# Patient Record
Sex: MALE | Race: WHITE | NOT HISPANIC OR LATINO | Employment: FULL TIME | URBAN - METROPOLITAN AREA
[De-identification: names, ages, dates, MRNs, and addresses within clinical notes are randomized per-mention and may not be internally consistent; named-entity substitution may affect disease eponyms.]

---

## 2017-01-09 ENCOUNTER — ALLSCRIPTS OFFICE VISIT (OUTPATIENT)
Dept: OTHER | Facility: OTHER | Age: 42
End: 2017-01-09

## 2017-01-19 ENCOUNTER — ALLSCRIPTS OFFICE VISIT (OUTPATIENT)
Dept: OTHER | Facility: OTHER | Age: 42
End: 2017-01-19

## 2017-01-19 ENCOUNTER — GENERIC CONVERSION - ENCOUNTER (OUTPATIENT)
Dept: OTHER | Facility: OTHER | Age: 42
End: 2017-01-19

## 2017-01-19 LAB
FLUAV AG SPEC QL IA: POSITIVE
INFLUENZA B AG (HISTORICAL): NEGATIVE

## 2017-03-27 DIAGNOSIS — E11.65 TYPE 2 DIABETES MELLITUS WITH HYPERGLYCEMIA (HCC): ICD-10-CM

## 2017-03-27 DIAGNOSIS — E78.1 PURE HYPERGLYCERIDEMIA: ICD-10-CM

## 2017-03-27 DIAGNOSIS — I10 ESSENTIAL (PRIMARY) HYPERTENSION: ICD-10-CM

## 2017-04-27 ENCOUNTER — APPOINTMENT (EMERGENCY)
Dept: ULTRASOUND IMAGING | Facility: HOSPITAL | Age: 42
End: 2017-04-27
Payer: COMMERCIAL

## 2017-04-27 ENCOUNTER — HOSPITAL ENCOUNTER (EMERGENCY)
Facility: HOSPITAL | Age: 42
Discharge: HOME | End: 2017-04-28
Attending: EMERGENCY MEDICINE | Admitting: EMERGENCY MEDICINE
Payer: COMMERCIAL

## 2017-04-27 ENCOUNTER — APPOINTMENT (EMERGENCY)
Dept: CT IMAGING | Facility: HOSPITAL | Age: 42
End: 2017-04-27
Payer: COMMERCIAL

## 2017-04-27 VITALS — OXYGEN SATURATION: 99 % | TEMPERATURE: 97.9 F | HEART RATE: 75 BPM | RESPIRATION RATE: 18 BRPM

## 2017-04-27 DIAGNOSIS — N20.1 RIGHT URETERAL STONE: Primary | ICD-10-CM

## 2017-04-27 LAB
ANION GAP SERPL CALCULATED.3IONS-SCNC: 13 MMOL/L (ref 4–13)
BASOPHILS # BLD AUTO: 0.04 THOUSANDS/ΜL (ref 0–0.1)
BASOPHILS NFR BLD AUTO: 1 % (ref 0–1)
BUN SERPL-MCNC: 21 MG/DL (ref 5–25)
CALCIUM SERPL-MCNC: 9.1 MG/DL (ref 8.3–10.1)
CHLORIDE SERPL-SCNC: 96 MMOL/L (ref 100–108)
CLARITY, POC: CLEAR
CO2 SERPL-SCNC: 26 MMOL/L (ref 21–32)
COLOR, POC: NORMAL
COLOR, POC: YELLOW
CREAT SERPL-MCNC: 1.1 MG/DL (ref 0.6–1.3)
EOSINOPHIL # BLD AUTO: 0.1 THOUSAND/ΜL (ref 0–0.61)
EOSINOPHIL NFR BLD AUTO: 1 % (ref 0–6)
ERYTHROCYTE [DISTWIDTH] IN BLOOD BY AUTOMATED COUNT: 14.4 % (ref 11.6–15.1)
EXT BILIRUBIN, UA: NORMAL
EXT BLOOD URINE: NORMAL
EXT GLUCOSE, UA: NORMAL
EXT KETONES: NORMAL
EXT NITRITE, UA: NORMAL
EXT PH, UA: 5
EXT PROTEIN, UA: NORMAL
EXT SPECIFIC GRAVITY, UA: 1.01
EXT UROBILINOGEN: 0.2
GFR SERPL CREATININE-BSD FRML MDRD: >60 ML/MIN/1.73SQ M
GLUCOSE SERPL-MCNC: 276 MG/DL (ref 65–140)
HCT VFR BLD AUTO: 40.6 % (ref 36.5–49.3)
HGB BLD-MCNC: 13.8 G/DL (ref 12–17)
LYMPHOCYTES # BLD AUTO: 1.58 THOUSANDS/ΜL (ref 0.6–4.47)
LYMPHOCYTES NFR BLD AUTO: 22 % (ref 14–44)
MCH RBC QN AUTO: 26.1 PG (ref 26.8–34.3)
MCHC RBC AUTO-ENTMCNC: 34 G/DL (ref 31.4–37.4)
MCV RBC AUTO: 77 FL (ref 82–98)
MONOCYTES # BLD AUTO: 0.55 THOUSAND/ΜL (ref 0.17–1.22)
MONOCYTES NFR BLD AUTO: 8 % (ref 4–12)
NEUTROPHILS # BLD AUTO: 4.79 THOUSANDS/ΜL (ref 1.85–7.62)
NEUTS SEG NFR BLD AUTO: 68 % (ref 43–75)
NRBC BLD AUTO-RTO: 0 /100 WBCS
PLATELET # BLD AUTO: 181 THOUSANDS/UL (ref 149–390)
PMV BLD AUTO: 10.3 FL (ref 8.9–12.7)
POTASSIUM SERPL-SCNC: 4 MMOL/L (ref 3.5–5.3)
RBC # BLD AUTO: 5.29 MILLION/UL (ref 3.88–5.62)
SODIUM SERPL-SCNC: 135 MMOL/L (ref 136–145)
WBC # BLD AUTO: 7.08 THOUSAND/UL (ref 4.31–10.16)
WBC # BLD EST: NORMAL 10*3/UL

## 2017-04-27 PROCEDURE — 36415 COLL VENOUS BLD VENIPUNCTURE: CPT | Performed by: PHYSICIAN ASSISTANT

## 2017-04-27 PROCEDURE — 85025 COMPLETE CBC W/AUTO DIFF WBC: CPT | Performed by: PHYSICIAN ASSISTANT

## 2017-04-27 PROCEDURE — 80048 BASIC METABOLIC PNL TOTAL CA: CPT | Performed by: PHYSICIAN ASSISTANT

## 2017-04-27 PROCEDURE — 81002 URINALYSIS NONAUTO W/O SCOPE: CPT | Performed by: PHYSICIAN ASSISTANT

## 2017-04-27 PROCEDURE — 74176 CT ABD & PELVIS W/O CONTRAST: CPT

## 2017-04-27 PROCEDURE — 76870 US EXAM SCROTUM: CPT

## 2017-04-27 PROCEDURE — 96374 THER/PROPH/DIAG INJ IV PUSH: CPT

## 2017-04-27 PROCEDURE — 96375 TX/PRO/DX INJ NEW DRUG ADDON: CPT

## 2017-04-27 RX ORDER — ONDANSETRON 2 MG/ML
4 INJECTION INTRAMUSCULAR; INTRAVENOUS ONCE
Status: DISCONTINUED | OUTPATIENT
Start: 2017-04-27 | End: 2017-04-28 | Stop reason: HOSPADM

## 2017-04-27 RX ORDER — KETOROLAC TROMETHAMINE 10 MG/1
10 TABLET, FILM COATED ORAL EVERY 6 HOURS PRN
Qty: 16 TABLET | Refills: 0 | Status: ON HOLD | OUTPATIENT
Start: 2017-04-27 | End: 2017-06-19 | Stop reason: ALTCHOICE

## 2017-04-27 RX ORDER — MORPHINE SULFATE 4 MG/ML
4 INJECTION, SOLUTION INTRAMUSCULAR; INTRAVENOUS ONCE
Status: DISCONTINUED | OUTPATIENT
Start: 2017-04-27 | End: 2017-04-28 | Stop reason: HOSPADM

## 2017-04-27 RX ORDER — ICOSAPENT ETHYL 1000 MG/1
1 CAPSULE ORAL 2 TIMES DAILY
COMMUNITY
End: 2017-06-14 | Stop reason: ALTCHOICE

## 2017-04-27 RX ORDER — ONDANSETRON 4 MG/1
4 TABLET, ORALLY DISINTEGRATING ORAL EVERY 6 HOURS PRN
Qty: 15 TABLET | Refills: 0 | Status: ON HOLD | OUTPATIENT
Start: 2017-04-27 | End: 2017-06-19 | Stop reason: ALTCHOICE

## 2017-04-27 RX ORDER — ATORVASTATIN CALCIUM 40 MG/1
40 TABLET, FILM COATED ORAL DAILY
COMMUNITY
End: 2018-06-20 | Stop reason: SDUPTHER

## 2017-04-27 RX ORDER — VALSARTAN AND HYDROCHLOROTHIAZIDE 320; 25 MG/1; MG/1
1 TABLET, FILM COATED ORAL DAILY
COMMUNITY
End: 2018-05-29

## 2017-04-27 RX ORDER — METFORMIN HYDROCHLORIDE 1000 MG/1
1000 TABLET, FILM COATED, EXTENDED RELEASE ORAL 2 TIMES DAILY WITH MEALS
COMMUNITY
End: 2018-05-29

## 2017-04-27 RX ORDER — ALLOPURINOL 300 MG/1
300 TABLET ORAL DAILY
COMMUNITY
End: 2018-10-02 | Stop reason: SDUPTHER

## 2017-04-27 RX ORDER — OXYCODONE HYDROCHLORIDE 5 MG/1
5 TABLET ORAL EVERY 4 HOURS PRN
Qty: 25 TABLET | Refills: 0 | Status: SHIPPED | OUTPATIENT
Start: 2017-04-27 | End: 2017-05-07

## 2017-04-27 RX ORDER — MORPHINE SULFATE 4 MG/ML
4 INJECTION, SOLUTION INTRAMUSCULAR; INTRAVENOUS ONCE
Status: COMPLETED | OUTPATIENT
Start: 2017-04-27 | End: 2017-04-27

## 2017-04-27 RX ORDER — KETOROLAC TROMETHAMINE 30 MG/ML
30 INJECTION, SOLUTION INTRAMUSCULAR; INTRAVENOUS ONCE
Status: COMPLETED | OUTPATIENT
Start: 2017-04-27 | End: 2017-04-27

## 2017-04-27 RX ORDER — ONDANSETRON 2 MG/ML
4 INJECTION INTRAMUSCULAR; INTRAVENOUS ONCE
Status: COMPLETED | OUTPATIENT
Start: 2017-04-27 | End: 2017-04-27

## 2017-04-27 RX ORDER — TAMSULOSIN HYDROCHLORIDE 0.4 MG/1
0.4 CAPSULE ORAL
Qty: 10 CAPSULE | Refills: 0 | Status: ON HOLD | OUTPATIENT
Start: 2017-04-27 | End: 2017-06-19 | Stop reason: ALTCHOICE

## 2017-04-27 RX ORDER — ONDANSETRON HYDROCHLORIDE 4 MG/5ML
4 SOLUTION ORAL ONCE
Status: DISCONTINUED | OUTPATIENT
Start: 2017-04-27 | End: 2017-04-27

## 2017-04-27 RX ADMIN — ONDANSETRON 4 MG: 2 INJECTION INTRAMUSCULAR; INTRAVENOUS at 21:25

## 2017-04-27 RX ADMIN — KETOROLAC TROMETHAMINE 30 MG: 30 INJECTION, SOLUTION INTRAMUSCULAR at 21:25

## 2017-04-27 RX ADMIN — MORPHINE SULFATE 4 MG: 4 INJECTION, SOLUTION INTRAMUSCULAR; INTRAVENOUS at 21:25

## 2017-04-28 PROCEDURE — 99284 EMERGENCY DEPT VISIT MOD MDM: CPT

## 2017-04-30 ENCOUNTER — ANESTHESIA EVENT (OUTPATIENT)
Dept: PERIOP | Facility: HOSPITAL | Age: 42
End: 2017-04-30
Payer: COMMERCIAL

## 2017-05-01 ENCOUNTER — HOSPITAL ENCOUNTER (OUTPATIENT)
Facility: HOSPITAL | Age: 42
Setting detail: OUTPATIENT SURGERY
Discharge: HOME | End: 2017-05-01
Attending: UROLOGY | Admitting: UROLOGY
Payer: COMMERCIAL

## 2017-05-01 ENCOUNTER — ANESTHESIA (OUTPATIENT)
Dept: PERIOP | Facility: HOSPITAL | Age: 42
End: 2017-05-01
Payer: COMMERCIAL

## 2017-05-01 ENCOUNTER — APPOINTMENT (OUTPATIENT)
Dept: RADIOLOGY | Facility: HOSPITAL | Age: 42
End: 2017-05-01
Payer: COMMERCIAL

## 2017-05-01 VITALS
HEART RATE: 80 BPM | OXYGEN SATURATION: 95 % | TEMPERATURE: 99.6 F | RESPIRATION RATE: 18 BRPM | HEIGHT: 74 IN | WEIGHT: 315 LBS | BODY MASS INDEX: 40.43 KG/M2 | SYSTOLIC BLOOD PRESSURE: 142 MMHG | DIASTOLIC BLOOD PRESSURE: 72 MMHG

## 2017-05-01 DIAGNOSIS — N20.9 CALCULUS (=STONE): ICD-10-CM

## 2017-05-01 LAB
ANION GAP SERPL CALCULATED.3IONS-SCNC: 7 MMOL/L (ref 4–13)
BUN SERPL-MCNC: 24 MG/DL (ref 5–25)
CALCIUM SERPL-MCNC: 9.2 MG/DL (ref 8.3–10.1)
CHLORIDE SERPL-SCNC: 100 MMOL/L (ref 100–108)
CO2 SERPL-SCNC: 27 MMOL/L (ref 21–32)
CREAT SERPL-MCNC: 1.58 MG/DL (ref 0.6–1.3)
GFR SERPL CREATININE-BSD FRML MDRD: 47.9 ML/MIN/1.73SQ M
GLUCOSE P FAST SERPL-MCNC: 225 MG/DL (ref 65–99)
GLUCOSE SERPL-MCNC: 225 MG/DL (ref 65–140)
GLUCOSE SERPL-MCNC: 234 MG/DL (ref 65–140)
GLUCOSE SERPL-MCNC: 249 MG/DL (ref 65–140)
POTASSIUM SERPL-SCNC: 4.4 MMOL/L (ref 3.5–5.3)
SODIUM SERPL-SCNC: 134 MMOL/L (ref 136–145)

## 2017-05-01 PROCEDURE — A9270 NON-COVERED ITEM OR SERVICE: HCPCS | Performed by: UROLOGY

## 2017-05-01 PROCEDURE — 80048 BASIC METABOLIC PNL TOTAL CA: CPT | Performed by: UROLOGY

## 2017-05-01 PROCEDURE — C1769 GUIDE WIRE: HCPCS | Performed by: UROLOGY

## 2017-05-01 PROCEDURE — 87086 URINE CULTURE/COLONY COUNT: CPT | Performed by: UROLOGY

## 2017-05-01 PROCEDURE — 82948 REAGENT STRIP/BLOOD GLUCOSE: CPT

## 2017-05-01 PROCEDURE — 82360 CALCULUS ASSAY QUANT: CPT | Performed by: UROLOGY

## 2017-05-01 PROCEDURE — 74420 UROGRAPHY RTRGR +-KUB: CPT

## 2017-05-01 PROCEDURE — C2617 STENT, NON-COR, TEM W/O DEL: HCPCS | Performed by: UROLOGY

## 2017-05-01 DEVICE — STENT URETERAL 6 FR 26CM INLAY OPTIMA: Type: IMPLANTABLE DEVICE | Site: URETER | Status: FUNCTIONAL

## 2017-05-01 RX ORDER — OXYCODONE HYDROCHLORIDE AND ACETAMINOPHEN 5; 325 MG/1; MG/1
1 TABLET ORAL EVERY 4 HOURS PRN
Qty: 20 TABLET | Refills: 0 | Status: SHIPPED | OUTPATIENT
Start: 2017-05-01 | End: 2017-05-08

## 2017-05-01 RX ORDER — ONDANSETRON 2 MG/ML
4 INJECTION INTRAMUSCULAR; INTRAVENOUS
Status: DISCONTINUED | OUTPATIENT
Start: 2017-05-01 | End: 2017-05-01 | Stop reason: HOSPADM

## 2017-05-01 RX ORDER — OXYCODONE HYDROCHLORIDE AND ACETAMINOPHEN 5; 325 MG/1; MG/1
1 TABLET ORAL EVERY 4 HOURS PRN
Status: DISCONTINUED | OUTPATIENT
Start: 2017-05-01 | End: 2017-05-01 | Stop reason: HOSPADM

## 2017-05-01 RX ORDER — DIPHENHYDRAMINE HYDROCHLORIDE 50 MG/ML
12.5 INJECTION INTRAMUSCULAR; INTRAVENOUS
Status: DISCONTINUED | OUTPATIENT
Start: 2017-05-01 | End: 2017-05-01 | Stop reason: HOSPADM

## 2017-05-01 RX ORDER — SODIUM CHLORIDE, SODIUM LACTATE, POTASSIUM CHLORIDE, CALCIUM CHLORIDE 600; 310; 30; 20 MG/100ML; MG/100ML; MG/100ML; MG/100ML
125 INJECTION, SOLUTION INTRAVENOUS CONTINUOUS
Status: DISCONTINUED | OUTPATIENT
Start: 2017-05-01 | End: 2017-05-01 | Stop reason: HOSPADM

## 2017-05-01 RX ORDER — PHENYLEPHRINE HCL 10 MG
1 TABLET ORAL 2 TIMES DAILY
COMMUNITY
End: 2019-08-12 | Stop reason: ALTCHOICE

## 2017-05-01 RX ORDER — PROPOFOL 10 MG/ML
INJECTION, EMULSION INTRAVENOUS AS NEEDED
Status: DISCONTINUED | OUTPATIENT
Start: 2017-05-01 | End: 2017-05-01 | Stop reason: SURG

## 2017-05-01 RX ORDER — INDOMETHACIN 75 MG/1
75 CAPSULE, EXTENDED RELEASE ORAL AS NEEDED
COMMUNITY
End: 2018-05-29

## 2017-05-01 RX ORDER — SODIUM CHLORIDE, SODIUM LACTATE, POTASSIUM CHLORIDE, CALCIUM CHLORIDE 600; 310; 30; 20 MG/100ML; MG/100ML; MG/100ML; MG/100ML
200 INJECTION, SOLUTION INTRAVENOUS CONTINUOUS
Status: DISCONTINUED | OUTPATIENT
Start: 2017-05-01 | End: 2017-05-01 | Stop reason: HOSPADM

## 2017-05-01 RX ORDER — FENTANYL CITRATE/PF 50 MCG/ML
25 SYRINGE (ML) INJECTION
Status: DISCONTINUED | OUTPATIENT
Start: 2017-05-01 | End: 2017-05-01 | Stop reason: HOSPADM

## 2017-05-01 RX ORDER — ONDANSETRON 2 MG/ML
INJECTION INTRAMUSCULAR; INTRAVENOUS AS NEEDED
Status: DISCONTINUED | OUTPATIENT
Start: 2017-05-01 | End: 2017-05-01 | Stop reason: SURG

## 2017-05-01 RX ORDER — FENTANYL CITRATE 50 UG/ML
INJECTION, SOLUTION INTRAMUSCULAR; INTRAVENOUS AS NEEDED
Status: DISCONTINUED | OUTPATIENT
Start: 2017-05-01 | End: 2017-05-01 | Stop reason: SURG

## 2017-05-01 RX ORDER — LIDOCAINE HYDROCHLORIDE 10 MG/ML
INJECTION, SOLUTION INFILTRATION; PERINEURAL AS NEEDED
Status: DISCONTINUED | OUTPATIENT
Start: 2017-05-01 | End: 2017-05-01 | Stop reason: SURG

## 2017-05-01 RX ORDER — CEFUROXIME AXETIL 500 MG/1
500 TABLET ORAL EVERY 12 HOURS SCHEDULED
Qty: 6 TABLET | Refills: 0 | Status: SHIPPED | OUTPATIENT
Start: 2017-05-01 | End: 2017-05-04

## 2017-05-01 RX ADMIN — ONDANSETRON 4 MG: 2 INJECTION INTRAMUSCULAR; INTRAVENOUS at 15:25

## 2017-05-01 RX ADMIN — Medication 2000 MG: at 14:57

## 2017-05-01 RX ADMIN — PROPOFOL 100 MG: 10 INJECTION, EMULSION INTRAVENOUS at 15:36

## 2017-05-01 RX ADMIN — PROPOFOL 50 MG: 10 INJECTION, EMULSION INTRAVENOUS at 15:38

## 2017-05-01 RX ADMIN — SODIUM CHLORIDE, SODIUM LACTATE, POTASSIUM CHLORIDE, AND CALCIUM CHLORIDE 125 ML/HR: .6; .31; .03; .02 INJECTION, SOLUTION INTRAVENOUS at 14:11

## 2017-05-01 RX ADMIN — FENTANYL CITRATE 50 MCG: 50 INJECTION, SOLUTION INTRAMUSCULAR; INTRAVENOUS at 15:50

## 2017-05-01 RX ADMIN — CEFAZOLIN SODIUM 1000 MG: 1 SOLUTION INTRAVENOUS at 14:57

## 2017-05-01 RX ADMIN — OXYCODONE HYDROCHLORIDE AND ACETAMINOPHEN 1 TABLET: 5; 325 TABLET ORAL at 17:32

## 2017-05-01 RX ADMIN — FENTANYL CITRATE 50 MCG: 50 INJECTION, SOLUTION INTRAMUSCULAR; INTRAVENOUS at 14:58

## 2017-05-01 RX ADMIN — PROPOFOL 200 MG: 10 INJECTION, EMULSION INTRAVENOUS at 14:58

## 2017-05-01 RX ADMIN — LIDOCAINE HYDROCHLORIDE 50 MG: 10 INJECTION, SOLUTION INFILTRATION; PERINEURAL at 14:58

## 2017-05-01 RX ADMIN — FENTANYL CITRATE 50 MCG: 50 INJECTION, SOLUTION INTRAMUSCULAR; INTRAVENOUS at 15:20

## 2017-05-04 LAB — BACTERIA UR CULT: NORMAL

## 2017-05-09 LAB
CA PHOS MFR STONE: 5 %
COLOR STONE: NORMAL
COM MFR STONE: 95 %
COMMENT-STONE3: NORMAL
COMPOSITION: NORMAL
LABORATORY COMMENT REPORT: NORMAL
NIDUS STONE QL: NORMAL
PHOTO: NORMAL
SIZE STONE: NORMAL MM
STONE ANALYSIS-IMP: NORMAL
WT STONE: 41.1 MG

## 2017-06-06 ENCOUNTER — GENERIC CONVERSION - ENCOUNTER (OUTPATIENT)
Dept: OTHER | Facility: OTHER | Age: 42
End: 2017-06-06

## 2017-06-14 ENCOUNTER — ALLSCRIPTS OFFICE VISIT (OUTPATIENT)
Dept: OTHER | Facility: OTHER | Age: 42
End: 2017-06-14

## 2017-06-14 DIAGNOSIS — E11.65 TYPE 2 DIABETES MELLITUS WITH HYPERGLYCEMIA (HCC): ICD-10-CM

## 2017-06-19 ENCOUNTER — HOSPITAL ENCOUNTER (OUTPATIENT)
Facility: HOSPITAL | Age: 42
Setting detail: OUTPATIENT SURGERY
Discharge: HOME | End: 2017-06-19
Attending: UROLOGY | Admitting: UROLOGY
Payer: COMMERCIAL

## 2017-06-19 ENCOUNTER — ANESTHESIA (OUTPATIENT)
Dept: PERIOP | Facility: HOSPITAL | Age: 42
End: 2017-06-19
Payer: COMMERCIAL

## 2017-06-19 ENCOUNTER — ANESTHESIA EVENT (OUTPATIENT)
Dept: PERIOP | Facility: HOSPITAL | Age: 42
End: 2017-06-19
Payer: COMMERCIAL

## 2017-06-19 VITALS
HEIGHT: 74 IN | SYSTOLIC BLOOD PRESSURE: 133 MMHG | RESPIRATION RATE: 18 BRPM | BODY MASS INDEX: 40.43 KG/M2 | OXYGEN SATURATION: 97 % | WEIGHT: 315 LBS | TEMPERATURE: 98.1 F | DIASTOLIC BLOOD PRESSURE: 84 MMHG | HEART RATE: 70 BPM

## 2017-06-19 PROBLEM — N20.0 RENAL CALCULUS, LEFT: Status: ACTIVE | Noted: 2017-06-19

## 2017-06-19 LAB
GLUCOSE SERPL-MCNC: 180 MG/DL (ref 65–140)
GLUCOSE SERPL-MCNC: 199 MG/DL (ref 65–140)

## 2017-06-19 PROCEDURE — 82948 REAGENT STRIP/BLOOD GLUCOSE: CPT

## 2017-06-19 RX ORDER — PROPOFOL 10 MG/ML
INJECTION, EMULSION INTRAVENOUS AS NEEDED
Status: DISCONTINUED | OUTPATIENT
Start: 2017-06-19 | End: 2017-06-19 | Stop reason: SURG

## 2017-06-19 RX ORDER — ONDANSETRON 2 MG/ML
INJECTION INTRAMUSCULAR; INTRAVENOUS AS NEEDED
Status: DISCONTINUED | OUTPATIENT
Start: 2017-06-19 | End: 2017-06-19 | Stop reason: SURG

## 2017-06-19 RX ORDER — SODIUM CHLORIDE, SODIUM LACTATE, POTASSIUM CHLORIDE, CALCIUM CHLORIDE 600; 310; 30; 20 MG/100ML; MG/100ML; MG/100ML; MG/100ML
250 INJECTION, SOLUTION INTRAVENOUS CONTINUOUS
Status: DISCONTINUED | OUTPATIENT
Start: 2017-06-19 | End: 2017-06-30 | Stop reason: HOSPADM

## 2017-06-19 RX ORDER — OXYCODONE HYDROCHLORIDE AND ACETAMINOPHEN 5; 325 MG/1; MG/1
1 TABLET ORAL EVERY 4 HOURS PRN
Qty: 12 TABLET | Refills: 0 | Status: SHIPPED | OUTPATIENT
Start: 2017-06-19 | End: 2017-06-22

## 2017-06-19 RX ORDER — SODIUM CHLORIDE, SODIUM LACTATE, POTASSIUM CHLORIDE, CALCIUM CHLORIDE 600; 310; 30; 20 MG/100ML; MG/100ML; MG/100ML; MG/100ML
75 INJECTION, SOLUTION INTRAVENOUS CONTINUOUS
Status: CANCELLED | OUTPATIENT
Start: 2017-06-19

## 2017-06-19 RX ORDER — LIDOCAINE HYDROCHLORIDE 10 MG/ML
INJECTION, SOLUTION INFILTRATION; PERINEURAL AS NEEDED
Status: DISCONTINUED | OUTPATIENT
Start: 2017-06-19 | End: 2017-06-19 | Stop reason: SURG

## 2017-06-19 RX ORDER — OXYCODONE HYDROCHLORIDE AND ACETAMINOPHEN 5; 325 MG/1; MG/1
1 TABLET ORAL EVERY 4 HOURS PRN
Status: DISCONTINUED | OUTPATIENT
Start: 2017-06-19 | End: 2017-06-30 | Stop reason: HOSPADM

## 2017-06-19 RX ORDER — FENTANYL CITRATE 50 UG/ML
INJECTION, SOLUTION INTRAMUSCULAR; INTRAVENOUS AS NEEDED
Status: DISCONTINUED | OUTPATIENT
Start: 2017-06-19 | End: 2017-06-19 | Stop reason: SURG

## 2017-06-19 RX ORDER — SODIUM CHLORIDE, SODIUM LACTATE, POTASSIUM CHLORIDE, CALCIUM CHLORIDE 600; 310; 30; 20 MG/100ML; MG/100ML; MG/100ML; MG/100ML
20 INJECTION, SOLUTION INTRAVENOUS CONTINUOUS
Status: DISCONTINUED | OUTPATIENT
Start: 2017-06-19 | End: 2017-06-30 | Stop reason: HOSPADM

## 2017-06-19 RX ORDER — METOCLOPRAMIDE HYDROCHLORIDE 5 MG/ML
10 INJECTION INTRAMUSCULAR; INTRAVENOUS ONCE AS NEEDED
Status: DISCONTINUED | OUTPATIENT
Start: 2017-06-19 | End: 2017-06-19 | Stop reason: HOSPADM

## 2017-06-19 RX ORDER — SUCCINYLCHOLINE CHLORIDE 20 MG/ML
INJECTION INTRAMUSCULAR; INTRAVENOUS AS NEEDED
Status: DISCONTINUED | OUTPATIENT
Start: 2017-06-19 | End: 2017-06-19 | Stop reason: SURG

## 2017-06-19 RX ORDER — FENTANYL CITRATE/PF 50 MCG/ML
50 SYRINGE (ML) INJECTION
Status: DISCONTINUED | OUTPATIENT
Start: 2017-06-19 | End: 2017-06-19 | Stop reason: HOSPADM

## 2017-06-19 RX ORDER — MIDAZOLAM HYDROCHLORIDE 1 MG/ML
INJECTION INTRAMUSCULAR; INTRAVENOUS AS NEEDED
Status: DISCONTINUED | OUTPATIENT
Start: 2017-06-19 | End: 2017-06-19 | Stop reason: SURG

## 2017-06-19 RX ORDER — FUROSEMIDE 10 MG/ML
20 INJECTION INTRAMUSCULAR; INTRAVENOUS ONCE
Status: COMPLETED | OUTPATIENT
Start: 2017-06-19 | End: 2017-06-19

## 2017-06-19 RX ADMIN — PROPOFOL 200 MG: 10 INJECTION, EMULSION INTRAVENOUS at 13:04

## 2017-06-19 RX ADMIN — LIDOCAINE HYDROCHLORIDE 50 MG: 10 INJECTION, SOLUTION INFILTRATION; PERINEURAL at 13:04

## 2017-06-19 RX ADMIN — FENTANYL CITRATE 50 MCG: 50 INJECTION, SOLUTION INTRAMUSCULAR; INTRAVENOUS at 13:19

## 2017-06-19 RX ADMIN — CEFAZOLIN SODIUM 1000 MG: 1 SOLUTION INTRAVENOUS at 13:21

## 2017-06-19 RX ADMIN — FUROSEMIDE 20 MG: 10 INJECTION, SOLUTION INTRAMUSCULAR; INTRAVENOUS at 14:12

## 2017-06-19 RX ADMIN — CEFAZOLIN SODIUM 2000 MG: 2 SOLUTION INTRAVENOUS at 13:21

## 2017-06-19 RX ADMIN — ONDANSETRON 4 MG: 2 INJECTION INTRAMUSCULAR; INTRAVENOUS at 13:34

## 2017-06-19 RX ADMIN — SUCCINYLCHOLINE CHLORIDE 140 MG: 20 INJECTION, SOLUTION INTRAMUSCULAR; INTRAVENOUS at 13:04

## 2017-06-19 RX ADMIN — SODIUM CHLORIDE, SODIUM LACTATE, POTASSIUM CHLORIDE, AND CALCIUM CHLORIDE 20 ML/HR: .6; .31; .03; .02 INJECTION, SOLUTION INTRAVENOUS at 12:21

## 2017-06-19 RX ADMIN — MIDAZOLAM HYDROCHLORIDE 2 MG: 1 INJECTION, SOLUTION INTRAMUSCULAR; INTRAVENOUS at 12:57

## 2017-06-19 RX ADMIN — FENTANYL CITRATE 50 MCG: 50 INJECTION, SOLUTION INTRAMUSCULAR; INTRAVENOUS at 13:04

## 2017-07-07 ENCOUNTER — ALLSCRIPTS OFFICE VISIT (OUTPATIENT)
Dept: OTHER | Facility: OTHER | Age: 42
End: 2017-07-07

## 2017-07-21 ENCOUNTER — ALLSCRIPTS OFFICE VISIT (OUTPATIENT)
Dept: OTHER | Facility: OTHER | Age: 42
End: 2017-07-21

## 2017-12-19 DIAGNOSIS — E11.65 TYPE 2 DIABETES MELLITUS WITH HYPERGLYCEMIA (HCC): ICD-10-CM

## 2017-12-19 DIAGNOSIS — M1A.0790 IDIOPATHIC CHRONIC GOUT, UNSPECIFIED ANKLE AND FOOT, WITHOUT TOPHUS (TOPHI): ICD-10-CM

## 2017-12-19 DIAGNOSIS — I10 ESSENTIAL (PRIMARY) HYPERTENSION: ICD-10-CM

## 2017-12-26 ENCOUNTER — GENERIC CONVERSION - ENCOUNTER (OUTPATIENT)
Dept: OTHER | Facility: OTHER | Age: 42
End: 2017-12-26

## 2017-12-27 LAB
A/G RATIO (HISTORICAL): 1.5 (ref 1.2–2.2)
ALBUMIN SERPL BCP-MCNC: 4.5 G/DL (ref 3.5–5.5)
ALP SERPL-CCNC: 84 IU/L (ref 39–117)
ALT SERPL W P-5'-P-CCNC: 38 IU/L (ref 0–44)
AST SERPL W P-5'-P-CCNC: 23 IU/L (ref 0–40)
BILIRUB SERPL-MCNC: 0.9 MG/DL (ref 0–1.2)
BUN SERPL-MCNC: 17 MG/DL (ref 6–24)
BUN/CREA RATIO (HISTORICAL): 20 (ref 9–20)
CALCIUM SERPL-MCNC: 9.6 MG/DL (ref 8.7–10.2)
CHLORIDE SERPL-SCNC: 90 MMOL/L (ref 96–106)
CHOLEST SERPL-MCNC: 295 MG/DL (ref 100–199)
CO2 SERPL-SCNC: 24 MMOL/L (ref 18–29)
CREAT SERPL-MCNC: 0.83 MG/DL (ref 0.76–1.27)
CREATININE, URINE (HISTORICAL): 132.8 MG/DL
EGFR AFRICAN AMERICAN (HISTORICAL): 124 ML/MIN/1.73
EGFR-AMERICAN CALC (HISTORICAL): 107 ML/MIN/1.73
GLUCOSE SERPL-MCNC: 316 MG/DL (ref 65–99)
HBA1C MFR BLD HPLC: 10.5 % (ref 4.8–5.6)
HDLC SERPL-MCNC: 19 MG/DL
LDLC SERPL CALC-MCNC: ABNORMAL MG/DL (ref 0–99)
MICROALBUM.,U,RANDOM (HISTORICAL): 97.4 UG/ML
MICROALBUMIN/CREATININE RATIO (HISTORICAL): 73.3 MG/G CREAT (ref 0–30)
POTASSIUM SERPL-SCNC: 4.5 MMOL/L (ref 3.5–5.2)
SODIUM SERPL-SCNC: 131 MMOL/L (ref 134–144)
TOT. GLOBULIN, SERUM (HISTORICAL): 3.1 G/DL (ref 1.5–4.5)
TOTAL PROTEIN (HISTORICAL): 7.6 G/DL (ref 6–8.5)
TRIGL SERPL-MCNC: 1926 MG/DL (ref 0–149)
URIC ACID (HISTORICAL): 6.3 MG/DL (ref 3.7–8.6)
VLDLC SERPL CALC-MCNC: ABNORMAL MG/DL (ref 5–40)

## 2018-01-04 ENCOUNTER — GENERIC CONVERSION - ENCOUNTER (OUTPATIENT)
Dept: OTHER | Facility: OTHER | Age: 43
End: 2018-01-04

## 2018-01-10 NOTE — MISCELLANEOUS
Provider Comments  Provider Comments:   PT NO SHOW [id number]      Signatures   Electronically signed by : Bright Aiken MD; Jan 9 2017  6:10PM EST                       (Author)

## 2018-01-11 NOTE — RESULT NOTES
Verified Results  (1) MICROALBUMIN CREATININE RATIO, RANDOM URINE [id number] 09:36AM [facility]     Test Name Result Flag Reference   Creatinine, Urine 111 3 mg/dL  Not Estab  Microalbumin, Urine 68 7 ug/mL  Not Estab     Microalb/Creat Ratio 61 7 mg/g creat H 0 0-30 0     (1) HEMOGLOBIN A1C [id number] 09:36AM [facility]     Test Name Result Flag Reference   Hemoglobin A1c 8 6 % H 4 8-5 6   Pre-diabetes: 5 7 - 6 4           Diabetes: >6 4           Glycemic control for adults with diabetes: <7 0     (1) CBC/PLT/DIFF [id number] 09:36AM [facility]     Test Name Result Flag Reference   WBC 4 6 x10E3/uL  3 4-10 8   RBC 5 38 x10E6/uL  4 14-5 80   Hemoglobin 14 1 g/dL  12 6-17 7   Hematocrit 42 7 %  37 5-51 0   MCV 79 fL  79-97   MCH 26 2 pg L 26 6-33 0   MCHC 33 0 g/dL  31 5-35 7   RDW 15 1 %  12 3-15 4   Platelets 721 I54O2/UI  150-379   Neutrophils 50 %     Lymphs 36 %     Monocytes 10 %     Eos 3 %     Basos 1 %     Neutrophils (Absolute) 2 3 x10E3/uL  1 4-7 0   Lymphs (Absolute) 1 7 x10E3/uL  0 7-3 1   Monocytes(Absolute) 0 5 x10E3/uL  0 1-0 9   Eos (Absolute) 0 2 x10E3/uL  0 0-0 4   Baso (Absolute) 0 0 x10E3/uL  0 0-0 2   Immature Granulocytes 0 %     Immature Grans (Abs) 0 0 x10E3/uL  0 0-0 1     (1) COMPREHENSIVE METABOLIC PANEL [id number] 72:91BA [facility]     Test Name Result Flag Reference   Glucose, Serum 195 mg/dL H 65-99   BUN 18 mg/dL  6-24   Creatinine, Serum 0 85 mg/dL  0 76-1 27   eGFR If NonAfricn Am 107 mL/min/1 73  >59   eGFR If Africn Am 123 mL/min/1 73  >59   BUN/Creatinine Ratio 21 H 9-20   Sodium, Serum 138 mmol/L  134-144   **Please note reference interval change**   Potassium, Serum 4 4 mmol/L  3 5-5 2   Chloride, Serum 95 mmol/L L    **Please note reference interval change**   Carbon Dioxide, Total 25 mmol/L  18-29   Calcium, Serum 9 8 mg/dL  8 7-10 2   Protein, Total, Serum 7 3 g/dL  6 0-8 5   Albumin, Serum 4 5 g/dL  3 5-5 5   Globulin, Total 2 8 g/dL  1 5-4 5   A/G Ratio 1 6  1 1-2 5   Bilirubin, Total 0 4 mg/dL  0 0-1 2   Alkaline Phosphatase, S 76 IU/L     AST (SGOT) 32 IU/L  0-40   ALT (SGPT) 52 IU/L H 0-44     (LC) Lipid Panel [id number] 09:36AM [facility]     Test Name Result Flag Reference   Cholesterol, Total 177 mg/dL  100-199   Triglycerides 625 mg/dL HH 0-149   HDL Cholesterol 23 mg/dL L >39   VLDL Cholesterol Lisandro Comment mg/dL  5-40   The calculation for the VLDL cholesterol is not valid when  triglyceride level is >400 mg/dL  LDL Cholesterol Calc Comment mg/dL  0-99   Triglyceride result indicated is too high for an accurate LDL  cholesterol estimation  (1) LDL,DIRECT [id number] 09:36AM [facility]     Test Name Result Flag Reference   LDL Chol  (Direct) 71 mg/dL  0-99     (LC) TSH Rfx on Abnormal to Free T4 [id number] 09:36AM [facility]     Test Name Result Flag Reference   TSH 0 704 uIU/mL  0 450-4 500     (1) URIC ACID [id number] 09:36AM [facility]     Test Name Result Flag Reference   Uric Acid, Serum 5 9 mg/dL  3 7-8 6   Therapeutic target for gout patients: <6 0     Stewart Douglas HWT89 Default [id number] 09:36AM [facility]     Test Name Result Flag Reference   Liza Shirley CMP14 Default Comment     A hand-written panel/profile was received from your office  In  accordance with the [facility] Ambiguous Test Code Policy dated July 6009, we have completed your order by using the closest currently  or formerly recognized AMA panel  We have assigned Comprehensive  Metabolic Panel (14), Test Code #035539 to this request   If this  is not the testing you wished to receive on this specimen, please  contact the [address] [place] [address] Client Inquiry/Technical Services Department  to clarify the test order  We appreciate your business  [facility]) Liza Shirley LP Default [id number] 09:36AM [facility]     Test Name Result Flag Reference   Andrea Champion LP Default Comment     A hand-written panel/profile was received from your office   In  accordance with the [facility] Ambiguous Test Code Policy dated July 5634, we have completed your order by using the closest currently  or formerly recognized AMA panel  We have assigned Lipid Panel,  Test Code #747506 to this request  If this is not the testing you  wished to receive on this specimen, please contact the [address] [place] [address]  Client Inquiry/Technical Services Department to clarify the test  order  We appreciate your business         Discussion/Summary   schedule for lab review

## 2018-01-13 VITALS
SYSTOLIC BLOOD PRESSURE: 140 MMHG | HEART RATE: 78 BPM | BODY MASS INDEX: 40.43 KG/M2 | DIASTOLIC BLOOD PRESSURE: 84 MMHG | OXYGEN SATURATION: 98 % | HEIGHT: 74 IN | WEIGHT: 315 LBS | TEMPERATURE: 98.6 F

## 2018-01-14 VITALS
TEMPERATURE: 96 F | HEART RATE: 72 BPM | OXYGEN SATURATION: 97 % | SYSTOLIC BLOOD PRESSURE: 126 MMHG | BODY MASS INDEX: 40.43 KG/M2 | HEIGHT: 74 IN | WEIGHT: 315 LBS | DIASTOLIC BLOOD PRESSURE: 82 MMHG

## 2018-01-14 VITALS
WEIGHT: 315 LBS | HEART RATE: 79 BPM | DIASTOLIC BLOOD PRESSURE: 80 MMHG | OXYGEN SATURATION: 95 % | BODY MASS INDEX: 40.43 KG/M2 | TEMPERATURE: 97.2 F | HEIGHT: 74 IN | SYSTOLIC BLOOD PRESSURE: 130 MMHG

## 2018-01-15 ENCOUNTER — ALLSCRIPTS OFFICE VISIT (OUTPATIENT)
Dept: OTHER | Facility: OTHER | Age: 43
End: 2018-01-15

## 2018-01-15 VITALS
SYSTOLIC BLOOD PRESSURE: 124 MMHG | HEART RATE: 76 BPM | OXYGEN SATURATION: 99 % | DIASTOLIC BLOOD PRESSURE: 84 MMHG | WEIGHT: 315 LBS | HEIGHT: 74 IN | BODY MASS INDEX: 40.43 KG/M2 | TEMPERATURE: 97.7 F

## 2018-01-16 NOTE — PROGRESS NOTES
Assessment   1  Uncontrolled diabetes mellitus (250 02) (E11 65)   2  Essential hypertriglyceridemia (272 1) (E78 1)   3  Hypertension (401 9) (I10)   4  Onychomycosis of toenail (110 1) (B35 1)   5  Chronic idiopathic gout of ankle, unspecified laterality (274 02) (M1A 0790)    Plan   Essential hypertriglyceridemia    · From  Atorvastatin Calcium 40 MG Oral Tablet Take 1 tablet daily To    Atorvastatin Calcium 40 MG Oral Tablet take 2 tablet daily   · Fenofibric Acid 135 MG Oral Capsule Delayed Release; take 1 capsule daily   · (1) COMPREHENSIVE METABOLIC PANEL; Status:Active; Requested for:01Apr2018;    · (1) LIPID PANEL FASTING W DIRECT LDL REFLEX; Status:Active; Requested    for:01Apr2018; Onychomycosis of toenail    · Terbinafine HCl - 250 MG Oral Tablet; TAKE 1 TABLET DAILY AS DIRECTED  PMH: History of neck pain    · Ibuprofen 600 MG Oral Tablet; TAKE 1 TABLET 3 TIMES DAILY WITH MEALS  PMH: Mechanical low back pain    · Cyclobenzaprine HCl - 10 MG Oral Tablet; TAKE 1 TABLET 3 times daily  Uncontrolled diabetes mellitus    · Trulicity 5 77 YE/1 1AN Subcutaneous Solution Pen-injector; INJECT 0 75 MG    ONCE A WEEK   · (1) HEMOGLOBIN A1C; Status:Active; Requested for:01Apr2018; Discussion/Summary      1  Uncontrolled DM - start Trulicity weekly  sample and first injection provided today  Pt instructed on common side effects including GI upset and pancreatitis  He is aware  He denies any personal or FH of MTC  HTN is controlled continue meds Lipids uncontrolled  Increase Atorvastatin to 80 mg per day  Start Fenofibrate  Stay on [address] [facility]  Recheck in 3 mo Gout is stable on allopurinol  Onychomycosis - start Terbinafine  discussed common side effects 2-3 weeks with glucose log  Possible side effects of new medications were reviewed with the patient/guardian today  The treatment plan was reviewed with the patient/guardian   The patient/guardian understands and agrees with the treatment plan      Chief Complaint Patient is here for a lab review  Patient is here today for follow up of chronic conditions described in HPI  History of Present Illness   38 yr old here to review labs  - A1c up to 10 5%  States he has not been checking his glucoses  He is on Metformin 1000 mg BID  Does not follow a healthy diet  - is controlled on Valsartan-HCTZ   -uncontrolled  On Atorvastatin 40 mg per day  On Vascepa  TG's very elevated  Admits to eating high fat meats  States family members have high TG's  Denies any h/o pancreatitis  thickened discolored toenails multiple toes  Gout - uric acid level is normal  On Allopurinol  Review of Systems        Constitutional: No fever or chills, feels well, no tiredness, no recent weight gain or weight loss  Eyes: No complaints of eye pain, no red eyes, no discharge from eyes, no itchy eyes  ENT: no complaints of earache, no hearing loss, no nosebleeds, no nasal discharge, no sore throat, no hoarseness  Cardiovascular: No complaints of slow heart rate, no fast heart rate, no chest pain, no palpitations, no leg claudication, no lower extremity  Respiratory: No complaints of shortness of breath, no wheezing, no cough, no SOB on exertion, no orthopnea or PND  Gastrointestinal: No complaints of abdominal pain, no constipation, no nausea or vomiting, no diarrhea or bloody stools  Genitourinary: No complaints of dysuria, no incontinence, no hesitancy, no nocturia, no genital lesion, no testicular pain  Musculoskeletal: No complaints of arthralgia, no myalgias, no joint swelling or stiffness, no limb pain or swelling  Integumentary: as noted in HPI  Neurological: No compliants of headache, no confusion, no convulsions, no numbness or tingling, no dizziness or fainting, no limb weakness, no difficulty walking        Psychiatric: Is not suicidal, no sleep disturbances, no anxiety or depression, no change in personality, no emotional problems  Endocrine: No complaints of proptosis, no hot flashes, no muscle weakness, no erectile dysfunction, no deepening of the voice, no feelings of weakness  Hematologic/Lymphatic: No complaints of swollen glands, no swollen glands in the neck, does not bleed easily, no easy bruising  Active Problems   1  Chronic idiopathic gout of ankle, unspecified laterality (274 02) (M1A 0790)   2  Essential hypertriglyceridemia (272 1) (E78 1)   3  Hypertension (401 9) (I10)   4  Hyponatremia (276 1) (E87 1)   5  Uncontrolled diabetes mellitus (250 02) (E11 65)    Past Medical History      The active problems and past medical history were reviewed and updated today  Surgical History      The surgical history was reviewed and updated today  Family History   Father    1  Family history of Bone Cancer  Maternal Grandfather    2  Family history of Prostate Cancer (V16 42)  Family History    3  Family history of Cancer   4  Family history of Father  At Age ___   9  Family history of Hypertension (V17 49)     The family history was reviewed and updated today  Social History    · Daily Coffee Consumption (___ Cups/Day)   · Daily Tea Consumption (___ Cups/Day)  The social history was reviewed and updated today  Current Meds    1  Allopurinol 300 MG Oral Tablet; Take 1 tablet daily as directed; Therapy: 94HKJ6344 to (Last Rx:2017)  Requested for: 2017 Ordered   2  Atorvastatin Calcium 40 MG Oral Tablet; Take 1 tablet daily; Therapy: 00EXO3404 to (Last Rx:2017)  Requested for: 2017 Ordered   3  Cyclobenzaprine HCl - 10 MG Oral Tablet; TAKE 1 TABLET 3 times daily; Therapy: 50HMQ9891 to (Evaluate:2018)  Requested for: 56QJN4642; Last     Rx:27Cds6429 Ordered   4  Ibuprofen 600 MG Oral Tablet; TAKE 1 TABLET 3 TIMES DAILY WITH MEALS; Therapy: 99KRK1754 to (Evaluate:38Dqb3608)  Requested for: 75DTJ2960; Last     Rx:07Hqf2026 Ordered   5   Indomethacin ER 75 MG Oral Capsule Extended Release; TAKE 1 CAPSULE Daily as     needed for pain; Therapy: 47WYM2599 to (Last Rx:31Jan2017)  Requested for: 03CIU4615 Ordered   6  MetFORMIN HCl ER (OSM) 1000 MG Oral Tablet Extended Release 24 Hour; take 1 tablet     twice a day; Therapy: 06ESY6529 to (Last Rx:25Dbw0852)  Requested for: 47Kco4819 Ordered   7  MetFORMIN HCl  MG Oral Tablet Extended Release 24 Hour; take 2 tablets by     mouth twice a day; Therapy: 56Afu9057 to (Evaluate:91Fxp3653)  Requested for: 33Ytc7444; Last     Rx:94Uje4901 Ordered   8  OneTouch Delica Lancets 60D Miscellaneous; TEST ONCE A DAY OR AS DIRECTED; Therapy: 31Avp7511 to (Evaluate:82Tse9169)  Requested for: 10Kfb9016; Last     Rx:75Yal6749 Ordered   9  OneTouch FinePoint Lancets Miscellaneous; TEST ONCE DAILY OR AS DIRECTED; Therapy: 01RFU5872 to (Last Saeid Dire)  Requested for: 18AAV4300 Ordered   10  OneTouch Ultra 2 w/Device Kit; pt to test once daily; Therapy: 80FBA0699 to (Last Rx:75Igq1569)  Requested for: 67Sdp8538 Ordered   11  OneTouch Ultra Blue In [employer]; USE 1 STRIP TWICE A DAY; Therapy: 13RHY0017 to (Last Rx:27Zeq4808)  Requested for: 53RPF0832 Ordered   12  Valsartan-Hydrochlorothiazide 320-25 MG Oral Tablet; Take 1 tablet daily; Therapy: 92XNK6569 to (Last Rx:88Ylr9900)  Requested for: 84Sbp6144 Ordered   13  Vascepa 1 GM Oral Capsule; TAKE 2 CAPSULES EVERY 12 HOURS; Therapy: 16HZO0449 to (Last IP:25BRZ6235)  Requested for: 83Qzq0705 Ordered     The medication list was reviewed and updated today  Allergies   1  No Known Drug Allergies    Vitals   Vital Signs    Recorded: 39NSA1221 08:06AM   Temperature 97 8 F   Heart Rate 88   Systolic 278   Diastolic 76   Height 6 ft 2 in   Weight 322 lb 9 6 oz   BMI Calculated 41 42   BSA Calculated 2 66   O2 Saturation 98     Physical Exam        Constitutional      General appearance: Abnormal   obese        Eyes      Conjunctiva and lids: No swelling, erythema, or discharge  Pulmonary      Respiratory effort: No increased work of breathing or signs of respiratory distress  Auscultation of lungs: Clear to auscultation, equal breath sounds bilaterally, no wheezes, no rales, no rhonci  Cardiovascular      Auscultation of heart: Normal rate and rhythm, normal S1 and S2, without murmurs  Examination of extremities for edema and/or varicosities: Normal        Musculoskeletal      Gait and station: Normal        Psychiatric      Orientation to person, place and time: Normal        Mood and affect: Normal        Diabetic Foot Screen: Abnormal        Socks and shoes removed, the Right Foot: the foot was dry, with a(n) callus and multiple toenails thickened and discolored  The toes on the right were normal-- and-- with full range of motion  The sensory exam showed  normal vibratory sensation at the level of the toes on the right  Normal tactile sensation with monofilament testing throughout the right foot  Socks and shoes removed, Left Foot: the foot was dry, with a(n) callus and toenails thickened and discolored  The toes on the left were normal-- and-- with full range of motion  The sensory exam showed  normal vibratory sensation at the level of the toes on the left , Normal tactile sensation with monofilament testing throughout the left foot  Pulses:      2+ in the dorsalis pedis on the right  Pulses:      2+ in the dorsalis pedis on the left  Assign Risk Category: 1: No loss of protective sensation, deformity present  Impending risk  Results/Data   (1) MICROALBUMIN CREATININE RATIO, RANDOM URINE 94Hfs1636 10:32AM [facility]      Test Name Result Flag Reference   Creatinine, Urine 132 8 mg/dL  Not Estab  Microalbumin, Urine 97 4 ug/mL  Not Estab     Microalb/Creat Ratio 73 3 mg/g creat H 0 0-30 0      (1) COMPREHENSIVE METABOLIC PANEL 78PLB2146 28:58VU [facility]      Test Name Result Flag Reference   Glucose, Serum 316 mg/dL H 65-99   BUN 17 mg/dL  6-24   Creatinine, Serum 0 83 mg/dL  0 76-1 27   BUN/Creatinine Ratio 20  9-20   Sodium, Serum 131 mmol/L L 134-144   Potassium, Serum 4 5 mmol/L  3 5-5 2   Chloride, Serum 90 mmol/L L    Carbon Dioxide, Total 24 mmol/L  18-29   Calcium, Serum 9 6 mg/dL  8 7-10 2   Protein, Total, Serum 7 6 g/dL  6 0-8 5   Albumin, Serum 4 5 g/dL  3 5-5 5   Globulin, Total 3 1 g/dL  1 5-4 5   A/G Ratio 1 5  1 2-2 2   Bilirubin, Total 0 9 mg/dL  0 0-1 2   Alkaline Phosphatase, S 84 IU/L     AST (SGOT) 23 IU/L  0-40   ALT (SGPT) 38 IU/L  0-44   eGFR If NonAfricn Am 107 mL/min/1 73  >59   eGFR If Africn Am 124 mL/min/1 73  >59      (LC) Lipid Panel [id number] 10:32AM [facility]      Test Name Result Flag Reference   Cholesterol, Total 295 mg/dL H 100-199   Triglycerides 1926 mg/dL HH 0-149   Results confirmed on     dilution  HDL Cholesterol 19 mg/dL L >39   VLDL Cholesterol Lisandro   5-40   The calculation for the VLDL cholesterol is not valid when     triglyceride level is >400 mg/dL  mg/dL   The calculation for the VLDL cholesterol is not valid when     triglyceride level is >400 mg/dL  LDL Cholesterol Calc   0-99   Triglyceride result indicated is too high for an accurate LDL     cholesterol estimation  mg/dL   Triglyceride result indicated is too high for an accurate LDL     cholesterol estimation        (1) HEMOGLOBIN A1C 44Rvy0839 10:32AM [facility]      Test Name Result Flag Reference   Hemoglobin A1c 10 5 % H 4 8-5 6   Pre-diabetes: 5 7 - 6 4              Diabetes: >6 4              Glycemic control for adults with diabetes: <7 0      (1) URIC ACID 12Knw9053 10:32AM [facility]      Test Name Result Flag Reference   Uric Acid, Serum 6 3 mg/dL  3 7-8 6   Therapeutic target for gout patients: <6 0      Signatures    Electronically signed by : Maria Esther James MD; Ray 15 2018  8:44AM EST                       (Author)

## 2018-01-22 VITALS
TEMPERATURE: 97.8 F | DIASTOLIC BLOOD PRESSURE: 76 MMHG | HEART RATE: 88 BPM | HEIGHT: 74 IN | OXYGEN SATURATION: 98 % | WEIGHT: 315 LBS | SYSTOLIC BLOOD PRESSURE: 130 MMHG | BODY MASS INDEX: 40.43 KG/M2

## 2018-01-23 NOTE — MISCELLANEOUS
Provider Comments  Provider Comments:   PT NO SHOW      Signatures   Electronically signed by : Farida Hilario NP; Jan 4 2018  6:26PM EST                       (Author)

## 2018-01-29 PROBLEM — M1A.0790: Status: ACTIVE | Noted: 2017-01-31

## 2018-01-29 PROBLEM — IMO0002 UNCONTROLLED DIABETES MELLITUS: Status: ACTIVE | Noted: 2018-01-04

## 2018-02-13 ENCOUNTER — OFFICE VISIT (OUTPATIENT)
Dept: FAMILY MEDICINE CLINIC | Facility: CLINIC | Age: 43
End: 2018-02-13
Payer: COMMERCIAL

## 2018-02-13 VITALS
HEART RATE: 90 BPM | WEIGHT: 315 LBS | SYSTOLIC BLOOD PRESSURE: 130 MMHG | TEMPERATURE: 97.4 F | DIASTOLIC BLOOD PRESSURE: 82 MMHG | HEIGHT: 74 IN | RESPIRATION RATE: 20 BRPM | OXYGEN SATURATION: 98 % | BODY MASS INDEX: 40.43 KG/M2

## 2018-02-13 DIAGNOSIS — M1A.0790 CHRONIC IDIOPATHIC GOUT OF ANKLE, UNSPECIFIED LATERALITY: ICD-10-CM

## 2018-02-13 DIAGNOSIS — E78.1 ESSENTIAL HYPERTRIGLYCERIDEMIA: ICD-10-CM

## 2018-02-13 DIAGNOSIS — I10 ESSENTIAL HYPERTENSION: ICD-10-CM

## 2018-02-13 DIAGNOSIS — IMO0001 UNCONTROLLED TYPE 2 DIABETES MELLITUS WITHOUT COMPLICATION, WITHOUT LONG-TERM CURRENT USE OF INSULIN: Primary | ICD-10-CM

## 2018-02-13 PROCEDURE — 99214 OFFICE O/P EST MOD 30 MIN: CPT | Performed by: NURSE PRACTITIONER

## 2018-02-13 RX ORDER — DULAGLUTIDE 0.75 MG/.5ML
0.75 INJECTION, SOLUTION SUBCUTANEOUS
COMMUNITY
Start: 2018-01-29 | End: 2018-02-13

## 2018-02-13 NOTE — PATIENT INSTRUCTIONS
DM- continue Trulicity  Increase dose  [facility]  Dietary changes  Low carb diet  Limit to 40-60 grams of carbs per meal  Exercise daily as able  HTN_ limit salt in diet  Hyperlipidemia- strive for 5 servings of fruits and veggies daily

## 2018-04-01 DIAGNOSIS — E11.65 TYPE 2 DIABETES MELLITUS WITH HYPERGLYCEMIA (HCC): ICD-10-CM

## 2018-04-01 DIAGNOSIS — E78.1 PURE HYPERGLYCERIDEMIA: ICD-10-CM

## 2018-04-05 DIAGNOSIS — M10.9 GOUT INVOLVING TOE, UNSPECIFIED CAUSE, UNSPECIFIED CHRONICITY, UNSPECIFIED LATERALITY: Primary | ICD-10-CM

## 2018-04-05 RX ORDER — ALLOPURINOL 300 MG/1
TABLET ORAL
Qty: 90 TABLET | Refills: 1 | Status: SHIPPED | OUTPATIENT
Start: 2018-04-05 | End: 2018-05-29

## 2018-04-29 DIAGNOSIS — I10 ESSENTIAL HYPERTENSION: ICD-10-CM

## 2018-04-29 DIAGNOSIS — E78.01 FAMILIAL HYPERCHOLESTEROLEMIA: Primary | ICD-10-CM

## 2018-04-29 RX ORDER — ATORVASTATIN CALCIUM 40 MG/1
TABLET, FILM COATED ORAL
Qty: 90 TABLET | Refills: 1 | Status: SHIPPED | OUTPATIENT
Start: 2018-04-29 | End: 2018-05-29

## 2018-04-29 RX ORDER — VALSARTAN AND HYDROCHLOROTHIAZIDE 320; 25 MG/1; MG/1
TABLET, FILM COATED ORAL
Qty: 90 TABLET | Refills: 1 | Status: SHIPPED | OUTPATIENT
Start: 2018-04-29 | End: 2018-10-30 | Stop reason: ALTCHOICE

## 2018-05-29 ENCOUNTER — OFFICE VISIT (OUTPATIENT)
Dept: FAMILY MEDICINE CLINIC | Facility: CLINIC | Age: 43
End: 2018-05-29
Payer: COMMERCIAL

## 2018-05-29 VITALS
HEIGHT: 74 IN | SYSTOLIC BLOOD PRESSURE: 126 MMHG | BODY MASS INDEX: 40.43 KG/M2 | WEIGHT: 315 LBS | OXYGEN SATURATION: 98 % | RESPIRATION RATE: 18 BRPM | DIASTOLIC BLOOD PRESSURE: 84 MMHG | HEART RATE: 86 BPM

## 2018-05-29 DIAGNOSIS — M54.10 ACUTE LOW BACK PAIN WITH RADICULAR SYMPTOMS, DURATION LESS THAN 6 WEEKS: Primary | ICD-10-CM

## 2018-05-29 DIAGNOSIS — N20.0 KIDNEY STONES: Primary | ICD-10-CM

## 2018-05-29 DIAGNOSIS — E11.9 TYPE 2 DIABETES MELLITUS WITHOUT COMPLICATION, WITHOUT LONG-TERM CURRENT USE OF INSULIN (HCC): ICD-10-CM

## 2018-05-29 DIAGNOSIS — I10 ESSENTIAL HYPERTENSION: ICD-10-CM

## 2018-05-29 DIAGNOSIS — B35.1 ONYCHOMYCOSIS: ICD-10-CM

## 2018-05-29 DIAGNOSIS — E78.2 MIXED HYPERLIPIDEMIA: ICD-10-CM

## 2018-05-29 PROCEDURE — 3074F SYST BP LT 130 MM HG: CPT | Performed by: NURSE PRACTITIONER

## 2018-05-29 PROCEDURE — 3079F DIAST BP 80-89 MM HG: CPT | Performed by: NURSE PRACTITIONER

## 2018-05-29 PROCEDURE — 99214 OFFICE O/P EST MOD 30 MIN: CPT | Performed by: NURSE PRACTITIONER

## 2018-05-29 RX ORDER — CYCLOBENZAPRINE HCL 10 MG
10 TABLET ORAL 3 TIMES DAILY
Qty: 30 TABLET | Refills: 0 | Status: SHIPPED | OUTPATIENT
Start: 2018-05-29 | End: 2019-05-23 | Stop reason: SDUPTHER

## 2018-05-29 RX ORDER — TERBINAFINE HYDROCHLORIDE 250 MG/1
250 TABLET ORAL DAILY
Qty: 80 TABLET | Refills: 0 | Status: SHIPPED | OUTPATIENT
Start: 2018-05-29 | End: 2018-08-17

## 2018-05-29 RX ORDER — METFORMIN HYDROCHLORIDE 500 MG/1
TABLET, EXTENDED RELEASE ORAL
COMMUNITY
Start: 2018-03-06 | End: 2018-12-03 | Stop reason: SDUPTHER

## 2018-05-29 RX ORDER — OXYCODONE HYDROCHLORIDE 5 MG/1
5 TABLET ORAL EVERY 6 HOURS PRN
Qty: 20 TABLET | Refills: 0 | Status: SHIPPED | OUTPATIENT
Start: 2018-05-29 | End: 2018-06-03

## 2018-05-29 RX ORDER — FENOFIBRIC ACID 135 MG/1
CAPSULE, DELAYED RELEASE ORAL
COMMUNITY
Start: 2018-04-17 | End: 2018-07-14 | Stop reason: SDUPTHER

## 2018-05-29 RX ORDER — KETOROLAC TROMETHAMINE 30 MG/ML
30 INJECTION, SOLUTION INTRAMUSCULAR; INTRAVENOUS ONCE
Status: COMPLETED | OUTPATIENT
Start: 2018-05-29 | End: 2018-05-29

## 2018-05-29 RX ADMIN — KETOROLAC TROMETHAMINE 30 MG: 30 INJECTION, SOLUTION INTRAMUSCULAR; INTRAVENOUS at 19:21

## 2018-05-29 NOTE — PROGRESS NOTES
Assessment/Plan:    No problem-specific Assessment & Plan notes found for this encounter  Diagnoses and all orders for this visit:    Acute low back pain with radicular symptoms, duration less than 6 weeks  -     ketorolac (TORADOL) injection 30 mg; Inject 1 mL (30 mg total) into the shoulder, thigh, or buttocks once   -     cyclobenzaprine (FLEXERIL) 10 mg tablet; Take 1 tablet (10 mg total) by mouth 3 (three) times a day for 5 days  -     oxyCODONE (ROXICODONE) 5 mg immediate release tablet; Take 1 tablet (5 mg total) by mouth every 6 (six) hours as needed for severe pain for up to 5 days Max Daily Amount: 20 mg    Essential hypertension  -     Comprehensive metabolic panel; Future  -     HEMOGLOBIN A1C W/ EAG ESTIMATION; Future  -     Lipid panel; Future    Mixed hyperlipidemia  -     Comprehensive metabolic panel; Future  -     HEMOGLOBIN A1C W/ EAG ESTIMATION; Future  -     Lipid panel; Future    Type 2 diabetes mellitus without complication, without long-term current use of insulin (HCC)  -     Comprehensive metabolic panel; Future  -     HEMOGLOBIN A1C W/ EAG ESTIMATION; Future  -     Lipid panel; Future    Onychomycosis  -     terbinafine (LamISIL) 250 mg tablet; Take 1 tablet (250 mg total) by mouth daily for 80 days    Other orders  -     metFORMIN (GLUCOPHAGE-XR) 500 mg 24 hr tablet;   -     Choline Fenofibrate (FENOFIBRIC ACID) 135 MG CPDR;           Subjective:      Patient ID: Avelino Dumont is a 37 y o  male  Pt is here for eval of back pain  He jumped out of an excavator this am  Sudden pain in low back mid to left side  He felt a "shock" sensation  This has happened before  He has radiation down left leg  He does have h/o sciatica  He did take  mg--TWO tabs at one time  This did relieve his pain    htn- stable  HLD- needs labs  DM-home glucose in 150's since starting Trulicity   Needs labs  Toenail fungus - "almost resolved" Needs refill of lamisil        The following portions of the patient's history were reviewed and updated as appropriate:   He  has a past medical history of CPAP (continuous positive airway pressure) dependence; Diabetes ([facility] ); HTN (hypertension); Kidney stone; Morbid obesity with BMI of 40 0-44 9, adult ([facility] ); and SANCHO on CPAP  He   Patient Active Problem List    Diagnosis Date Noted    Acute low back pain with radicular symptoms, duration less than 6 weeks 05/29/2018    Mixed hyperlipidemia 05/29/2018    Type 2 diabetes mellitus without complication, without long-term current use of insulin ([place] [address] ) 05/29/2018    Onychomycosis 05/29/2018    Uncontrolled diabetes mellitus ([place] [address] ) 01/04/2018    Renal calculus, left 06/19/2017    Chronic idiopathic gout of ankle, unspecified laterality 01/31/2017    Essential hypertriglyceridemia 07/28/2014    Hypertension 01/14/2014     He  has a past surgical history that includes pr cysto/uretero w/lithotripsy &indwell stent insrt (Right, 5/1/2017) and pr fragment kidney stone/ eswl (Left, 6/19/2017)  His family history includes Bone cancer in his father; Cancer in his family; Hypertension in his family; Prostate cancer in his maternal grandfather  He  reports that he has never smoked  He has never used smokeless tobacco  He reports that he does not drink alcohol or use drugs    Current Outpatient Prescriptions   Medication Sig Dispense Refill    allopurinol (ZYLOPRIM) 300 mg tablet Take 300 mg by mouth daily      atorvastatin (LIPITOR) 40 mg tablet Take 40 mg by mouth daily      Blood Glucose Monitoring Suppl (ONE TOUCH ULTRA 2) w/Device KIT 1 kit by Does not apply route daily      Choline Fenofibrate (FENOFIBRIC ACID) 135 MG CPDR       cyclobenzaprine (FLEXERIL) 10 mg tablet Take 1 tablet (10 mg total) by mouth 3 (three) times a day for 5 days 30 tablet 0    Dulaglutide 1 5 MG/0 5ML SOPN Inject 0 5 mL (1 5 mg total) under the skin once a week for 90 days 12 pen 3    glucose blood test strip by In Vitro route Twice daily      ibuprofen (MOTRIN) 600 mg tablet Take 1 tablet by mouth every 8 (eight) hours      Icosapent Ethyl (VASCEPA) 1 g CAPS Take 2 capsules by mouth every 12 (twelve) hours      indomethacin (INDOCIN SR) 75 mg CR capsule Take 75 mg by mouth daily as needed for pain relief      metFORMIN (GLUCOPHAGE-XR) 500 mg 24 hr tablet       Omega-3 Fatty Acids (FISH OIL DOUBLE STRENGTH) 1200 MG CAPS Take 1 capsule by mouth 2 (two) times a day      ONETOUCH DELICA LANCETS 87H MISC 1 Units by Does not apply route daily      oxyCODONE (ROXICODONE) 5 mg immediate release tablet Take 1 tablet (5 mg total) by mouth every 6 (six) hours as needed for severe pain for up to 5 days Max Daily Amount: 20 mg 20 tablet 0    terbinafine (LamISIL) 250 mg tablet Take 1 tablet (250 mg total) by mouth daily for 80 days 80 tablet 0    valsartan-hydrochlorothiazide (DIOVAN-HCT) 320-25 MG per tablet TAKE 1 TABLET DAILY 90 tablet 1     Current Facility-Administered Medications   Medication Dose Route Frequency Provider Last Rate Last Dose    ketorolac (TORADOL) injection 30 mg  30 mg Intramuscular Once ANTIONE Hylton         Current Outpatient Prescriptions on File Prior to Visit   Medication Sig    allopurinol (ZYLOPRIM) 300 mg tablet Take 300 mg by mouth daily    atorvastatin (LIPITOR) 40 mg tablet Take 40 mg by mouth daily    Blood Glucose Monitoring Suppl (ONE TOUCH ULTRA 2) w/Device KIT 1 kit by Does not apply route daily    Dulaglutide 1 5 MG/0 5ML SOPN Inject 0 5 mL (1 5 mg total) under the skin once a week for 90 days    glucose blood test strip by In Vitro route Twice daily    ibuprofen (MOTRIN) 600 mg tablet Take 1 tablet by mouth every 8 (eight) hours    Icosapent Ethyl (VASCEPA) 1 g CAPS Take 2 capsules by mouth every 12 (twelve) hours    indomethacin (INDOCIN SR) 75 mg CR capsule Take 75 mg by mouth daily as needed for pain relief    Omega-3 Fatty Acids (FISH OIL DOUBLE STRENGTH) 1200 MG CAPS Take 1 capsule by mouth 2 (two) times a day    ONETOUCH DELICA LANCETS 91S MISC 1 Units by Does not apply route daily    valsartan-hydrochlorothiazide (DIOVAN-HCT) 320-25 MG per tablet TAKE 1 TABLET DAILY    [DISCONTINUED] allopurinol (ZYLOPRIM) 300 mg tablet Take 1 tablet by mouth daily    [DISCONTINUED] allopurinol (ZYLOPRIM) 300 mg tablet TAKE 1 TABLET DAILY AS DIRECTED    [DISCONTINUED] atorvastatin (LIPITOR) 40 mg tablet Take 2 tablets by mouth daily    [DISCONTINUED] atorvastatin (LIPITOR) 40 mg tablet TAKE 1 TABLET DAILY    [DISCONTINUED] cyclobenzaprine (FLEXERIL) 10 mg tablet Take 1 tablet by mouth 3 (three) times a day    [DISCONTINUED] indomethacin (INDOCIN SR) 75 mg CR capsule Take 75 mg by mouth as needed    [DISCONTINUED] metFORMIN (FORTAMET) 1000 MG (OSM) 24 hr tablet Take 1 tablet by mouth 2 (two) times a day    [DISCONTINUED] metFORMIN (GLUMETZA) 1000 MG (MOD) 24 hr tablet Take 1,000 mg by mouth 2 (two) times a day with meals    [DISCONTINUED] valsartan-hydrochlorothiazide (DIOVAN-HCT) 320-25 MG per tablet Take 1 tablet by mouth daily    [DISCONTINUED] valsartan-hydrochlorothiazide (DIOVAN-HCT) 320-25 MG per tablet Take 1 tablet by mouth daily     No current facility-administered medications on file prior to visit  He has No Known Allergies       Review of Systems   Respiratory: Negative  Cardiovascular: Negative  Gastrointestinal: Negative  Endocrine: Negative  Genitourinary: Negative  Musculoskeletal: Positive for back pain  Skin: Negative  Allergic/Immunologic: Negative for immunocompromised state  Neurological: Negative for weakness and numbness  Hematological: Negative for adenopathy  All other systems reviewed and are negative          Objective:      /84 (BP Location: Left arm, Patient Position: Sitting)   Pulse 86   Resp 18   Ht 6' 2" (1 88 m)   Wt (!) 147 kg (324 lb)   SpO2 98%   BMI 41 60 kg/m²          Physical Exam   Constitutional: He is oriented to person, place, and time  He appears well-developed and well-nourished  HENT:   Head: Normocephalic and atraumatic  Eyes: Conjunctivae and EOM are normal  Pupils are equal, round, and reactive to light  Neck: Normal range of motion  Neck supple  Cardiovascular: Normal rate  Pulmonary/Chest: Effort normal    Abdominal: Soft  Musculoskeletal: He exhibits tenderness  He exhibits no edema  Tenderness to palpation left side l2-4 region  + paraspinal spasm on left side  Neurological: He is alert and oriented to person, place, and time  Skin: Skin is warm and dry  No rash noted  Psychiatric: He has a normal mood and affect  His behavior is normal  Judgment normal    Nursing note and vitals reviewed

## 2018-05-29 NOTE — PATIENT INSTRUCTIONS
Acute low back pain with sciatica- start muscle relaxant and pain medication  Apply moist heat to back    Htn- stable  HLD and DM- check labs and follow up in office for review  Onychomycosis- refill lamisil

## 2018-05-30 ENCOUNTER — APPOINTMENT (OUTPATIENT)
Dept: LAB | Facility: CLINIC | Age: 43
End: 2018-05-30
Payer: COMMERCIAL

## 2018-05-30 DIAGNOSIS — E78.2 MIXED HYPERLIPIDEMIA: ICD-10-CM

## 2018-05-30 DIAGNOSIS — E11.9 TYPE 2 DIABETES MELLITUS WITHOUT COMPLICATION, WITHOUT LONG-TERM CURRENT USE OF INSULIN (HCC): ICD-10-CM

## 2018-05-30 DIAGNOSIS — I10 ESSENTIAL HYPERTENSION: ICD-10-CM

## 2018-05-30 LAB
ALBUMIN SERPL BCP-MCNC: 4 G/DL (ref 3.5–5)
ALP SERPL-CCNC: 84 U/L (ref 46–116)
ALT SERPL W P-5'-P-CCNC: 63 U/L (ref 12–78)
ANION GAP SERPL CALCULATED.3IONS-SCNC: 6 MMOL/L (ref 4–13)
AST SERPL W P-5'-P-CCNC: 27 U/L (ref 5–45)
BILIRUB SERPL-MCNC: 0.58 MG/DL (ref 0.2–1)
BUN SERPL-MCNC: 19 MG/DL (ref 5–25)
CALCIUM SERPL-MCNC: 9.5 MG/DL (ref 8.3–10.1)
CHLORIDE SERPL-SCNC: 104 MMOL/L (ref 100–108)
CHOLEST SERPL-MCNC: 116 MG/DL (ref 50–200)
CO2 SERPL-SCNC: 28 MMOL/L (ref 21–32)
CREAT SERPL-MCNC: 1.13 MG/DL (ref 0.6–1.3)
EST. AVERAGE GLUCOSE BLD GHB EST-MCNC: 157 MG/DL
GFR SERPL CREATININE-BSD FRML MDRD: 78 ML/MIN/1.73SQ M
GLUCOSE P FAST SERPL-MCNC: 186 MG/DL (ref 65–99)
HBA1C MFR BLD: 7.1 % (ref 4.2–6.3)
HDLC SERPL-MCNC: 24 MG/DL (ref 40–60)
LDLC SERPL CALC-MCNC: 31 MG/DL (ref 0–100)
NONHDLC SERPL-MCNC: 92 MG/DL
POTASSIUM SERPL-SCNC: 4.4 MMOL/L (ref 3.5–5.3)
PROT SERPL-MCNC: 7.2 G/DL (ref 6.4–8.2)
SODIUM SERPL-SCNC: 138 MMOL/L (ref 136–145)
TRIGL SERPL-MCNC: 304 MG/DL

## 2018-05-30 PROCEDURE — 36415 COLL VENOUS BLD VENIPUNCTURE: CPT

## 2018-05-30 PROCEDURE — 83036 HEMOGLOBIN GLYCOSYLATED A1C: CPT

## 2018-05-30 PROCEDURE — 80053 COMPREHEN METABOLIC PANEL: CPT

## 2018-05-30 PROCEDURE — 80061 LIPID PANEL: CPT

## 2018-05-31 ENCOUNTER — TRANSCRIBE ORDERS (OUTPATIENT)
Dept: ADMINISTRATIVE | Facility: HOSPITAL | Age: 43
End: 2018-05-31

## 2018-05-31 DIAGNOSIS — E11.9 TYPE 2 DIABETES MELLITUS WITHOUT COMPLICATION, UNSPECIFIED WHETHER LONG TERM INSULIN USE (HCC): Primary | ICD-10-CM

## 2018-05-31 DIAGNOSIS — E78.5 HYPERLIPIDEMIA, UNSPECIFIED HYPERLIPIDEMIA TYPE: ICD-10-CM

## 2018-06-20 DIAGNOSIS — E78.2 MIXED HYPERLIPIDEMIA: Primary | ICD-10-CM

## 2018-06-20 RX ORDER — ATORVASTATIN CALCIUM 40 MG/1
80 TABLET, FILM COATED ORAL DAILY
Qty: 30 TABLET | Refills: 0 | Status: SHIPPED | OUTPATIENT
Start: 2018-06-20 | End: 2018-07-03 | Stop reason: SDUPTHER

## 2018-06-20 NOTE — TELEPHONE ENCOUNTER
PATIENT IS LEAVING FOR VACATION ON Friday AND HE RAN OUT OF HIS LIPITOR BECAUSE HE WAS TAKING 2 A DAY

## 2018-07-03 DIAGNOSIS — E78.2 MIXED HYPERLIPIDEMIA: ICD-10-CM

## 2018-07-03 RX ORDER — ATORVASTATIN CALCIUM 80 MG/1
80 TABLET, FILM COATED ORAL DAILY
Qty: 90 TABLET | Refills: 3 | Status: SHIPPED | OUTPATIENT
Start: 2018-07-03 | End: 2018-12-18 | Stop reason: SDUPTHER

## 2018-07-05 ENCOUNTER — OFFICE VISIT (OUTPATIENT)
Dept: FAMILY MEDICINE CLINIC | Facility: CLINIC | Age: 43
End: 2018-07-05
Payer: COMMERCIAL

## 2018-07-05 VITALS
BODY MASS INDEX: 40.43 KG/M2 | TEMPERATURE: 97.9 F | HEART RATE: 88 BPM | HEIGHT: 74 IN | SYSTOLIC BLOOD PRESSURE: 142 MMHG | DIASTOLIC BLOOD PRESSURE: 74 MMHG | OXYGEN SATURATION: 96 % | WEIGHT: 315 LBS

## 2018-07-05 DIAGNOSIS — IMO0001 RADICULAR PAIN OF RIGHT LOWER BACK: Primary | ICD-10-CM

## 2018-07-05 PROCEDURE — 3008F BODY MASS INDEX DOCD: CPT | Performed by: FAMILY MEDICINE

## 2018-07-05 PROCEDURE — 99213 OFFICE O/P EST LOW 20 MIN: CPT | Performed by: FAMILY MEDICINE

## 2018-07-05 RX ORDER — OXYCODONE HYDROCHLORIDE 10 MG/1
10 TABLET ORAL EVERY 4 HOURS PRN
Qty: 30 TABLET | Refills: 0 | Status: SHIPPED | OUTPATIENT
Start: 2018-07-05 | End: 2018-12-18 | Stop reason: SDUPTHER

## 2018-07-05 NOTE — PROGRESS NOTES
Assessment/Plan:     Diagnoses and all orders for this visit:    Radicular pain of right lower back  -     XR spine lumbar minimum 4 views non injury; Future  -     Ambulatory referral to Physical Therapy; Future  -     oxyCODONE (ROXICODONE) 10 MG TABS; Take 1 tablet (10 mg total) by mouth every 4 (four) hours as needed for moderate pain Max Daily Amount: 60 mg        Check x-ray  Start physical therapy  Continue anti-inflammatories during the day and he is taking oxycodone and cyclobenzaprine before bed  If pain persists he may need an MRI and/or spine and pain management  Subjective:      Patient ID: Abhishek Velasco is a 37 y o  male  He is here for ongoing lower back pain  Was seen on 5/29/18 for lower back pain which started after he jumped off an excavator  He was treated with Toradol, cyclobenzaprine, and oxycodone  The pain has moved from the left side of his back to the right side of his back  It is radiating down the right leg  Flexing and extending make it worse  He has been taking Indomethacin BID-he normally takes this for gout  This has not helped at all  Rashadjefferson Moseleybrady He also took ibuprofen with no relief  He states that oxycodone was helping him sleep at night  He was taking 2 5 milligram tablets before bed  He does not take it during the day due to work  The following portions of the patient's history were reviewed and updated as appropriate: He  has a past medical history of CPAP (continuous positive airway pressure) dependence; Diabetes ([address] ); HTN (hypertension); Kidney stone; Morbid obesity with BMI of 40 0-44 9, adult ([address] ); and SANCHO on CPAP    He   Patient Active Problem List    Diagnosis Date Noted    Radicular pain of right lower back 07/05/2018    Acute low back pain with radicular symptoms, duration less than 6 weeks 05/29/2018    Mixed hyperlipidemia 05/29/2018    Type 2 diabetes mellitus without complication, without long-term current use of insulin ([address] ) 05/29/2018    Onychomycosis 05/29/2018    Uncontrolled diabetes mellitus ([facility] ) 01/04/2018    Renal calculus, left 06/19/2017    Chronic idiopathic gout of ankle, unspecified laterality 01/31/2017    Essential hypertriglyceridemia 07/28/2014    Hypertension 01/14/2014     He  has a past surgical history that includes pr cysto/uretero w/lithotripsy &indwell stent insrt (Right, 5/1/2017) and pr fragment kidney stone/ eswl (Left, 6/19/2017)  His family history includes Bone cancer in his father; Cancer in his family; Hypertension in his family; Prostate cancer in his maternal grandfather  He  reports that he has never smoked  He has never used smokeless tobacco  He reports that he does not drink alcohol or use drugs    Current Outpatient Prescriptions   Medication Sig Dispense Refill    allopurinol (ZYLOPRIM) 300 mg tablet Take 300 mg by mouth daily      atorvastatin (LIPITOR) 80 mg tablet Take 1 tablet (80 mg total) by mouth daily 90 tablet 3    Blood Glucose Monitoring Suppl (ONE TOUCH ULTRA 2) w/Device KIT 1 kit by Does not apply route daily      Choline Fenofibrate (FENOFIBRIC ACID) 135 MG CPDR       cyclobenzaprine (FLEXERIL) 10 mg tablet Take 1 tablet (10 mg total) by mouth 3 (three) times a day for 5 days 30 tablet 0    Dulaglutide 1 5 MG/0 5ML SOPN Inject 0 5 mL (1 5 mg total) under the skin once a week for 90 days 12 pen 3    glucose blood test strip by In Vitro route Twice daily      ibuprofen (MOTRIN) 600 mg tablet Take 1 tablet by mouth every 8 (eight) hours      Icosapent Ethyl (VASCEPA) 1 g CAPS Take 2 capsules by mouth every 12 (twelve) hours      indomethacin (INDOCIN SR) 75 mg CR capsule Take 75 mg by mouth daily as needed for pain relief      metFORMIN (GLUCOPHAGE-XR) 500 mg 24 hr tablet       Omega-3 Fatty Acids (FISH OIL DOUBLE STRENGTH) 1200 MG CAPS Take 1 capsule by mouth 2 (two) times a day      ONETOUCH DELICA LANCETS 31U MISC 1 Units by Does not apply route daily      oxyCODONE (ROXICODONE) 10 MG TABS Take 1 tablet (10 mg total) by mouth every 4 (four) hours as needed for moderate pain Max Daily Amount: 60 mg 30 tablet 0    terbinafine (LamISIL) 250 mg tablet Take 1 tablet (250 mg total) by mouth daily for 80 days 80 tablet 0    valsartan-hydrochlorothiazide (DIOVAN-HCT) 320-25 MG per tablet TAKE 1 TABLET DAILY 90 tablet 1     No current facility-administered medications for this visit  Current Outpatient Prescriptions on File Prior to Visit   Medication Sig    allopurinol (ZYLOPRIM) 300 mg tablet Take 300 mg by mouth daily    atorvastatin (LIPITOR) 80 mg tablet Take 1 tablet (80 mg total) by mouth daily    Blood Glucose Monitoring Suppl (ONE TOUCH ULTRA 2) w/Device KIT 1 kit by Does not apply route daily    Choline Fenofibrate (FENOFIBRIC ACID) 135 MG CPDR     cyclobenzaprine (FLEXERIL) 10 mg tablet Take 1 tablet (10 mg total) by mouth 3 (three) times a day for 5 days    Dulaglutide 1 5 MG/0 5ML SOPN Inject 0 5 mL (1 5 mg total) under the skin once a week for 90 days    glucose blood test strip by In Vitro route Twice daily    ibuprofen (MOTRIN) 600 mg tablet Take 1 tablet by mouth every 8 (eight) hours    Icosapent Ethyl (VASCEPA) 1 g CAPS Take 2 capsules by mouth every 12 (twelve) hours    indomethacin (INDOCIN SR) 75 mg CR capsule Take 75 mg by mouth daily as needed for pain relief    metFORMIN (GLUCOPHAGE-XR) 500 mg 24 hr tablet     Omega-3 Fatty Acids (FISH OIL DOUBLE STRENGTH) 1200 MG CAPS Take 1 capsule by mouth 2 (two) times a day    ONETOUCH DELICA LANCETS 30I MISC 1 Units by Does not apply route daily    terbinafine (LamISIL) 250 mg tablet Take 1 tablet (250 mg total) by mouth daily for 80 days    valsartan-hydrochlorothiazide (DIOVAN-HCT) 320-25 MG per tablet TAKE 1 TABLET DAILY     No current facility-administered medications on file prior to visit  He has No Known Allergies       Review of Systems   Musculoskeletal: Positive for back pain  Neurological: Positive for numbness  Objective:      /74   Pulse 88   Temp 97 9 °F (36 6 °C)   Ht 6' 2" (1 88 m)   Wt (!) 150 kg (330 lb 6 4 oz)   SpO2 96%   BMI 42 42 kg/m²          Physical Exam   Constitutional: He is oriented to person, place, and time  He appears well-developed and well-nourished  No distress  Cardiovascular: Normal rate and normal heart sounds  Exam reveals no gallop and no friction rub  No murmur heard  Pulmonary/Chest: Effort normal and breath sounds normal  No respiratory distress  He has no wheezes  He has no rales  Musculoskeletal: He exhibits no edema  Lumbar back: He exhibits decreased range of motion, tenderness, pain and spasm  He exhibits no edema, no deformity and no laceration  Pain is worse with extension of the lumbar spine  Squat is normal and toe raise is normal   Neurological: He is alert and oriented to person, place, and time  He has normal strength  Psychiatric: He has a normal mood and affect   His behavior is normal  Judgment and thought content normal

## 2018-07-10 ENCOUNTER — HOSPITAL ENCOUNTER (OUTPATIENT)
Dept: RADIOLOGY | Facility: HOSPITAL | Age: 43
Discharge: HOME | End: 2018-07-10

## 2018-07-10 DIAGNOSIS — N20.0 KIDNEY STONES: ICD-10-CM

## 2018-07-12 ENCOUNTER — TELEPHONE (OUTPATIENT)
Dept: FAMILY MEDICINE CLINIC | Facility: CLINIC | Age: 43
End: 2018-07-12

## 2018-07-12 ENCOUNTER — OFFICE VISIT (OUTPATIENT)
Dept: UROLOGY | Facility: CLINIC | Age: 43
End: 2018-07-12
Payer: COMMERCIAL

## 2018-07-12 VITALS
SYSTOLIC BLOOD PRESSURE: 144 MMHG | BODY MASS INDEX: 40.43 KG/M2 | DIASTOLIC BLOOD PRESSURE: 84 MMHG | WEIGHT: 315 LBS | HEART RATE: 70 BPM | HEIGHT: 74 IN

## 2018-07-12 DIAGNOSIS — N20.0 KIDNEY STONES: Primary | ICD-10-CM

## 2018-07-12 LAB
SL AMB  POCT GLUCOSE, UA: ABNORMAL
SL AMB LEUKOCYTE ESTERASE,UA: ABNORMAL
SL AMB POCT BILIRUBIN,UA: ABNORMAL
SL AMB POCT BLOOD,UA: ABNORMAL
SL AMB POCT CLARITY,UA: CLEAR
SL AMB POCT COLOR,UA: YELLOW
SL AMB POCT KETONES,UA: ABNORMAL
SL AMB POCT NITRITE,UA: ABNORMAL
SL AMB POCT PH,UA: 5
SL AMB POCT SPECIFIC GRAVITY,UA: 1.02
SL AMB POCT URINE PROTEIN: ABNORMAL
SL AMB POCT UROBILINOGEN: ABNORMAL

## 2018-07-12 PROCEDURE — 81002 URINALYSIS NONAUTO W/O SCOPE: CPT | Performed by: PHYSICIAN ASSISTANT

## 2018-07-12 PROCEDURE — 99213 OFFICE O/P EST LOW 20 MIN: CPT | Performed by: PHYSICIAN ASSISTANT

## 2018-07-12 NOTE — TELEPHONE ENCOUNTER
Spoke to James [facility]  She stated since this is a f/u xray, the original dr that read the first one will need to read this one  It will not be read by just any radiologist  She also wanted to let us know if that dr happens to be on vacation, we will have to wait until they return before it is reviewed  James was going to find out this info and call me back

## 2018-07-12 NOTE — PROGRESS NOTES
UROLOGY PROGRESS NOTE   Patient Identifiers: Brendan Gautam (MRN [id number])  Date of Service: 7/12/2018    Subjective:     53-year-old male with history of kidney stones  He had lithotripsy on the left side last year  His KUB is stable which showing small stones bilaterally  Urine is clear  No other complaints  He drinks about a gallon of water per day with  Lemon  He takes allopurinol 300 milligrams daily  Patient has  no complaints  Objective:     VITALS:    Vitals:    07/12/18 0859   BP: 144/84   Pulse: 70     AUA SYMPTOM SCORE      Most Recent Value   AUA SYMPTOM SCORE   How often have you had a sensation of not emptying your bladder completely after you finished urinating? 1   How often have you had to urinate again less than two hours after you finished urinating? 1   How often have you found you stopped and started again several times when you urinate? 2   How often have you found it difficult to postpone urination? 0   How often have you had a weak urinary stream?  2   How often have you had to push or strain to begin urination? 1   How many times did you most typically get up to urinate from the time you went to bed at night until the time you got up in the morning?   1   Quality of Life: If you were to spend the rest of your life with your urinary condition just the way it is now, how would you feel about that?  2   AUA SYMPTOM SCORE  8            LABS:  Lab Results   Component Value Date    HGB 13 8 04/27/2017    HCT 40 6 04/27/2017    WBC 7 08 04/27/2017     04/27/2017   ]    Lab Results   Component Value Date     05/30/2018    K 4 4 05/30/2018     05/30/2018    CO2 28 05/30/2018    BUN 19 05/30/2018    CREATININE 1 13 05/30/2018    CALCIUM 9 5 05/30/2018    GLUCOSE 316 (H) 12/26/2017   ]    INPATIENT MEDS:    Current Outpatient Prescriptions:     allopurinol (ZYLOPRIM) 300 mg tablet, Take 300 mg by mouth daily, Disp: , Rfl:     atorvastatin (LIPITOR) 80 mg tablet, Take 1 tablet (80 mg total) by mouth daily, Disp: 90 tablet, Rfl: 3    Blood Glucose Monitoring Suppl (ONE TOUCH ULTRA 2) w/Device KIT, 1 kit by Does not apply route daily, Disp: , Rfl:     Choline Fenofibrate (FENOFIBRIC ACID) 135 MG CPDR, , Disp: , Rfl:     cyclobenzaprine (FLEXERIL) 10 mg tablet, Take 1 tablet (10 mg total) by mouth 3 (three) times a day for 5 days, Disp: 30 tablet, Rfl: 0    Dulaglutide 1 5 MG/0 5ML SOPN, Inject 0 5 mL (1 5 mg total) under the skin once a week for 90 days, Disp: 12 pen, Rfl: 3    glucose blood test strip, by In Vitro route Twice daily, Disp: , Rfl:     ibuprofen (MOTRIN) 600 mg tablet, Take 1 tablet by mouth every 8 (eight) hours, Disp: , Rfl:     Icosapent Ethyl (VASCEPA) 1 g CAPS, Take 2 capsules by mouth every 12 (twelve) hours, Disp: , Rfl:     metFORMIN (GLUCOPHAGE-XR) 500 mg 24 hr tablet, , Disp: , Rfl:     Omega-3 Fatty Acids (FISH OIL DOUBLE STRENGTH) 1200 MG CAPS, Take 1 capsule by mouth 2 (two) times a day, Disp: , Rfl:     ONETOUCH DELICA LANCETS 94M MISC, 1 Units by Does not apply route daily, Disp: , Rfl:     oxyCODONE (ROXICODONE) 10 MG TABS, Take 1 tablet (10 mg total) by mouth every 4 (four) hours as needed for moderate pain Max Daily Amount: 60 mg, Disp: 30 tablet, Rfl: 0    terbinafine (LamISIL) 250 mg tablet, Take 1 tablet (250 mg total) by mouth daily for 80 days, Disp: 80 tablet, Rfl: 0    valsartan-hydrochlorothiazide (DIOVAN-HCT) 320-25 MG per tablet, TAKE 1 TABLET DAILY, Disp: 90 tablet, Rfl: 1    indomethacin (INDOCIN SR) 75 mg CR capsule, Take 75 mg by mouth daily as needed for pain relief, Disp: , Rfl:       Physical Exam:   /84 (Patient Position: Sitting, Cuff Size: Adult)   Pulse 70   Ht 6' 2" (1 88 m)   Wt (!) 150 kg (331 lb)   BMI 42 50 kg/m²   GEN: no acute distress    RESP: breathing comfortably with no accessory muscle use    ABD: soft, non-tender, non-distended   INCISION:    EXT: no significant peripheral edema RADIOLOGY:   none     Assessment:    1  Kidney stones     Plan:   -  He has been stable  Will follow up in 2 years with a KUB prior to visit    -    His grandfather had prostate cancer   -

## 2018-07-12 NOTE — TELEPHONE ENCOUNTER
The lumbar spine x-ray is not read yet    Can you call radiology and find out if they will be reading this soon

## 2018-07-13 NOTE — TELEPHONE ENCOUNTER
He has a large osteophyte - this is part of arthritis    If his pain continues I would check an MRI and refer to spine/pain

## 2018-07-14 DIAGNOSIS — E78.1 HIGH TRIGLYCERIDES: Primary | ICD-10-CM

## 2018-07-15 RX ORDER — FENOFIBRIC ACID 135 MG/1
CAPSULE, DELAYED RELEASE ORAL
Qty: 90 CAPSULE | Refills: 1 | Status: SHIPPED | OUTPATIENT
Start: 2018-07-15 | End: 2018-12-18 | Stop reason: SDUPTHER

## 2018-07-16 ENCOUNTER — TELEPHONE (OUTPATIENT)
Dept: FAMILY MEDICINE CLINIC | Facility: CLINIC | Age: 43
End: 2018-07-16

## 2018-07-17 DIAGNOSIS — M54.5 CHRONIC LOW BACK PAIN, UNSPECIFIED BACK PAIN LATERALITY, WITH SCIATICA PRESENCE UNSPECIFIED: Primary | ICD-10-CM

## 2018-07-17 DIAGNOSIS — G89.29 CHRONIC LOW BACK PAIN, UNSPECIFIED BACK PAIN LATERALITY, WITH SCIATICA PRESENCE UNSPECIFIED: Primary | ICD-10-CM

## 2018-10-02 DIAGNOSIS — M10.9 GOUT, UNSPECIFIED CAUSE, UNSPECIFIED CHRONICITY, UNSPECIFIED SITE: Primary | ICD-10-CM

## 2018-10-02 DIAGNOSIS — M10.9 GOUT INVOLVING TOE, UNSPECIFIED CAUSE, UNSPECIFIED CHRONICITY, UNSPECIFIED LATERALITY: ICD-10-CM

## 2018-10-02 RX ORDER — ALLOPURINOL 300 MG/1
TABLET ORAL
Qty: 90 TABLET | Refills: 1 | OUTPATIENT
Start: 2018-10-02

## 2018-10-02 RX ORDER — ALLOPURINOL 300 MG/1
300 TABLET ORAL DAILY
Qty: 90 TABLET | Refills: 0 | Status: SHIPPED | OUTPATIENT
Start: 2018-10-02 | End: 2018-10-02 | Stop reason: SDUPTHER

## 2018-10-02 RX ORDER — ALLOPURINOL 300 MG/1
300 TABLET ORAL DAILY
Qty: 90 TABLET | Refills: 1 | Status: SHIPPED | OUTPATIENT
Start: 2018-10-02 | End: 2018-10-04 | Stop reason: SDUPTHER

## 2018-10-04 DIAGNOSIS — M10.9 GOUT, UNSPECIFIED CAUSE, UNSPECIFIED CHRONICITY, UNSPECIFIED SITE: ICD-10-CM

## 2018-10-05 RX ORDER — ALLOPURINOL 300 MG/1
300 TABLET ORAL DAILY
Qty: 90 TABLET | Refills: 0 | Status: SHIPPED | OUTPATIENT
Start: 2018-10-05 | End: 2018-10-09 | Stop reason: SDUPTHER

## 2018-10-09 DIAGNOSIS — M10.9 GOUT, UNSPECIFIED CAUSE, UNSPECIFIED CHRONICITY, UNSPECIFIED SITE: ICD-10-CM

## 2018-10-09 RX ORDER — ALLOPURINOL 300 MG/1
300 TABLET ORAL DAILY
Qty: 90 TABLET | Refills: 0 | Status: SHIPPED | OUTPATIENT
Start: 2018-10-09 | End: 2018-12-18 | Stop reason: SDUPTHER

## 2018-10-26 DIAGNOSIS — I10 ESSENTIAL HYPERTENSION: ICD-10-CM

## 2018-10-26 RX ORDER — VALSARTAN AND HYDROCHLOROTHIAZIDE 320; 25 MG/1; MG/1
TABLET, FILM COATED ORAL
Qty: 90 TABLET | Refills: 1 | OUTPATIENT
Start: 2018-10-26

## 2018-10-30 ENCOUNTER — TELEPHONE (OUTPATIENT)
Dept: FAMILY MEDICINE CLINIC | Facility: CLINIC | Age: 43
End: 2018-10-30

## 2018-10-30 DIAGNOSIS — I10 ESSENTIAL HYPERTENSION: ICD-10-CM

## 2018-10-30 RX ORDER — LOSARTAN POTASSIUM AND HYDROCHLOROTHIAZIDE 25; 100 MG/1; MG/1
1 TABLET ORAL DAILY
Qty: 90 TABLET | Refills: 3 | Status: SHIPPED | OUTPATIENT
Start: 2018-10-30 | End: 2018-12-18 | Stop reason: SDUPTHER

## 2018-11-20 ENCOUNTER — TELEPHONE (OUTPATIENT)
Dept: FAMILY MEDICINE CLINIC | Facility: CLINIC | Age: 43
End: 2018-11-20

## 2018-11-20 NOTE — TELEPHONE ENCOUNTER
Per Amber Lux /bc/rebecca  pt is being released from their care /closing as   called to verify that pt is being followed for diabetes & cholesterol  pt is due for hga1c please notify pt of this  [phone] Any ques call Amber Lux [phone] case # [id number]

## 2018-12-03 DIAGNOSIS — E11.9 TYPE 2 DIABETES MELLITUS WITHOUT COMPLICATION, WITHOUT LONG-TERM CURRENT USE OF INSULIN (HCC): Primary | ICD-10-CM

## 2018-12-03 RX ORDER — METFORMIN HYDROCHLORIDE 500 MG/1
TABLET, EXTENDED RELEASE ORAL
Qty: 360 TABLET | Refills: 3 | Status: SHIPPED | OUTPATIENT
Start: 2018-12-03 | End: 2018-12-18 | Stop reason: SDUPTHER

## 2018-12-18 ENCOUNTER — OFFICE VISIT (OUTPATIENT)
Dept: FAMILY MEDICINE CLINIC | Facility: CLINIC | Age: 43
End: 2018-12-18
Payer: COMMERCIAL

## 2018-12-18 VITALS
HEIGHT: 74 IN | WEIGHT: 315 LBS | RESPIRATION RATE: 18 BRPM | BODY MASS INDEX: 40.43 KG/M2 | OXYGEN SATURATION: 97 % | HEART RATE: 82 BPM | DIASTOLIC BLOOD PRESSURE: 88 MMHG | SYSTOLIC BLOOD PRESSURE: 136 MMHG

## 2018-12-18 DIAGNOSIS — Z28.21 PNEUMOCOCCAL VACCINATION DECLINED: ICD-10-CM

## 2018-12-18 DIAGNOSIS — IMO0001 RADICULAR PAIN OF RIGHT LOWER BACK: ICD-10-CM

## 2018-12-18 DIAGNOSIS — Z28.21 INFLUENZA VACCINATION DECLINED: ICD-10-CM

## 2018-12-18 DIAGNOSIS — I10 ESSENTIAL HYPERTENSION: ICD-10-CM

## 2018-12-18 DIAGNOSIS — E11.9 TYPE 2 DIABETES MELLITUS WITHOUT COMPLICATION, WITHOUT LONG-TERM CURRENT USE OF INSULIN (HCC): ICD-10-CM

## 2018-12-18 DIAGNOSIS — Z28.21 TETANUS, DIPHTHERIA, AND ACELLULAR PERTUSSIS (TDAP) VACCINATION DECLINED: ICD-10-CM

## 2018-12-18 DIAGNOSIS — M10.9 GOUT, UNSPECIFIED CAUSE, UNSPECIFIED CHRONICITY, UNSPECIFIED SITE: ICD-10-CM

## 2018-12-18 DIAGNOSIS — E78.2 MIXED HYPERLIPIDEMIA: ICD-10-CM

## 2018-12-18 DIAGNOSIS — G56.22 CUBITAL TUNNEL SYNDROME ON LEFT: Primary | ICD-10-CM

## 2018-12-18 DIAGNOSIS — E78.1 HIGH TRIGLYCERIDES: ICD-10-CM

## 2018-12-18 PROCEDURE — 3075F SYST BP GE 130 - 139MM HG: CPT | Performed by: NURSE PRACTITIONER

## 2018-12-18 PROCEDURE — 3008F BODY MASS INDEX DOCD: CPT | Performed by: NURSE PRACTITIONER

## 2018-12-18 PROCEDURE — 3079F DIAST BP 80-89 MM HG: CPT | Performed by: NURSE PRACTITIONER

## 2018-12-18 PROCEDURE — 99214 OFFICE O/P EST MOD 30 MIN: CPT | Performed by: NURSE PRACTITIONER

## 2018-12-18 PROCEDURE — 1036F TOBACCO NON-USER: CPT | Performed by: NURSE PRACTITIONER

## 2018-12-18 RX ORDER — DULAGLUTIDE 1.5 MG/.5ML
INJECTION, SOLUTION SUBCUTANEOUS
COMMUNITY
Start: 2018-10-10 | End: 2019-05-23 | Stop reason: SDUPTHER

## 2018-12-18 RX ORDER — LOSARTAN POTASSIUM AND HYDROCHLOROTHIAZIDE 25; 100 MG/1; MG/1
1 TABLET ORAL DAILY
Qty: 90 TABLET | Refills: 0 | Status: SHIPPED | OUTPATIENT
Start: 2018-12-18 | End: 2019-04-06 | Stop reason: SDUPTHER

## 2018-12-18 RX ORDER — ALLOPURINOL 300 MG/1
300 TABLET ORAL DAILY
Qty: 90 TABLET | Refills: 0 | Status: SHIPPED | OUTPATIENT
Start: 2018-12-18 | End: 2019-05-23 | Stop reason: SDUPTHER

## 2018-12-18 RX ORDER — OXYCODONE HYDROCHLORIDE 10 MG/1
10 TABLET ORAL EVERY 4 HOURS PRN
Qty: 60 TABLET | Refills: 0 | Status: SHIPPED | OUTPATIENT
Start: 2018-12-18 | End: 2019-05-23 | Stop reason: SDUPTHER

## 2018-12-18 RX ORDER — CYCLOBENZAPRINE HCL 10 MG
10 TABLET ORAL 3 TIMES DAILY
Qty: 60 TABLET | Refills: 0 | Status: SHIPPED | OUTPATIENT
Start: 2018-12-18 | End: 2019-02-19 | Stop reason: HOSPADM

## 2018-12-18 RX ORDER — FENOFIBRIC ACID 135 MG/1
1 CAPSULE, DELAYED RELEASE ORAL DAILY
Qty: 90 CAPSULE | Refills: 0 | Status: SHIPPED | OUTPATIENT
Start: 2018-12-18 | End: 2019-03-23 | Stop reason: SDUPTHER

## 2018-12-18 RX ORDER — ATORVASTATIN CALCIUM 80 MG/1
80 TABLET, FILM COATED ORAL DAILY
Qty: 90 TABLET | Refills: 0 | Status: SHIPPED | OUTPATIENT
Start: 2018-12-18 | End: 2019-05-23 | Stop reason: SDUPTHER

## 2018-12-18 RX ORDER — CYCLOBENZAPRINE HCL 10 MG
TABLET ORAL
COMMUNITY
Start: 2018-10-24 | End: 2018-12-18 | Stop reason: SDUPTHER

## 2018-12-18 RX ORDER — METFORMIN HYDROCHLORIDE 500 MG/1
500 TABLET, EXTENDED RELEASE ORAL 2 TIMES DAILY WITH MEALS
Qty: 360 TABLET | Refills: 0 | Status: SHIPPED | OUTPATIENT
Start: 2018-12-18 | End: 2019-05-23 | Stop reason: SDUPTHER

## 2018-12-18 NOTE — PROGRESS NOTES
Assessment/Plan:    No problem-specific Assessment & Plan notes found for this encounter  Diagnoses and all orders for this visit:    Cubital tunnel syndrome on left  -     CBC and differential; Future  -     Comprehensive metabolic panel; Future  -     Hemoglobin A1C; Future  -     Lipid panel; Future  -     Ambulatory referral to Orthopedic Surgery; Future    Type 2 diabetes mellitus without complication, without long-term current use of insulin (HCC)  -     metFORMIN (GLUCOPHAGE-XR) 500 mg 24 hr tablet; Take 1 tablet (500 mg total) by mouth 2 (two) times a day with meals  -     CBC and differential; Future  -     Comprehensive metabolic panel; Future  -     Hemoglobin A1C; Future  -     Lipid panel; Future    Essential hypertension  -     losartan-hydrochlorothiazide (HYZAAR) 100-25 MG per tablet; Take 1 tablet by mouth daily  -     CBC and differential; Future  -     Comprehensive metabolic panel; Future  -     Hemoglobin A1C; Future  -     Lipid panel; Future    Gout, unspecified cause, unspecified chronicity, unspecified site  -     allopurinol (ZYLOPRIM) 300 mg tablet; Take 1 tablet (300 mg total) by mouth daily  -     CBC and differential; Future  -     Comprehensive metabolic panel; Future  -     Hemoglobin A1C; Future  -     Lipid panel; Future    Radicular pain of right lower back  -     cyclobenzaprine (FLEXERIL) 10 mg tablet; Take 1 tablet (10 mg total) by mouth 3 (three) times a day  -     oxyCODONE (ROXICODONE) 10 MG TABS; Take 1 tablet (10 mg total) by mouth every 4 (four) hours as needed for moderate pain Max Daily Amount: 60 mg  -     CBC and differential; Future  -     Comprehensive metabolic panel; Future  -     Hemoglobin A1C; Future  -     Lipid panel; Future    High triglycerides  -     Choline Fenofibrate (FENOFIBRIC ACID) 135 MG CPDR; Take 1 capsule (135 mg total) by mouth daily  -     CBC and differential; Future  -     Comprehensive metabolic panel;  Future  -     Hemoglobin A1C; Future  -     Lipid panel; Future    Mixed hyperlipidemia  -     atorvastatin (LIPITOR) 80 mg tablet; Take 1 tablet (80 mg total) by mouth daily  -     CBC and differential; Future  -     Comprehensive metabolic panel; Future  -     Hemoglobin A1C; Future  -     Lipid panel; Future    Influenza vaccination declined  -     CBC and differential; Future  -     Comprehensive metabolic panel; Future  -     Hemoglobin A1C; Future  -     Lipid panel; Future    Pneumococcal vaccination declined  -     CBC and differential; Future  -     Comprehensive metabolic panel; Future  -     Hemoglobin A1C; Future  -     Lipid panel; Future    Tetanus, diphtheria, and acellular pertussis (Tdap) vaccination declined  -     CBC and differential; Future  -     Comprehensive metabolic panel; Future  -     Hemoglobin A1C; Future  -     Lipid panel; Future    Other orders  -     FLUCELVAX 0 5 ML CATRACHO;   -     Discontinue: cyclobenzaprine (FLEXERIL) 10 mg tablet;   -     TRULICITY 1 5 PT/6 7ZO SOPN;           Subjective:      Patient ID: Mary Haney is a 37 y o  male  Pt developed numbness of 4/5 fingers of the left hand  He has restricted movement of 5th finger and half of the 4th finger  At  Nighttime, forearm feels tight  The following portions of the patient's history were reviewed and updated as appropriate:   He  has a past medical history of CPAP (continuous positive airway pressure) dependence; Diabetes ([facility] [address] ); HTN (hypertension); Kidney stone; Morbid obesity with BMI of 40 0-44 9, adult ([address] ); and SANCHO on CPAP    He   Patient Active Problem List    Diagnosis Date Noted    Gout 12/18/2018    Cubital tunnel syndrome on left 12/18/2018    Influenza vaccination declined 12/18/2018    Pneumococcal vaccination declined 12/18/2018    Tetanus, diphtheria, and acellular pertussis (Tdap) vaccination declined 12/18/2018    Radicular pain of right lower back 07/05/2018    Acute low back pain with radicular symptoms, duration less than 6 weeks 05/29/2018    Mixed hyperlipidemia 05/29/2018    Type 2 diabetes mellitus without complication, without long-term current use of insulin ([facility] [address] ) 05/29/2018    Onychomycosis 05/29/2018    Uncontrolled diabetes mellitus ([facility] [address] ) 01/04/2018    Renal calculus, left 06/19/2017    Chronic idiopathic gout of ankle, unspecified laterality 01/31/2017    High triglycerides 07/28/2014    Hypertension 01/14/2014     He  has a past surgical history that includes pr cysto/uretero w/lithotripsy &indwell stent insrt (Right, 5/1/2017) and pr fragment kidney stone/ eswl (Left, 6/19/2017)  His family history includes Bone cancer in his father; Cancer in his family; Hypertension in his family; Prostate cancer in his maternal grandfather  He  reports that he has never smoked  He has never used smokeless tobacco  He reports that he does not drink alcohol or use drugs    Current Outpatient Prescriptions   Medication Sig Dispense Refill    allopurinol (ZYLOPRIM) 300 mg tablet Take 1 tablet (300 mg total) by mouth daily 90 tablet 0    atorvastatin (LIPITOR) 80 mg tablet Take 1 tablet (80 mg total) by mouth daily 90 tablet 0    Blood Glucose Monitoring Suppl (ONE TOUCH ULTRA 2) w/Device KIT 1 kit by Does not apply route daily      Choline Fenofibrate (FENOFIBRIC ACID) 135 MG CPDR Take 1 capsule (135 mg total) by mouth daily 90 capsule 0    cyclobenzaprine (FLEXERIL) 10 mg tablet Take 1 tablet (10 mg total) by mouth 3 (three) times a day for 5 days 30 tablet 0    cyclobenzaprine (FLEXERIL) 10 mg tablet Take 1 tablet (10 mg total) by mouth 3 (three) times a day 60 tablet 0    Dulaglutide 1 5 MG/0 5ML SOPN Inject 0 5 mL (1 5 mg total) under the skin once a week for 90 days 12 pen 3    FLUCELVAX 0 5 ML CATRACHO       glucose blood test strip by In Vitro route Twice daily      ibuprofen (MOTRIN) 600 mg tablet Take 1 tablet by mouth every 8 (eight) hours      Icosapent Ethyl (VASCEPA) 1 g CAPS Take 2 capsules by mouth every 12 (twelve) hours      indomethacin (INDOCIN SR) 75 mg CR capsule Take 75 mg by mouth daily as needed for pain relief      losartan-hydrochlorothiazide (HYZAAR) 100-25 MG per tablet Take 1 tablet by mouth daily 90 tablet 0    metFORMIN (GLUCOPHAGE-XR) 500 mg 24 hr tablet Take 1 tablet (500 mg total) by mouth 2 (two) times a day with meals 360 tablet 0    Omega-3 Fatty Acids (FISH OIL DOUBLE STRENGTH) 1200 MG CAPS Take 1 capsule by mouth 2 (two) times a day      ONETOUCH DELICA LANCETS 70S MISC 1 Units by Does not apply route daily      oxyCODONE (ROXICODONE) 10 MG TABS Take 1 tablet (10 mg total) by mouth every 4 (four) hours as needed for moderate pain Max Daily Amount: 60 mg 60 tablet 0    TRULICITY 1 5 JY/6 0CK SOPN        No current facility-administered medications for this visit        Current Outpatient Prescriptions on File Prior to Visit   Medication Sig    Blood Glucose Monitoring Suppl (ONE TOUCH ULTRA 2) w/Device KIT 1 kit by Does not apply route daily    cyclobenzaprine (FLEXERIL) 10 mg tablet Take 1 tablet (10 mg total) by mouth 3 (three) times a day for 5 days    Dulaglutide 1 5 MG/0 5ML SOPN Inject 0 5 mL (1 5 mg total) under the skin once a week for 90 days    glucose blood test strip by In Vitro route Twice daily    ibuprofen (MOTRIN) 600 mg tablet Take 1 tablet by mouth every 8 (eight) hours    Icosapent Ethyl (VASCEPA) 1 g CAPS Take 2 capsules by mouth every 12 (twelve) hours    indomethacin (INDOCIN SR) 75 mg CR capsule Take 75 mg by mouth daily as needed for pain relief    Omega-3 Fatty Acids (FISH OIL DOUBLE STRENGTH) 1200 MG CAPS Take 1 capsule by mouth 2 (two) times a day    ONETOUCH DELICA LANCETS 98D MISC 1 Units by Does not apply route daily    [DISCONTINUED] allopurinol (ZYLOPRIM) 300 mg tablet Take 1 tablet (300 mg total) by mouth daily    [DISCONTINUED] atorvastatin (LIPITOR) 80 mg tablet Take 1 tablet (80 mg total) by mouth daily    [DISCONTINUED] Choline Fenofibrate (FENOFIBRIC ACID) 135 MG CPDR TAKE 1 CAPSULE DAILY    [DISCONTINUED] losartan-hydrochlorothiazide (HYZAAR) 100-25 MG per tablet Take 1 tablet by mouth daily    [DISCONTINUED] metFORMIN (GLUCOPHAGE-XR) 500 mg 24 hr tablet TAKE 2 TABLETS TWICE A DAY    [DISCONTINUED] oxyCODONE (ROXICODONE) 10 MG TABS Take 1 tablet (10 mg total) by mouth every 4 (four) hours as needed for moderate pain Max Daily Amount: 60 mg     No current facility-administered medications on file prior to visit  He has No Known Allergies       Review of Systems   Constitutional: Negative for fatigue and fever  HENT: Negative for congestion  Eyes: Negative for visual disturbance  Respiratory: Negative for cough and shortness of breath  Cardiovascular: Negative for chest pain, palpitations and leg swelling  Gastrointestinal: Negative for abdominal distention and abdominal pain  Endocrine: Negative for cold intolerance, polydipsia and polyuria  Genitourinary: Negative for difficulty urinating  Musculoskeletal: Negative for back pain and joint swelling  Restricted mvmnt of left hand 5th finger   Skin: Negative for color change and rash  Allergic/Immunologic: Negative for immunocompromised state  Neurological: Positive for numbness  Negative for dizziness and headaches  Left hand 5th finger   Hematological: Negative for adenopathy  Psychiatric/Behavioral: Negative for behavioral problems and sleep disturbance  All other systems reviewed and are negative  Objective:      /88 (BP Location: Left arm, Patient Position: Sitting)   Pulse 82   Resp 18   Ht 6' 2" (1 88 m)   Wt (!) 150 kg (331 lb)   SpO2 97%   BMI 42 50 kg/m²          Physical Exam   Constitutional: He is oriented to person, place, and time  He appears well-developed and well-nourished  No distress  HENT:   Head: Normocephalic and atraumatic     Mouth/Throat: Oropharynx is clear and moist    Eyes: Pupils are equal, round, and reactive to light  Conjunctivae are normal  No scleral icterus  Neck: Normal range of motion  Neck supple  Cardiovascular: Normal rate, regular rhythm and normal heart sounds  Exam reveals no gallop and no friction rub  No murmur heard  Pulmonary/Chest: Effort normal and breath sounds normal  No respiratory distress  Abdominal: Soft  There is no tenderness  Musculoskeletal: He exhibits no edema or tenderness  restricted lateral mvmnt to 5th finger left hand   Lymphadenopathy:     He has no cervical adenopathy  Neurological: He is alert and oriented to person, place, and time  Decreased sensation tip of 5th finger and lateral side of 4th finger, of left hand  Skin: Skin is warm and dry  He is not diaphoretic  Psychiatric: He has a normal mood and affect  His behavior is normal  Judgment and thought content normal    Nursing note and vitals reviewed

## 2018-12-18 NOTE — PATIENT INSTRUCTIONS
Cubital tunnel syndrome- refer to Dr Sobia Stuart  Will need corticosteroid injection for symptom mngmnt  Obtain labs to help manage chronic medical problems  Cubital Tunnel Syndrome   WHAT YOU NEED TO KNOW:   What is cubital tunnel syndrome? Cubital tunnel syndrome is a condition where there is increased pressure on the ulnar nerve in your elbow  The ulnar nerve controls muscles and feeling in the hand  Cubital tunnel syndrome may be caused by direct pressure, stretching, or decreased blood flow to the ulnar nerve  What increases my risk for cubital tunnel syndrome? · Previous injury to your elbow or ulnar nerve    · Leaning on or bending your elbow for long periods of time, such as when you sleep    · Repetitive motion of your elbow, such as painting, playing an instrument, or using power tools    · Health conditions such as hypothyroidism, diabetes, or arthritis     · Tumor or cyst    · Obesity    · Pregnancy  What are the signs and symptoms of cubital tunnel syndrome? · Numbness and tingling in your arm or hand, usually in the ring and little fingers    · Pain in your elbow that extends into your forearm and hand    · Weakness in your hand and fingers    · Not being able to straighten your fingers, usually the ring and little fingers  How is cubital tunnel syndrome diagnosed? Your healthcare provider will ask about your signs and symptoms and examine your hand and arm  Your healthcare provider may check the movement of your shoulder, elbow, wrist, and fingers  You may need any of the following tests:   · Nerve compression tests:  Your healthcare provider will tap or press on your elbow  He may also ask you to keep your elbow bent for a short time  These tests are used to check for numbness, tingling, and pain  · X-ray: This is used to look at the bones in your elbow to find the cause of your symptoms       · Electrodiagnostic studies:  Electrodiagnostic studies may include nerve conduction studies and electromyography  These studies test how well your nerves are working  Your healthcare provider uses these tests to learn more about your condition and to decide how to treat your symptoms  · MRI:  This scan uses powerful magnets and a computer to take pictures of your elbow  You may be given dye to help the pictures show up better  Tell healthcare providers if you are allergic to iodine or shellfish  You may also be allergic to the dye  Do not enter the MRI room with anything metal  Metal can cause serious injury  Tell healthcare providers if you have any metal in or on your body  · Ultrasound: An ultrasound uses sound waves to show pictures of your elbow and forearm tissues on a monitor  An ultrasound may show the cause of the pressure against your ulnar nerve  How is cubital tunnel syndrome treated? Cubital tunnel syndrome may go away by itself  In other cases, you may need the following treatment to decrease your symptoms:  · Medicines:      ¨ NSAIDs:  These medicines decrease swelling and pain  NSAIDs are available without a doctor's order  Ask your healthcare provider which medicine is right for you and how much to take  Take as directed  NSAIDs can cause stomach bleeding or kidney problems if not taken correctly  ¨ Steroid: This injection helps decrease pain and swelling  · Surgery: You may need surgery to take pressure off your ulnar nerve  Your healthcare provider may move your nerve to a different area to stop it from being stretched or pinched  Your healthcare provider may remove part of your bone if it is pressing on your nerve  What are the risks of cubital tunnel syndrome? · Surgery may cause an infection, pain, swelling, or bruising  Surgery may damage the nerves, muscles, ligaments, or blood vessels in your arm  This can cause weakness or numbness in your arm and hand   Even after treatment, you may still have symptoms    · Without treatment, your symptoms may not go away or get worse  Your hand and fingers may become very weak  You may have problems using your arm or hand  You may not be able to grab, squeeze, or lift items  It may be hard for you to do your daily activities  How can I manage my symptoms? · Avoid putting pressure on your elbow:  Certain positions put pressure on the ulnar nerve in your elbow  Leaning or sleeping on your bent elbow can make your symptoms worse  · Apply ice:  Ice helps decrease swelling and pain  Ice may also help prevent tissue damage  Use an ice pack or put crushed ice in a plastic bag  Cover the ice pack with a towel and place it on the area for 15 to 20 minutes every hour  · Rest your arm:  You may need to rest your injured arm and avoid activities that cause your symptoms to allow your nerve to heal     · Get physical therapy:  A physical therapist can show you exercises to help improve movement and strength  Physical therapy can also help decrease pain and loss of function  · Use elbow splint or brace: You may need a brace or splint on your elbow to decrease your arm movement  This will help to keep pressure off your ulnar nerve  You may also need elbow pads to protect your elbow  When should I contact my healthcare provider? · Your symptoms get worse  · Your hand and fingers are so weak that you cannot grab, squeeze, or lift items  · You have questions or concerns about your condition or care  When should I seek immediate care? · You suddenly lose feeling in your hand or fingers  · You cannot move your ring or little finger  CARE AGREEMENT:   You have the right to help plan your care  Learn about your health condition and how it may be treated  Discuss treatment options with your caregivers to decide what care you want to receive  You always have the right to refuse treatment  The above information is an  only  It is not intended as medical advice for individual conditions or treatments   Talk to your doctor, nurse or pharmacist before following any medical regimen to see if it is safe and effective for you  © 2017 [employer] Information is for End User's use only and may not be sold, redistributed or otherwise used for commercial purposes  All illustrations and images included in CareNotes® are the copyrighted property of [employer]  or [employer]

## 2018-12-21 ENCOUNTER — TRANSCRIBE ORDERS (OUTPATIENT)
Dept: ADMINISTRATIVE | Facility: HOSPITAL | Age: 43
End: 2018-12-21

## 2018-12-24 LAB
ALBUMIN SERPL-MCNC: 4.5 G/DL (ref 3.5–5.5)
ALBUMIN/GLOB SERPL: 2 {RATIO} (ref 1.2–2.2)
ALP SERPL-CCNC: 58 IU/L (ref 39–117)
ALT SERPL-CCNC: 43 IU/L (ref 0–44)
AST SERPL-CCNC: 25 IU/L (ref 0–40)
BASOPHILS # BLD AUTO: 0 X10E3/UL (ref 0–0.2)
BASOPHILS NFR BLD AUTO: 0 %
BILIRUB SERPL-MCNC: 0.5 MG/DL (ref 0–1.2)
BUN SERPL-MCNC: 16 MG/DL (ref 6–24)
BUN/CREAT SERPL: 16 (ref 9–20)
CALCIUM SERPL-MCNC: 9.4 MG/DL (ref 8.7–10.2)
CHLORIDE SERPL-SCNC: 99 MMOL/L (ref 96–106)
CHOLEST SERPL-MCNC: 125 MG/DL (ref 100–199)
CO2 SERPL-SCNC: 25 MMOL/L (ref 20–29)
CREAT SERPL-MCNC: 0.99 MG/DL (ref 0.76–1.27)
EOSINOPHIL # BLD AUTO: 0.1 X10E3/UL (ref 0–0.4)
EOSINOPHIL NFR BLD AUTO: 3 %
ERYTHROCYTE [DISTWIDTH] IN BLOOD BY AUTOMATED COUNT: 15.1 % (ref 12.3–15.4)
GLOBULIN SER-MCNC: 2.3 G/DL (ref 1.5–4.5)
GLUCOSE SERPL-MCNC: 123 MG/DL (ref 65–99)
HBA1C MFR BLD: 6.8 % (ref 4.8–5.6)
HCT VFR BLD AUTO: 40.2 % (ref 37.5–51)
HDLC SERPL-MCNC: 25 MG/DL
HGB BLD-MCNC: 13.5 G/DL (ref 13–17.7)
IMM GRANULOCYTES # BLD: 0 X10E3/UL (ref 0–0.1)
IMM GRANULOCYTES NFR BLD: 0 %
LABCORP COMMENT: NORMAL
LDLC SERPL CALC-MCNC: 38 MG/DL (ref 0–99)
LYMPHOCYTES # BLD AUTO: 1.8 X10E3/UL (ref 0.7–3.1)
LYMPHOCYTES NFR BLD AUTO: 37 %
MCH RBC QN AUTO: 26.2 PG (ref 26.6–33)
MCHC RBC AUTO-ENTMCNC: 33.6 G/DL (ref 31.5–35.7)
MCV RBC AUTO: 78 FL (ref 79–97)
MONOCYTES # BLD AUTO: 0.4 X10E3/UL (ref 0.1–0.9)
MONOCYTES NFR BLD AUTO: 9 %
NEUTROPHILS # BLD AUTO: 2.4 X10E3/UL (ref 1.4–7)
NEUTROPHILS NFR BLD AUTO: 51 %
PLATELET # BLD AUTO: 189 X10E3/UL (ref 150–379)
POTASSIUM SERPL-SCNC: 4.3 MMOL/L (ref 3.5–5.2)
PROT SERPL-MCNC: 6.8 G/DL (ref 6–8.5)
RBC # BLD AUTO: 5.16 X10E6/UL (ref 4.14–5.8)
SL AMB EGFR AFRICAN AMERICAN: 106 ML/MIN/1.73
SL AMB EGFR NON AFRICAN AMERICAN: 92 ML/MIN/1.73
SL AMB VLDL CHOLESTEROL CALC: 62 MG/DL (ref 5–40)
SODIUM SERPL-SCNC: 140 MMOL/L (ref 134–144)
TRIGL SERPL-MCNC: 312 MG/DL (ref 0–149)
WBC # BLD AUTO: 4.8 X10E3/UL (ref 3.4–10.8)

## 2018-12-26 DIAGNOSIS — D69.6 THROMBOCYTOPENIA (HCC): Primary | ICD-10-CM

## 2018-12-27 ENCOUNTER — TELEPHONE (OUTPATIENT)
Dept: OBGYN CLINIC | Facility: MEDICAL CENTER | Age: 43
End: 2018-12-27

## 2018-12-27 NOTE — TELEPHONE ENCOUNTER
Pt is returning call from office stated that someone asked him if he could come in at 8 am for his appt tomorrow instead of his scheduled time  Pt spoke with job and okay 8 am  Please advise to make changes to schedule for 12/27   Pt can be reached at [phone]

## 2018-12-28 ENCOUNTER — OFFICE VISIT (OUTPATIENT)
Dept: OBGYN CLINIC | Facility: CLINIC | Age: 43
End: 2018-12-28
Payer: COMMERCIAL

## 2018-12-28 VITALS — BODY MASS INDEX: 40.43 KG/M2 | HEIGHT: 74 IN | WEIGHT: 315 LBS

## 2018-12-28 DIAGNOSIS — G56.22 GUYON SYNDROME, LEFT: Primary | ICD-10-CM

## 2018-12-28 DIAGNOSIS — G56.22 CUBITAL TUNNEL SYNDROME ON LEFT: ICD-10-CM

## 2018-12-28 PROCEDURE — 99214 OFFICE O/P EST MOD 30 MIN: CPT | Performed by: ORTHOPAEDIC SURGERY

## 2018-12-28 NOTE — PROGRESS NOTES
CHIEF COMPLAINT:  Chief Complaint   Patient presents with    Left Hand - Pain       SUBJECTIVE:  Maddie Ellis is a 37y o  year old  male who presents to the office with numbness and tingling of his left small and ring fingers  Patient states this has been ongoing for about 3 weeks  Patient states that he has stiffness in the morning  Patient states he has increased symptoms after his activity for the day is done  Pt states that he has difficulty opening and closing buttons  Patient has difficulty gripping objects  Patient states that many years ago he was in the hospital for left elbow pain  PAST MEDICAL HISTORY:  Past Medical History:   Diagnosis Date    CPAP (continuous positive airway pressure) dependence     Diabetes ([facility] [address] )     HTN (hypertension)     Kidney stone     RIGHT    Morbid obesity with BMI of 40 0-44 9, adult (HCC)     SANCHO on CPAP        PAST SURGICAL HISTORY:  Past Surgical History:   Procedure Laterality Date    [place] CYSTO/URETERO W/LITHOTRIPSY &INDWELL STENT INSRT Right 5/1/2017    Procedure: CYSTOSCOPY URETEROSCOPY WITH LITHOTRIPSY HOLMIUM LASER, RETROGRADE PYELOGRAM AND INSERTION STENT URETERAL;  Surgeon: Vicente Roach MD;  [facility];  Service: Urology    [facility] ESWL Left 6/19/2017    Procedure: Jluis Jaclyn SHOCKWAVE (ESWL);   Surgeon: Vicente Roach MD;  [facility];  Service: Urology       FAMILY HISTORY:  Family History   Problem Relation Age of Onset    Bone cancer Father     Prostate cancer Maternal Grandfather     Cancer Family     Hypertension Family        SOCIAL HISTORY:  Social History   Substance Use Topics    Smoking status: Never Smoker    Smokeless tobacco: Never Used      Comment: Former Smoker as per allscripts    Alcohol use No       MEDICATIONS:    Current Outpatient Prescriptions:     allopurinol (ZYLOPRIM) 300 mg tablet, Take 1 tablet (300 mg total) by mouth daily, Disp: 90 tablet, Rfl: 0   atorvastatin (LIPITOR) 80 mg tablet, Take 1 tablet (80 mg total) by mouth daily, Disp: 90 tablet, Rfl: 0    Blood Glucose Monitoring Suppl (ONE TOUCH ULTRA 2) w/Device KIT, 1 kit by Does not apply route daily, Disp: , Rfl:     Choline Fenofibrate (FENOFIBRIC ACID) 135 MG CPDR, Take 1 capsule (135 mg total) by mouth daily, Disp: 90 capsule, Rfl: 0    cyclobenzaprine (FLEXERIL) 10 mg tablet, Take 1 tablet (10 mg total) by mouth 3 (three) times a day, Disp: 60 tablet, Rfl: 0    FLUCELVAX 0 5 ML CATRACHO, , Disp: , Rfl:     glucose blood test strip, by In Vitro route Twice daily, Disp: , Rfl:     ibuprofen (MOTRIN) 600 mg tablet, Take 1 tablet by mouth every 8 (eight) hours, Disp: , Rfl:     Icosapent Ethyl (VASCEPA) 1 g CAPS, Take 2 capsules by mouth every 12 (twelve) hours, Disp: , Rfl:     indomethacin (INDOCIN SR) 75 mg CR capsule, Take 75 mg by mouth daily as needed for pain relief, Disp: , Rfl:     losartan-hydrochlorothiazide (HYZAAR) 100-25 MG per tablet, Take 1 tablet by mouth daily, Disp: 90 tablet, Rfl: 0    metFORMIN (GLUCOPHAGE-XR) 500 mg 24 hr tablet, Take 1 tablet (500 mg total) by mouth 2 (two) times a day with meals, Disp: 360 tablet, Rfl: 0    Omega-3 Fatty Acids (FISH OIL DOUBLE STRENGTH) 1200 MG CAPS, Take 1 capsule by mouth 2 (two) times a day, Disp: , Rfl:     ONETOUCH DELICA LANCETS 56X MISC, 1 Units by Does not apply route daily, Disp: , Rfl:     oxyCODONE (ROXICODONE) 10 MG TABS, Take 1 tablet (10 mg total) by mouth every 4 (four) hours as needed for moderate pain Max Daily Amount: 60 mg, Disp: 60 tablet, Rfl: 0    TRULICITY 1 5 EO/0 7FO SOPN, , Disp: , Rfl:     cyclobenzaprine (FLEXERIL) 10 mg tablet, Take 1 tablet (10 mg total) by mouth 3 (three) times a day for 5 days, Disp: 30 tablet, Rfl: 0    Dulaglutide 1 5 MG/0 5ML SOPN, Inject 0 5 mL (1 5 mg total) under the skin once a week for 90 days, Disp: 12 pen, Rfl: 3    ALLERGIES:  No Known Allergies    REVIEW OF SYSTEMS:  Review of Systems   Constitutional: Negative for chills, fever and unexpected weight change  HENT: Negative for hearing loss, nosebleeds and sore throat  Eyes: Negative for pain, redness and visual disturbance  Respiratory: Negative for cough, shortness of breath and wheezing  Cardiovascular: Negative for chest pain, palpitations and leg swelling  Gastrointestinal: Negative for abdominal pain, nausea and vomiting  Endocrine: Negative for polydipsia and polyuria  Genitourinary: Negative for dysuria and hematuria  Musculoskeletal: Negative for arthralgias, joint swelling and myalgias  Skin: Negative for rash and wound  Neurological: Negative for light-headedness, numbness and headaches  Psychiatric/Behavioral: Negative for decreased concentration, dysphoric mood and suicidal ideas  The patient is not nervous/anxious  VITALS:  Vitals:       LABS:  HgA1c:   Lab Results   Component Value Date    HGBA1C 6 8 (H) 12/21/2018     BMP:   Lab Results   Component Value Date    GLUCOSE 316 (H) 12/26/2017    CALCIUM 9 5 05/30/2018     (L) 12/26/2017    K 4 3 12/21/2018    CO2 25 12/21/2018    CL 99 12/21/2018    BUN 16 12/21/2018    CREATININE 1 13 05/30/2018       _____________________________________________________  PHYSICAL EXAMINATION:  General: well developed and well nourished, alert, oriented times 3 and appears comfortable  Psychiatric: Normal  HEENT: Trachea Midline, No torticollis  Pulmonary: No audible wheezing or respiratory distress   Skin: No masses, erthema, lacerations, fluctation, ulcerations  Neurovascular: Sensation Intact to the Median, Ulnar, Radial Nerve, Motor Intact to the Median, Ulnar, Radial Nerve and Pulses Intact    MUSCULOSKELETAL EXAMINATION:  Left Ulnar Nerve Exam:    Negative intrinsic atrophy  Negative deformity at the elbow  Full range of motion with flexion and extension of the elbow  Negative ulnar nerve compression test at the elbow   Positive tinels over the ulnar nerve at the elbow  Tinel's at [facility] canal positive  Positive cross finger test in the long and index fingers  FDP strength is 5/5 to the ring finger  FDP strength is 5/5 to the small finger  Intrinsic strength 4/5      2 point discrimination is 4 mm throughout median nerve distribution and 2 point discrimination is 5 mm of the ulnar side of the ring finger and greater than 8 mm on the small finger          ___________________________________________________  STUDIES REVIEWED:  No studies reviewed  PROCEDURES PERFORMED:  Procedures  No Procedures performed today    _____________________________________________________  ASSESSMENT/PLAN:    Ulnar nerve neuropathy, 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

## 2019-01-02 DIAGNOSIS — Z79.4 TYPE 2 DIABETES MELLITUS WITHOUT COMPLICATION, WITH LONG-TERM CURRENT USE OF INSULIN (HCC): Primary | ICD-10-CM

## 2019-01-02 DIAGNOSIS — E11.9 TYPE 2 DIABETES MELLITUS WITHOUT COMPLICATION, WITH LONG-TERM CURRENT USE OF INSULIN (HCC): Primary | ICD-10-CM

## 2019-01-02 DIAGNOSIS — IMO0001 UNCONTROLLED TYPE 2 DIABETES MELLITUS WITHOUT COMPLICATION, WITHOUT LONG-TERM CURRENT USE OF INSULIN: ICD-10-CM

## 2019-01-02 RX ORDER — DULAGLUTIDE 1.5 MG/.5ML
INJECTION, SOLUTION SUBCUTANEOUS
Refills: 0 | OUTPATIENT
Start: 2019-01-02

## 2019-01-08 ENCOUNTER — TELEPHONE (OUTPATIENT)
Dept: OBGYN CLINIC | Facility: CLINIC | Age: 44
End: 2019-01-08

## 2019-01-08 NOTE — TELEPHONE ENCOUNTER
Patient called asking if we received his results from the EMG that he had done on 1/3/19  Patient wanted to know if he needs a follow up appointment  I told the patient that I would ask Dr So Tang and return a phone call to him  I did look in the patient's chart and I do not see any outside records that were scanned in

## 2019-01-08 NOTE — TELEPHONE ENCOUNTER
Called Patient and Left message asking for him to return my call to schedule a follow up appointment with Dr Andrez Ayala to review his EMG results

## 2019-01-10 DIAGNOSIS — R52 PAIN: Primary | ICD-10-CM

## 2019-01-10 RX ORDER — IBUPROFEN 600 MG/1
600 TABLET ORAL EVERY 8 HOURS
Qty: 90 TABLET | Refills: 1 | Status: SHIPPED | OUTPATIENT
Start: 2019-01-10 | End: 2019-05-23 | Stop reason: SDUPTHER

## 2019-01-12 ENCOUNTER — HOSPITAL ENCOUNTER (OUTPATIENT)
Dept: ULTRASOUND IMAGING | Facility: HOSPITAL | Age: 44
Discharge: HOME | End: 2019-01-12
Payer: COMMERCIAL

## 2019-01-12 ENCOUNTER — HOSPITAL ENCOUNTER (OUTPATIENT)
Dept: ULTRASOUND IMAGING | Facility: HOSPITAL | Age: 44
End: 2019-01-12
Payer: COMMERCIAL

## 2019-01-12 DIAGNOSIS — D69.6 THROMBOCYTOPENIA (HCC): ICD-10-CM

## 2019-01-12 PROCEDURE — 76700 US EXAM ABDOM COMPLETE: CPT

## 2019-01-15 DIAGNOSIS — K76.0 FATTY INFILTRATION OF LIVER: ICD-10-CM

## 2019-01-15 DIAGNOSIS — R16.2 HEPATOSPLENOMEGALY: Primary | ICD-10-CM

## 2019-01-18 ENCOUNTER — OFFICE VISIT (OUTPATIENT)
Dept: OBGYN CLINIC | Facility: CLINIC | Age: 44
End: 2019-01-18
Payer: COMMERCIAL

## 2019-01-18 VITALS
DIASTOLIC BLOOD PRESSURE: 84 MMHG | HEIGHT: 74 IN | WEIGHT: 315 LBS | SYSTOLIC BLOOD PRESSURE: 156 MMHG | BODY MASS INDEX: 40.43 KG/M2 | HEART RATE: 78 BPM

## 2019-01-18 DIAGNOSIS — I73.89 HYPOTHENAR HAMMER SYNDROME (HCC): Primary | ICD-10-CM

## 2019-01-18 DIAGNOSIS — IMO0001 UNCONTROLLED TYPE 2 DIABETES MELLITUS WITHOUT COMPLICATION, WITHOUT LONG-TERM CURRENT USE OF INSULIN: ICD-10-CM

## 2019-01-18 PROCEDURE — 99213 OFFICE O/P EST LOW 20 MIN: CPT | Performed by: ORTHOPAEDIC SURGERY

## 2019-01-18 NOTE — PROGRESS NOTES
CHIEF COMPLAINT:  Chief Complaint   Patient presents with    Left Hand - Follow-up       SUBJECTIVE:  Zaida Sams is a 37y o  year old male who presents to the office for review of left upper extremity EMG  Patient's primary complaint is numbness and tingling to his left small and ring fingers  Patient is in on on for approximately 6 weeks  Patient states he has difficulty abducting his small finger  Patient has difficulty opening and closing buttons and gripping objects  Patient denies injury  Patient states that many years ago he was hospitalized for left elbow pain  PAST MEDICAL HISTORY:  Past Medical History:   Diagnosis Date    CPAP (continuous positive airway pressure) dependence     Diabetes ([address] )     HTN (hypertension)     Kidney stone     RIGHT    Morbid obesity with BMI of 40 0-44 9, adult (HCC)     SANCHO on CPAP        PAST SURGICAL HISTORY:  Past Surgical History:   Procedure Laterality Date    [place] CYSTO/URETERO W/LITHOTRIPSY &INDWELL STENT INSRT Right 5/1/2017    Procedure: CYSTOSCOPY URETEROSCOPY WITH LITHOTRIPSY HOLMIUM LASER, RETROGRADE PYELOGRAM AND INSERTION STENT URETERAL;  Surgeon: Sascha Mejia MD;  [facility];  Service: Urology    [facility] ESWL Left 6/19/2017    Procedure: Sascha Lentz SHOCKWAVE (ESWL);   Surgeon: Sascha Mejia MD;  [facility];  Service: Urology       FAMILY HISTORY:  Family History   Problem Relation Age of Onset    Bone cancer Father     Prostate cancer Maternal Grandfather     Cancer Family     Hypertension Family        SOCIAL HISTORY:  Social History   Substance Use Topics    Smoking status: Never Smoker    Smokeless tobacco: Never Used      Comment: Former Smoker as per allscripts    Alcohol use No       MEDICATIONS:    Current Outpatient Prescriptions:     allopurinol (ZYLOPRIM) 300 mg tablet, Take 1 tablet (300 mg total) by mouth daily, Disp: 90 tablet, Rfl: 0    atorvastatin (LIPITOR) 80 mg tablet, Take 1 tablet (80 mg total) by mouth daily, Disp: 90 tablet, Rfl: 0    Blood Glucose Monitoring Suppl (ONE TOUCH ULTRA 2) w/Device KIT, 1 kit by Does not apply route daily, Disp: , Rfl:     Choline Fenofibrate (FENOFIBRIC ACID) 135 MG CPDR, Take 1 capsule (135 mg total) by mouth daily, Disp: 90 capsule, Rfl: 0    cyclobenzaprine (FLEXERIL) 10 mg tablet, Take 1 tablet (10 mg total) by mouth 3 (three) times a day, Disp: 60 tablet, Rfl: 0    Dulaglutide 1 5 MG/0 5ML SOPN, Inject 0 5 mL (1 5 mg total) under the skin once a week for 90 days, Disp: 12 pen, Rfl: 2    FLUCELVAX 0 5 ML CATRACHO, , Disp: , Rfl:     glucose blood test strip, by In Vitro route Twice daily, Disp: , Rfl:     ibuprofen (MOTRIN) 600 mg tablet, Take 1 tablet (600 mg total) by mouth every 8 (eight) hours, Disp: 90 tablet, Rfl: 1    Icosapent Ethyl (VASCEPA) 1 g CAPS, Take 2 capsules by mouth every 12 (twelve) hours, Disp: , Rfl:     indomethacin (INDOCIN SR) 75 mg CR capsule, Take 75 mg by mouth daily as needed for pain relief, Disp: , Rfl:     losartan-hydrochlorothiazide (HYZAAR) 100-25 MG per tablet, Take 1 tablet by mouth daily, Disp: 90 tablet, Rfl: 0    metFORMIN (GLUCOPHAGE-XR) 500 mg 24 hr tablet, Take 1 tablet (500 mg total) by mouth 2 (two) times a day with meals, Disp: 360 tablet, Rfl: 0    Omega-3 Fatty Acids (FISH OIL DOUBLE STRENGTH) 1200 MG CAPS, Take 1 capsule by mouth 2 (two) times a day, Disp: , Rfl:     ONETOUCH DELICA LANCETS 09I MISC, 1 Units by Does not apply route daily, Disp: , Rfl:     oxyCODONE (ROXICODONE) 10 MG TABS, Take 1 tablet (10 mg total) by mouth every 4 (four) hours as needed for moderate pain Max Daily Amount: 60 mg, Disp: 60 tablet, Rfl: 0    TRULICITY 1 5 WR/5 1RY SOPN, , Disp: , Rfl:     cyclobenzaprine (FLEXERIL) 10 mg tablet, Take 1 tablet (10 mg total) by mouth 3 (three) times a day for 5 days, Disp: 30 tablet, Rfl: 0    ALLERGIES:  No Known Allergies    REVIEW OF SYSTEMS:  Review of Systems   Constitutional: Negative for chills, fever and unexpected weight change  HENT: Negative for hearing loss, nosebleeds and sore throat  Eyes: Negative for pain, redness and visual disturbance  Respiratory: Negative for cough, shortness of breath and wheezing  Cardiovascular: Negative for chest pain, palpitations and leg swelling  Gastrointestinal: Negative for abdominal pain, nausea and vomiting  Endocrine: Negative for polydipsia and polyuria  Genitourinary: Negative for dysuria and hematuria  Musculoskeletal: Negative for arthralgias, joint swelling and myalgias  Skin: Negative for rash and wound  Neurological: Negative for light-headedness, numbness and headaches  Psychiatric/Behavioral: Negative for decreased concentration, dysphoric mood and suicidal ideas  The patient is not nervous/anxious  VITALS:  Vitals:    01/18/19 1548   BP: 156/84   Pulse: 78       LABS:  HgA1c:   Lab Results   Component Value Date    HGBA1C 6 8 (H) 12/21/2018     BMP:   Lab Results   Component Value Date    GLUCOSE 316 (H) 12/26/2017    CALCIUM 9 5 05/30/2018     (L) 12/26/2017    K 4 3 12/21/2018    CO2 25 12/21/2018    CL 99 12/21/2018    BUN 16 12/21/2018    CREATININE 1 13 05/30/2018       _____________________________________________________  PHYSICAL EXAMINATION:  General: well developed and well nourished, alert, oriented times 3 and appears comfortable  Psychiatric: Normal  HEENT: Trachea Midline, No torticollis  Pulmonary: No audible wheezing or respiratory distress   Skin: No masses, erthema, lacerations, fluctation, ulcerations  Neurovascular: Sensation Intact to the Median, Ulnar, Radial Nerve, Motor Intact to the Median, Ulnar, Radial Nerve and Pulses Intact    MUSCULOSKELETAL EXAMINATION:  Left Ulnar Nerve Exam:    Negative intrinsic atrophy  Negative deformity at the elbow  Full range of motion with flexion and extension of the elbow        Positive cross finger test in the long and index fingers  FDP strength is 5/5 to the ring finger  FDP strength is 5/5 to the small finger  Intrinsic strength 4/5 Tinel's at [place]'s canal positive    Two point discrimination tested at last visit is 4 mm throughout median nerve distribution 5 mm on the ulnar side of the ring finger and greater than 8 mm on the small finger  Positive rachna's test   ___________________________________________________  STUDIES REVIEWED:  EMG of the left upper extremity performed 01/03/2019 shows 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 and mild carpal tunnel      PROCEDURES PERFORMED:  Procedures  No Procedures performed today    _____________________________________________________  ASSESSMENT/PLAN:    Rule out left hypothenar hammer syndrome   * MRA left upper extremity focusing on hand,wrist and guyon's canal  * Pt will follow up in the office after test       Follow Up:  Return for after MRA        To Do Next Visit:  Re-evaluation of current issue        Scribe Attestation    I,:   Kj Mercedes am acting as a scribe while in the presence of the attending physician :        I,:   Morgan Rivas MD personally performed the services described in this documentation    as scribed in my presence :

## 2019-01-22 ENCOUNTER — CONSULT (OUTPATIENT)
Dept: HEMATOLOGY ONCOLOGY | Facility: CLINIC | Age: 44
End: 2019-01-22
Payer: COMMERCIAL

## 2019-01-22 VITALS
RESPIRATION RATE: 18 BRPM | BODY MASS INDEX: 40.43 KG/M2 | DIASTOLIC BLOOD PRESSURE: 84 MMHG | HEART RATE: 79 BPM | SYSTOLIC BLOOD PRESSURE: 130 MMHG | WEIGHT: 315 LBS | TEMPERATURE: 96.8 F | OXYGEN SATURATION: 97 % | HEIGHT: 74 IN

## 2019-01-22 DIAGNOSIS — R71.8 MICROCYTOSIS: Primary | ICD-10-CM

## 2019-01-22 DIAGNOSIS — R78.71 ELEVATED BLOOD LEAD LEVEL: ICD-10-CM

## 2019-01-22 PROCEDURE — 99214 OFFICE O/P EST MOD 30 MIN: CPT | Performed by: INTERNAL MEDICINE

## 2019-01-22 NOTE — PROGRESS NOTES
Hematology/Oncology Outpatient Consult  Corey Scott 37 y o  male 1975 [id number]    Date:  1/22/2019    Assessment and Plan:  1  Microcytosis  The exact etiology of his microcytosis is most likely reactive in nature due to  chronic disease  He does have gout and other comorbidities which makes him prone to have a chronic inflammatory process  We will pursue the initial workup which would also include anemia workup, and lead level since he stated that he gets exposed occasionally to lead material at work  The copper and zinc level can be checked in the future if the initial workup is nonconclusive     - CBC and differential  - Comprehensive metabolic panel  - LD,Blood  - C-reactive protein  - Sedimentation rate, automated  - Ferritin  - Iron Panel  - Vitamin B12  - Reticulocytes  - IgG, IgA, IgM  - Protein, Total and Protein Electrophoresis with Immunofixation  - Lead, blood; Future    2  Hepatosplenomegaly: This is most likely related to his fatty liver and morbid obesity  I do agree with the upper and lower endoscopy and further evaluation by the GI team   We will continue to monitor him closely  HPI:  This is a a 57-year-old gentleman with multiple comorbid conditions including history of diabetes mellitus, hypertension, morbid obesity, hyperlipidemia, hypertriglyceridemia and gout  The patient was found to have a low MCV with the low normal hemoglobin level on the most recent blood work from the 21st of December 2018  He also had an ultrasound of the abdomen on the 12 of December 2019 which showed hepatosplenomegaly with fatty liver  Interval history:  He Came for further evaluation of his microcytosis  He has chronic arthritis most likely related to his obesity and gout  He denies bleeding from any site  He stated that he is getting a GI evaluation pretty soon for his hepatosplenomegaly  ROS: Review of Systems   Constitutional: Negative for appetite change, diaphoresis, fatigue and fever  HENT: Negative for congestion, dental problem, facial swelling, hearing loss, tinnitus and trouble swallowing  Eyes: Negative for visual disturbance  Respiratory: Negative for cough, chest tightness, shortness of breath and wheezing  Cardiovascular: Negative for chest pain and leg swelling  Gastrointestinal: Negative for abdominal distention, abdominal pain, blood in stool, constipation, diarrhea, nausea and vomiting  Genitourinary: Negative for dysuria, hematuria and urgency  Musculoskeletal: Positive for arthralgias and back pain  Negative for myalgias and neck pain  Skin: Negative  Negative for color change, pallor, rash and wound  Neurological: Positive for numbness  Negative for dizziness, weakness and headaches  Hematological: Negative for adenopathy  Psychiatric/Behavioral: Negative for agitation, behavioral problems, confusion, hallucinations, self-injury and sleep disturbance  The patient is not nervous/anxious and is not hyperactive  Past Medical History:   Diagnosis Date    CPAP (continuous positive airway pressure) dependence     Diabetes ([address] )     HTN (hypertension)     Kidney stone     RIGHT    Morbid obesity with BMI of 40 0-44 9, adult (HCC)     SANCHO on CPAP        Past Surgical History:   Procedure Laterality Date    [place] CYSTO/URETERO W/LITHOTRIPSY &INDWELL STENT INSRT Right 5/1/2017    Procedure: CYSTOSCOPY URETEROSCOPY WITH LITHOTRIPSY HOLMIUM LASER, RETROGRADE PYELOGRAM AND INSERTION STENT URETERAL;  Surgeon: Alyson Madden MD;  [facility];  Service: Urology    [facility] ESWL Left 6/19/2017    Procedure: Chastity Cross EXTRACORPORAL SHOCKWAVE (ESWL);   Surgeon: Alyson Madden MD;  [facility];  Service: Urology       Social History     Social History    Marital status: /Civil Union     Spouse name: N/A    Number of children: N/A    Years of education: N/A     Social History Main Topics    Smoking status: Never Smoker    Smokeless tobacco: Never Used      Comment: Former Smoker as per allscripts    Alcohol use No    Drug use: No    Sexual activity: Not Asked     Other Topics Concern    None     Social History Narrative    Daily Coffee Consumption    Daily Tea Consumption       Family History   Problem Relation Age of Onset    Bone cancer Father     Prostate cancer Maternal Grandfather     Cancer Family     Hypertension Family        No Known Allergies      Current Outpatient Prescriptions:     allopurinol (ZYLOPRIM) 300 mg tablet, Take 1 tablet (300 mg total) by mouth daily, Disp: 90 tablet, Rfl: 0    atorvastatin (LIPITOR) 80 mg tablet, Take 1 tablet (80 mg total) by mouth daily, Disp: 90 tablet, Rfl: 0    Blood Glucose Monitoring Suppl (ONE TOUCH ULTRA 2) w/Device KIT, 1 kit by Does not apply route daily, Disp: , Rfl:     Choline Fenofibrate (FENOFIBRIC ACID) 135 MG CPDR, Take 1 capsule (135 mg total) by mouth daily, Disp: 90 capsule, Rfl: 0    cyclobenzaprine (FLEXERIL) 10 mg tablet, Take 1 tablet (10 mg total) by mouth 3 (three) times a day, Disp: 60 tablet, Rfl: 0    Dulaglutide 1 5 MG/0 5ML SOPN, Inject 0 5 mL (1 5 mg total) under the skin once a week for 90 days, Disp: 12 pen, Rfl: 2    FLUCELVAX 0 5 ML CATRACHO, , Disp: , Rfl:     glucose blood test strip, by In Vitro route Twice daily, Disp: , Rfl:     ibuprofen (MOTRIN) 600 mg tablet, Take 1 tablet (600 mg total) by mouth every 8 (eight) hours, Disp: 90 tablet, Rfl: 1    Icosapent Ethyl (VASCEPA) 1 g CAPS, Take 2 capsules by mouth every 12 (twelve) hours, Disp: , Rfl:     indomethacin (INDOCIN SR) 75 mg CR capsule, Take 75 mg by mouth daily as needed for pain relief, Disp: , Rfl:     losartan-hydrochlorothiazide (HYZAAR) 100-25 MG per tablet, Take 1 tablet by mouth daily, Disp: 90 tablet, Rfl: 0    metFORMIN (GLUCOPHAGE-XR) 500 mg 24 hr tablet, Take 1 tablet (500 mg total) by mouth 2 (two) times a day with meals, Disp: 360 tablet, Rfl: 0   Omega-3 Fatty Acids (FISH OIL DOUBLE STRENGTH) 1200 MG CAPS, Take 1 capsule by mouth 2 (two) times a day, Disp: , Rfl:     ONETOUCH DELICA LANCETS 22M MISC, 1 Units by Does not apply route daily, Disp: , Rfl:     oxyCODONE (ROXICODONE) 10 MG TABS, Take 1 tablet (10 mg total) by mouth every 4 (four) hours as needed for moderate pain Max Daily Amount: 60 mg, Disp: 60 tablet, Rfl: 0    TRULICITY 1 5 MM/8 2ID SOPN, , Disp: , Rfl:     cyclobenzaprine (FLEXERIL) 10 mg tablet, Take 1 tablet (10 mg total) by mouth 3 (three) times a day for 5 days, Disp: 30 tablet, Rfl: 0      Physical Exam:  /84 (BP Location: Left arm, Cuff Size: Large)   Pulse 79   Temp (!) 96 8 °F (36 °C) (Tympanic)   Resp 18   Ht 6' 2" (1 88 m)   Wt (!) 149 kg (329 lb 3 2 oz)   SpO2 97%   BMI 42 27 kg/m²     Physical Exam   Constitutional: He is oriented to person, place, and time  He appears well-developed and well-nourished  HENT:   Head: Normocephalic and atraumatic  Nose: Nose normal    Mouth/Throat: Oropharynx is clear and moist    Eyes: Pupils are equal, round, and reactive to light  Conjunctivae and EOM are normal  Right eye exhibits no discharge  Left eye exhibits no discharge  No scleral icterus  Neck: Normal range of motion  Neck supple  No tracheal deviation present  No thyromegaly present  Cardiovascular: Normal rate, regular rhythm and normal heart sounds  Exam reveals no friction rub  No murmur heard  Pulmonary/Chest: Effort normal and breath sounds normal  No respiratory distress  He has no wheezes  He has no rales  He exhibits no tenderness  Abdominal: Soft  Bowel sounds are normal  He exhibits no distension and no mass  There is no hepatosplenomegaly, splenomegaly or hepatomegaly  There is no tenderness  There is no rebound and no guarding  Obese   Musculoskeletal: Normal range of motion  He exhibits no edema, tenderness or deformity  Lymphadenopathy:     He has no cervical adenopathy     Neurological: He is alert and oriented to person, place, and time  He has normal reflexes  No cranial nerve deficit  Coordination normal    Skin: Skin is warm and dry  No rash noted  No erythema  No pallor  Psychiatric: He has a normal mood and affect  His behavior is normal  Judgment and thought content normal          Labs:  Lab Results   Component Value Date    WBC 4 8 12/21/2018    HGB 13 5 12/21/2018    HCT 40 2 12/21/2018    MCV 78 (L) 12/21/2018     12/21/2018     Lab Results   Component Value Date     (L) 12/26/2017    K 4 3 12/21/2018    CL 99 12/21/2018    CO2 25 12/21/2018    BUN 16 12/21/2018    CREATININE 1 13 05/30/2018    GLUCOSE 316 (H) 12/26/2017    GLUF 186 (H) 05/30/2018    CALCIUM 9 5 05/30/2018    AST 27 05/30/2018    ALT 63 05/30/2018    ALKPHOS 84 05/30/2018    PROT 7 6 12/26/2017    BILITOT 0 9 12/26/2017    EGFR 78 05/30/2018       Patient voiced understanding and agreement in the above discussion  Aware to contact our office with questions/symptoms in the interim

## 2019-02-01 ENCOUNTER — TELEPHONE (OUTPATIENT)
Dept: PAIN MEDICINE | Facility: MEDICAL CENTER | Age: 44
End: 2019-02-01

## 2019-02-01 DIAGNOSIS — Z01.89 ENCOUNTER FOR ROUTINE LABORATORY TESTING: Primary | ICD-10-CM

## 2019-02-06 ENCOUNTER — OFFICE VISIT (OUTPATIENT)
Dept: GASTROENTEROLOGY | Facility: CLINIC | Age: 44
End: 2019-02-06
Payer: COMMERCIAL

## 2019-02-06 VITALS
DIASTOLIC BLOOD PRESSURE: 86 MMHG | WEIGHT: 315 LBS | BODY MASS INDEX: 42.5 KG/M2 | HEART RATE: 86 BPM | SYSTOLIC BLOOD PRESSURE: 120 MMHG

## 2019-02-06 DIAGNOSIS — K76.0 FATTY LIVER: Primary | ICD-10-CM

## 2019-02-06 DIAGNOSIS — R16.2 HEPATOSPLENOMEGALY: ICD-10-CM

## 2019-02-06 DIAGNOSIS — K76.0 FATTY INFILTRATION OF LIVER: ICD-10-CM

## 2019-02-06 LAB
BUN SERPL-MCNC: 16 MG/DL (ref 6–24)
BUN/CREAT SERPL: 18 (ref 9–20)
CHLORIDE SERPL-SCNC: 100 MMOL/L (ref 96–106)
CO2 SERPL-SCNC: 23 MMOL/L (ref 20–29)
CREAT SERPL-MCNC: 0.88 MG/DL (ref 0.76–1.27)
GLUCOSE SERPL-MCNC: 156 MG/DL (ref 65–99)
POTASSIUM SERPL-SCNC: 4.3 MMOL/L (ref 3.5–5.2)
SL AMB EGFR AFRICAN AMERICAN: 120 ML/MIN/1.73
SL AMB EGFR NON AFRICAN AMERICAN: 104 ML/MIN/1.73
SODIUM SERPL-SCNC: 139 MMOL/L (ref 134–144)

## 2019-02-06 PROCEDURE — 99244 OFF/OP CNSLTJ NEW/EST MOD 40: CPT | Performed by: INTERNAL MEDICINE

## 2019-02-06 NOTE — PROGRESS NOTES
Consultation - [facility]  Robi Hardy 1975 37 y o  male     ASSESSMENT @ PLAN:   He is a 75-year-old male with hepatosplenomegaly and morbid obesity with a BMI of 42 and multiple manifestations of the metabolic syndrome with fatty liver disease  We talked about this at length and I told him that this is a harbinger for ill health the rest of his life  1 I told him to lose weight and gain muscle    2 I told him to control his diabetes    3 if he loses is approximately 30-50 lb I would expect his liver and spleen size to become smaller    Chief Complaint:  Fatty liver disease and hepatosplenomegaly    HPI:   He is a 75-year-old male with fatty liver disease  He is morbidly obese with a BMI of 42  He had an ultrasound that showed about a splenomegaly with severe fatty infiltration of the liver and hepatic steatosis  The patient has multiple other manifestations of the metabolic syndrome including diabetes central adiposity high triglycerides and hypertension  We talked about the is sore min of these problems with the associated obesity epidemic in the [place]  The patient reports that he has gained a lot of weight in the last decade  The patient denies nausea vomiting fevers chills melena or hematochezia he denies a family history of liver disease or excessive else of alcohol intake  REVIEW OF SYSTEMS:     CONSTITUTIONAL: Denies any fever, chills, or rigors  Good appetite, and no recent weight loss  HEENT: No earache or tinnitus  Denies hearing loss or visual disturbances  CARDIOVASCULAR: No chest pain or palpitations  RESPIRATORY: Denies any cough, hemoptysis, shortness of breath or dyspnea on exertion  GASTROINTESTINAL: As noted in the History of Present Illness  GENITOURINARY: No problems with urination  Denies any hematuria or dysuria  NEUROLOGIC: No dizziness or vertigo, denies headaches  MUSCULOSKELETAL: Denies any muscle or joint pain     SKIN: Denies skin rashes or itching  ENDOCRINE: Denies excessive thirst  Denies intolerance to heat or cold  PSYCHOSOCIAL: Denies depression or anxiety  Denies any recent memory loss  Past Medical History:   Diagnosis Date    CPAP (continuous positive airway pressure) dependence     Diabetes ([address] )     HTN (hypertension)     Kidney stone     RIGHT    Morbid obesity with BMI of 40 0-44 9, adult (HCC)     SANCHO on CPAP       Past Surgical History:   Procedure Laterality Date    [place] CYSTO/URETERO W/LITHOTRIPSY &INDWELL STENT INSRT Right 5/1/2017    Procedure: CYSTOSCOPY URETEROSCOPY WITH LITHOTRIPSY HOLMIUM LASER, RETROGRADE PYELOGRAM AND INSERTION STENT URETERAL;  Surgeon: Angelina Daly MD;  [facility];  Service: Urology    [facility] ESWL Left 6/19/2017    Procedure: Ladene Drain EXTRACORPORAL SHOCKWAVE (ESWL); Surgeon: Angelina Daly MD;  [facility];  Service: Urology     Social History     Social History    Marital status: /Civil Union     Spouse name: N/A    Number of children: N/A    Years of education: N/A     Occupational History    Not on file  Social History Main Topics    Smoking status: Former Smoker     Quit date: 2009    Smokeless tobacco: Never Used      Comment: Former Smoker as per allscripts    Alcohol use Yes    Drug use: No    Sexual activity: Not on file     Other Topics Concern    Not on file     Social History Narrative    Daily Coffee Consumption    Daily Tea Consumption     Family History   Problem Relation Age of Onset    Bone cancer Father     Prostate cancer Maternal Grandfather     Cancer Family     Hypertension Family      Patient has no known allergies    Current Outpatient Prescriptions   Medication Sig Dispense Refill    allopurinol (ZYLOPRIM) 300 mg tablet Take 1 tablet (300 mg total) by mouth daily 90 tablet 0    atorvastatin (LIPITOR) 80 mg tablet Take 1 tablet (80 mg total) by mouth daily 90 tablet 0    Blood Glucose Monitoring Suppl (ONE TOUCH ULTRA 2) w/Device KIT 1 kit by Does not apply route daily      Choline Fenofibrate (FENOFIBRIC ACID) 135 MG CPDR Take 1 capsule (135 mg total) by mouth daily 90 capsule 0    cyclobenzaprine (FLEXERIL) 10 mg tablet Take 1 tablet (10 mg total) by mouth 3 (three) times a day for 5 days 30 tablet 0    cyclobenzaprine (FLEXERIL) 10 mg tablet Take 1 tablet (10 mg total) by mouth 3 (three) times a day 60 tablet 0    Dulaglutide 1 5 MG/0 5ML SOPN Inject 0 5 mL (1 5 mg total) under the skin once a week for 90 days 12 pen 2    FLUCELVAX 0 5 ML CATRACHO       glucose blood test strip by In Vitro route Twice daily      ibuprofen (MOTRIN) 600 mg tablet Take 1 tablet (600 mg total) by mouth every 8 (eight) hours 90 tablet 1    Icosapent Ethyl (VASCEPA) 1 g CAPS Take 2 capsules by mouth every 12 (twelve) hours      indomethacin (INDOCIN SR) 75 mg CR capsule Take 75 mg by mouth daily as needed for pain relief      losartan-hydrochlorothiazide (HYZAAR) 100-25 MG per tablet Take 1 tablet by mouth daily 90 tablet 0    metFORMIN (GLUCOPHAGE-XR) 500 mg 24 hr tablet Take 1 tablet (500 mg total) by mouth 2 (two) times a day with meals 360 tablet 0    Omega-3 Fatty Acids (FISH OIL DOUBLE STRENGTH) 1200 MG CAPS Take 1 capsule by mouth 2 (two) times a day      ONETOUCH DELICA LANCETS 03F MISC 1 Units by Does not apply route daily      oxyCODONE (ROXICODONE) 10 MG TABS Take 1 tablet (10 mg total) by mouth every 4 (four) hours as needed for moderate pain Max Daily Amount: 60 mg 60 tablet 0    TRULICITY 1 5 UR/1 9CZ SOPN        No current facility-administered medications for this visit  Blood pressure 120/86, pulse 86, weight (!) 150 kg (331 lb)      PHYSICAL EXAM:     General Appearance:   Alert, cooperative, no distress, appears stated age    HEENT:   Normocephalic, atraumatic, anicteric      Neck:  Supple, symmetrical, trachea midline, no adenopathy;    thyroid: no enlargement/tenderness/nodules; no carotid  bruit or JVD    Lungs:   Clear to auscultation bilaterally; no rales, rhonchi or wheezing; respirations unlabored    Heart[de-identified]   S1 and S2 normal; regular rate and rhythm; no murmur, rub, or gallop     Abdomen:   Soft, non-tender, non-distended; normal bowel sounds; no masses, no organomegaly    Genitalia:   Deferred    Rectal:   Deferred    Extremities:  No cyanosis, clubbing or edema    Pulses:  2+ and symmetric all extremities    Skin:  Skin color, texture, turgor normal, no rashes or lesions    Lymph nodes:  No palpable cervical, axillary or inguinal lymphadenopathy        Lab Results   Component Value Date    WBC 4 8 12/21/2018    HGB 13 5 12/21/2018    HCT 40 2 12/21/2018    MCV 78 (L) 12/21/2018     12/21/2018     Lab Results   Component Value Date    GLUCOSE 316 (H) 12/26/2017    CALCIUM 9 5 05/30/2018     (L) 12/26/2017    K 4 3 12/21/2018    CO2 25 12/21/2018    CL 99 12/21/2018    BUN 16 12/21/2018    CREATININE 1 13 05/30/2018     Lab Results   Component Value Date    ALT 43 12/21/2018    AST 25 12/21/2018    ALKPHOS 84 05/30/2018    BILITOT 0 9 12/26/2017     No results found for: INR, PROTIME

## 2019-02-06 NOTE — LETTER
February 6, 2019     Radha Subramanian MD  [address]  [address] [facility]  [address]    Patient: Deb Flowers   YOB: 1975   Date of Visit: 2/6/2019       Dear Dr Silvana Runner: Thank you for referring Deb Flowers to me for evaluation  Below are my notes for this consultation  If you have questions, please do not hesitate to call me  I look forward to following your patient along with you  Sincerely,        Anastasiya Gallagher MD        CC: No Recipients  Anastasiya Gallagher MD  2/6/2019  5:02 PM  Sign at close encounter  Consultation - [address] [facility]  Deb Flowers 1975 37 y o  male     ASSESSMENT @ PLAN:   He is a 77-year-old male with hepatosplenomegaly and morbid obesity with a BMI of 42 and multiple manifestations of the metabolic syndrome with fatty liver disease  We talked about this at length and I told him that this is a harbinger for ill health the rest of his life  1 I told him to lose weight and gain muscle    2 I told him to control his diabetes    3 if he loses is approximately 30-50 lb I would expect his liver and spleen size to become smaller    Chief Complaint:  Fatty liver disease and hepatosplenomegaly    HPI:   He is a 77-year-old male with fatty liver disease  He is morbidly obese with a BMI of 42  He had an ultrasound that showed about a splenomegaly with severe fatty infiltration of the liver and hepatic steatosis  The patient has multiple other manifestations of the metabolic syndrome including diabetes central adiposity high triglycerides and hypertension  We talked about the is sore min of these problems with the associated obesity epidemic [place]  The patient reports that he has gained a lot of weight in the last decade  The patient denies nausea vomiting fevers chills melena or hematochezia he denies a family history of liver disease or excessive else of alcohol intake      REVIEW OF SYSTEMS:     CONSTITUTIONAL: Denies any fever, chills, or rigors  Good appetite, and no recent weight loss  HEENT: No earache or tinnitus  Denies hearing loss or visual disturbances  CARDIOVASCULAR: No chest pain or palpitations  RESPIRATORY: Denies any cough, hemoptysis, shortness of breath or dyspnea on exertion  GASTROINTESTINAL: As noted in the History of Present Illness  GENITOURINARY: No problems with urination  Denies any hematuria or dysuria  NEUROLOGIC: No dizziness or vertigo, denies headaches  MUSCULOSKELETAL: Denies any muscle or joint pain  SKIN: Denies skin rashes or itching  ENDOCRINE: Denies excessive thirst  Denies intolerance to heat or cold  PSYCHOSOCIAL: Denies depression or anxiety  Denies any recent memory loss  Past Medical History:   Diagnosis Date    CPAP (continuous positive airway pressure) dependence     Diabetes [address] [address]     HTN (hypertension)     Kidney stone     RIGHT    Morbid obesity with BMI of 40 0-44 9, adult (HCC)     SANCHO on CPAP       Past Surgical History:   Procedure Laterality Date    [place] CYSTO/URETERO W/LITHOTRIPSY &INDWELL STENT INSRT Right 5/1/2017    Procedure: CYSTOSCOPY URETEROSCOPY WITH LITHOTRIPSY HOLMIUM LASER, RETROGRADE PYELOGRAM AND INSERTION STENT URETERAL;  Surgeon: Aislinn Daniel MD;  [facility];  Service: Urology    [facility] ESWL Left 6/19/2017    Procedure: Brooklynn Eagle EXTRACORPORAL SHOCKWAVE (ESWL); Surgeon: Aislinn Daniel MD;  [facility];  Service: Urology     Social History     Social History    Marital status: /Civil Union     Spouse name: N/A    Number of children: N/A    Years of education: N/A     Occupational History    Not on file       Social History Main Topics    Smoking status: Former Smoker     Quit date: 2009    Smokeless tobacco: Never Used      Comment: Former Smoker as per allscripts    Alcohol use Yes    Drug use: No    Sexual activity: Not on file     Other Topics Concern    Not on file     Social History Narrative    Daily Coffee Consumption    Daily Tea Consumption     Family History   Problem Relation Age of Onset    Bone cancer Father     Prostate cancer Maternal Grandfather     Cancer Family     Hypertension Family      Patient has no known allergies    Current Outpatient Prescriptions   Medication Sig Dispense Refill    allopurinol (ZYLOPRIM) 300 mg tablet Take 1 tablet (300 mg total) by mouth daily 90 tablet 0    atorvastatin (LIPITOR) 80 mg tablet Take 1 tablet (80 mg total) by mouth daily 90 tablet 0    Blood Glucose Monitoring Suppl (ONE TOUCH ULTRA 2) w/Device KIT 1 kit by Does not apply route daily      Choline Fenofibrate (FENOFIBRIC ACID) 135 MG CPDR Take 1 capsule (135 mg total) by mouth daily 90 capsule 0    cyclobenzaprine (FLEXERIL) 10 mg tablet Take 1 tablet (10 mg total) by mouth 3 (three) times a day for 5 days 30 tablet 0    cyclobenzaprine (FLEXERIL) 10 mg tablet Take 1 tablet (10 mg total) by mouth 3 (three) times a day 60 tablet 0    Dulaglutide 1 5 MG/0 5ML SOPN Inject 0 5 mL (1 5 mg total) under the skin once a week for 90 days 12 pen 2    FLUCELVAX 0 5 ML CATRACHO       glucose blood test strip by In Vitro route Twice daily      ibuprofen (MOTRIN) 600 mg tablet Take 1 tablet (600 mg total) by mouth every 8 (eight) hours 90 tablet 1    Icosapent Ethyl (VASCEPA) 1 g CAPS Take 2 capsules by mouth every 12 (twelve) hours      indomethacin (INDOCIN SR) 75 mg CR capsule Take 75 mg by mouth daily as needed for pain relief      losartan-hydrochlorothiazide (HYZAAR) 100-25 MG per tablet Take 1 tablet by mouth daily 90 tablet 0    metFORMIN (GLUCOPHAGE-XR) 500 mg 24 hr tablet Take 1 tablet (500 mg total) by mouth 2 (two) times a day with meals 360 tablet 0    Omega-3 Fatty Acids (FISH OIL DOUBLE STRENGTH) 1200 MG CAPS Take 1 capsule by mouth 2 (two) times a day      ONETOUCH DELICA LANCETS 39V MISC 1 Units by Does not apply route daily      oxyCODONE (ROXICODONE) 10 MG TABS Take 1 tablet (10 mg total) by mouth every 4 (four) hours as needed for moderate pain Max Daily Amount: 60 mg 60 tablet 0    TRULICITY 1 5 TF/4 5QR SOPN        No current facility-administered medications for this visit  Blood pressure 120/86, pulse 86, weight (!) 150 kg (331 lb)  PHYSICAL EXAM:     General Appearance:   Alert, cooperative, no distress, appears stated age    HEENT:   Normocephalic, atraumatic, anicteric      Neck:  Supple, symmetrical, trachea midline, no adenopathy;    thyroid: no enlargement/tenderness/nodules; no carotid  bruit or JVD    Lungs:   Clear to auscultation bilaterally; no rales, rhonchi or wheezing; respirations unlabored    Heart[de-identified]   S1 and S2 normal; regular rate and rhythm; no murmur, rub, or gallop     Abdomen:   Soft, non-tender, non-distended; normal bowel sounds; no masses, no organomegaly    Genitalia:   Deferred    Rectal:   Deferred    Extremities:  No cyanosis, clubbing or edema    Pulses:  2+ and symmetric all extremities    Skin:  Skin color, texture, turgor normal, no rashes or lesions    Lymph nodes:  No palpable cervical, axillary or inguinal lymphadenopathy        Lab Results   Component Value Date    WBC 4 8 12/21/2018    HGB 13 5 12/21/2018    HCT 40 2 12/21/2018    MCV 78 (L) 12/21/2018     12/21/2018     Lab Results   Component Value Date    GLUCOSE 316 (H) 12/26/2017    CALCIUM 9 5 05/30/2018     (L) 12/26/2017    K 4 3 12/21/2018    CO2 25 12/21/2018    CL 99 12/21/2018    BUN 16 12/21/2018    CREATININE 1 13 05/30/2018     Lab Results   Component Value Date    ALT 43 12/21/2018    AST 25 12/21/2018    ALKPHOS 84 05/30/2018    BILITOT 0 9 12/26/2017     No results found for: INR, PROTIME

## 2019-02-07 ENCOUNTER — TELEPHONE (OUTPATIENT)
Dept: OBGYN CLINIC | Facility: HOSPITAL | Age: 44
End: 2019-02-07

## 2019-02-07 NOTE — TELEPHONE ENCOUNTER
Caller is Saintclair Mis from [facility] @ [employer]  She has the order for the MRI of the hand request, which asks to call and speak with Dr Tessa Cochran before study  Patient is coming in for study on Saturday 02/09/19  Dr Tessa Cochran please return Estela's call, [phone]  Thank you

## 2019-02-09 ENCOUNTER — HOSPITAL ENCOUNTER (OUTPATIENT)
Dept: MRI IMAGING | Facility: HOSPITAL | Age: 44
Discharge: HOME | End: 2019-02-09
Attending: ORTHOPAEDIC SURGERY
Payer: COMMERCIAL

## 2019-02-09 DIAGNOSIS — I73.89 HYPOTHENAR HAMMER SYNDROME (HCC): ICD-10-CM

## 2019-02-09 PROCEDURE — A9577 INJ MULTIHANCE: HCPCS | Performed by: ORTHOPAEDIC SURGERY

## 2019-02-09 PROCEDURE — C8936 MRA, W/O&W/DYE, UPPER EXTR: HCPCS

## 2019-02-09 RX ADMIN — GADOBENATE DIMEGLUMINE 30 ML: 529 INJECTION, SOLUTION INTRAVENOUS at 08:56

## 2019-02-15 ENCOUNTER — OFFICE VISIT (OUTPATIENT)
Dept: OBGYN CLINIC | Facility: CLINIC | Age: 44
End: 2019-02-15
Payer: COMMERCIAL

## 2019-02-15 VITALS
HEART RATE: 74 BPM | HEIGHT: 74 IN | DIASTOLIC BLOOD PRESSURE: 84 MMHG | BODY MASS INDEX: 40.43 KG/M2 | WEIGHT: 315 LBS | SYSTOLIC BLOOD PRESSURE: 152 MMHG

## 2019-02-15 DIAGNOSIS — G56.02 CARPAL TUNNEL SYNDROME ON LEFT: Primary | ICD-10-CM

## 2019-02-15 PROCEDURE — 99214 OFFICE O/P EST MOD 30 MIN: CPT | Performed by: ORTHOPAEDIC SURGERY

## 2019-02-15 NOTE — PROGRESS NOTES
CHIEF COMPLAINT:  Chief Complaint   Patient presents with    Left Hand - Follow-up       SUBJECTIVE:  Richelle Johnson is a 37y o  year old RHD male who presents to the office to review the MRA of his LUE  Pt continues to have numbness and tingling in his left small and ring fingers  Patient also has difficulty adducting his small finger  Patient states that this has been going on for approximately 10 weeks  Patient has difficulty opening and closing buttons and gripping objects  Patient denies injury  The patient denies any cardiac or pulmonary issues  Denies any history of MI, gastric ulcers, kidney or liver issues  Denies blood thinners  Pt is diabetic  Pt has Roxicodone 10 mg which he takes as needed for pain    PAST MEDICAL HISTORY:  Past Medical History:   Diagnosis Date    CPAP (continuous positive airway pressure) dependence     Diabetes ([address] )     HTN (hypertension)     Kidney stone     RIGHT    Morbid obesity with BMI of 40 0-44 9, adult (HCC)     SANCHO on CPAP        PAST SURGICAL HISTORY:  Past Surgical History:   Procedure Laterality Date    TN CYSTO/URETERO W/LITHOTRIPSY &INDWELL STENT INSRT Right 5/1/2017    Procedure: CYSTOSCOPY URETEROSCOPY WITH LITHOTRIPSY HOLMIUM LASER, RETROGRADE PYELOGRAM AND INSERTION STENT URETERAL;  Surgeon: Humaira Bright MD;  Location: [facility];  Service: Urology    TN FRAGMENT KIDNEY STONE/ ESWL Left 6/19/2017    Procedure: Fabián Johansen EXTRACORPORAL SHOCKWAVE (ESWL);   Surgeon: Humaira Bright MD;  Location: [facility];  Service: Urology       FAMILY HISTORY:  Family History   Problem Relation Age of Onset    Bone cancer Father     Prostate cancer Maternal Grandfather     Cancer Family     Hypertension Family        SOCIAL HISTORY:  Social History     Tobacco Use    Smoking status: Former Smoker     Last attempt to quit: 2009     Years since quitting: 10 1    Smokeless tobacco: Never Used    Tobacco comment: Former Smoker as per [employer] Substance Use Topics    Alcohol use:  Yes    Drug use: No       MEDICATIONS:    Current Outpatient Medications:     allopurinol (ZYLOPRIM) 300 mg tablet, Take 1 tablet (300 mg total) by mouth daily, Disp: 90 tablet, Rfl: 0    atorvastatin (LIPITOR) 80 mg tablet, Take 1 tablet (80 mg total) by mouth daily, Disp: 90 tablet, Rfl: 0    Blood Glucose Monitoring Suppl (ONE TOUCH ULTRA 2) w/Device KIT, 1 kit by Does not apply route daily, Disp: , Rfl:     Choline Fenofibrate (FENOFIBRIC ACID) 135 MG CPDR, Take 1 capsule (135 mg total) by mouth daily, Disp: 90 capsule, Rfl: 0    cyclobenzaprine (FLEXERIL) 10 mg tablet, Take 1 tablet (10 mg total) by mouth 3 (three) times a day, Disp: 60 tablet, Rfl: 0    Dulaglutide 1 5 MG/0 5ML SOPN, Inject 0 5 mL (1 5 mg total) under the skin once a week for 90 days, Disp: 12 pen, Rfl: 2    FLUCELVAX 0 5 ML CATRACHO, , Disp: , Rfl:     glucose blood test strip, by In Vitro route Twice daily, Disp: , Rfl:     ibuprofen (MOTRIN) 600 mg tablet, Take 1 tablet (600 mg total) by mouth every 8 (eight) hours, Disp: 90 tablet, Rfl: 1    Icosapent Ethyl (VASCEPA) 1 g CAPS, Take 2 capsules by mouth every 12 (twelve) hours, Disp: , Rfl:     indomethacin (INDOCIN SR) 75 mg CR capsule, Take 75 mg by mouth daily as needed for pain relief, Disp: , Rfl:     losartan-hydrochlorothiazide (HYZAAR) 100-25 MG per tablet, Take 1 tablet by mouth daily, Disp: 90 tablet, Rfl: 0    metFORMIN (GLUCOPHAGE-XR) 500 mg 24 hr tablet, Take 1 tablet (500 mg total) by mouth 2 (two) times a day with meals, Disp: 360 tablet, Rfl: 0    Omega-3 Fatty Acids (FISH OIL DOUBLE STRENGTH) 1200 MG CAPS, Take 1 capsule by mouth 2 (two) times a day, Disp: , Rfl:     ONETOUCH DELICA LANCETS 61Z MISC, 1 Units by Does not apply route daily, Disp: , Rfl:     oxyCODONE (ROXICODONE) 10 MG TABS, Take 1 tablet (10 mg total) by mouth every 4 (four) hours as needed for moderate pain Max Daily Amount: 60 mg, Disp: 60 tablet, Rfl: 0    TRULICITY 1 5 VG/2 7PG SOPN, , Disp: , Rfl:     cyclobenzaprine (FLEXERIL) 10 mg tablet, Take 1 tablet (10 mg total) by mouth 3 (three) times a day for 5 days, Disp: 30 tablet, Rfl: 0    ALLERGIES:  No Known Allergies    REVIEW OF SYSTEMS:  Review of Systems   Constitutional: Negative for chills, fever and unexpected weight change  HENT: Negative for hearing loss, nosebleeds and sore throat  Eyes: Negative for pain, redness and visual disturbance  Respiratory: Negative for cough, shortness of breath and wheezing  Cardiovascular: Negative for chest pain, palpitations and leg swelling  Gastrointestinal: Negative for abdominal pain, nausea and vomiting  Endocrine: Negative for polydipsia and polyuria  Genitourinary: Negative for dysuria and hematuria  Musculoskeletal: Negative for arthralgias, joint swelling and myalgias  Skin: Negative for rash and wound  Neurological: Positive for numbness  Negative for light-headedness and headaches  Psychiatric/Behavioral: Negative for decreased concentration, dysphoric mood and suicidal ideas  The patient is not nervous/anxious  VITALS:  Vitals:    02/15/19 0815   BP: 152/84   Pulse: 74       LABS:  HgA1c:   Lab Results   Component Value Date    HGBA1C 6 8 (H) 12/21/2018     BMP:   Lab Results   Component Value Date    GLUCOSE 316 (H) 12/26/2017    CALCIUM 9 5 05/30/2018     (L) 12/26/2017    K 4 3 02/05/2019    CO2 23 02/05/2019     02/05/2019    BUN 16 02/05/2019    CREATININE 1 13 05/30/2018       _____________________________________________________  PHYSICAL EXAMINATION:  General: well developed and well nourished, alert, oriented times 3 and appears comfortable  Psychiatric: Normal  HEENT: Trachea Midline, No torticollis  Cardiac:  Regular rate and rhythm  Pulmonary: No respiratory distress  Lung sounds clear to auscultation      Skin: No masses, erythema, lacerations, fluctation, ulcerations  Neurovascular: Sensation intact to the Median Nerve, Sensation Intact to the Radial Nerve and Pulses Intact    MUSCULOSKELETAL EXAMINATION:  Left Ulnar Nerve Exam:  Negative intrinsic atrophy  Negative deformity at the elbow  Full range of motion with flexion and extension of the elbow  Positive cross finger test in the long and index fingers  FDP strength is 5/5 to the ring finger  FDP strength is 5/5 to the small finger  Intrinsic strength 4/5 Tinel's at [place]'s canal positive     Two point discrimination tested at last visit is 4 mm throughout median nerve distribution 5 mm on the ulnar side of the ring finger and greater than 8 mm on the small finger  Positive rachna's test    Left Carpal Tunnel Exam:    Negative thenar atrophy  Negative phalen's test  Negative carpal tunnel compression  Tinel's at [facility] canal positive Opposition strength 5/5  Abduction strength 5/5           ___________________________________________________  STUDIES REVIEWED:  MRA of the left upper extremity shows chronic scapholunate ligament tear with cyst of the scapholunate interval which slightly alter the course of the ulnar artery, cystic structure noted narrowing the ulnar aspect of the carpal tunnel termination of the superficial arch of the radial artery noted      PROCEDURES PERFORMED:  Procedures  No Procedures performed today    _____________________________________________________  ASSESSMENT/PLAN:    Carpal tunnel left    *surgery- left Endoscopic vs open carpal tunnel release with possible Ganglion cyst excision   * detailed consent was signed in the office today     * anesthesia- local with sedation    * Pt does not need pain medication he has Roxycodone 10mg   * PT/OT order was placed   Surgery medication instructions: You will stop eating and drinking at midnight the night before your surgery, but you may continue to take your normal medications with a small sip of water      In the morning on the day of your surgery, we would like you to take the following medications (as long as you have never been told to avoid Tylenol or NSAIDs like ibuprofen, Naproxen, Aleve, Advil, etc):   Ibuprofen 600mg one tablet by mouth   Tylenol 500mg one tablet by mouth    After surgery, we would like you to take Ibuprofen 600mg one tablet by mouth every 6 hours with food (at breakfast, lunch and dinner)  AND Tylenol 500 mg one tablet by mouth every 6 hours  (at breakfast, lunch and dinner) for 5-7 days after your surgery  Please take these medication EVERYDAY after surgery for 5-7 days, and not just as needed  You can take these medications at the same time  Taking these medications after surgery will limit your need for prescription pain medication  We will also prescribe a narcotic pain medication for a limited time after surgery that you can take as needed for moderate or severe pain  Follow Up:  Return for after surgery  To Do Next Visit:  Re-evaluation of current issue      Operative Discussions:  Endoscopic Carpal Tunnel Release: The anatomy and physiology of carpal tunnel syndrome was discussed with the patient today  Increase pressure localized under the transverse carpal ligament can cause pain, numbness, tingling, or dysesthesias within the median nerve distribution as well as feelings of fatigue, clumsiness, or awkwardness  These symptoms typically occur at night and worse in the morning upon waking  Eventually, untreated carpal tunnel syndrome can result in weakness and permanent loss of muscle within the thenar compartment of the hand  Treatment options were discussed with the patient  Conservative treatment includes nocturnal resting splints to keep the nerve in a neutral position, ergonomic changes within the work or home environment, activity modification, and tendon gliding exercises  Vitamin B6 one tablet daily over the counter may helpful to reduce symptoms    Steroid injections within the carpal canal can help a majority of patients, however this is often self-limited in a majority of patients  Surgical intervention to divide the transverse carpal ligament typically results in a long-lasting relief of the patient's complaints, with the recurrence rate of less than 1%  The patient has elected to undergo an endoscopic carpal tunnel release  The single incision technique was discussed with the patient, which results in approximately a two-week recovery time less wound complications  In the postoperative period, light activities are allowed immediately, driving is allowed when narcotic medication has stopped, and the bandages may be removed and incision may get wet after 2 days  Heavy activities (lifting more than approximately 10 pounds) will be allowed after follow up appointment in 1-2 weeks  While night symptoms (waking from sleep, pain, and discomfort in the hands) generally improves rapidly, the numbness and tingling as well as the strength will slowly improve over weeks to months depending on the chronicity and severity of the carpal tunnel syndrome  Pillar pain and scar discomfort were discussed with the patient which are self-limiting conditions  The risks of bleeding and infection from the surgery are less than 1%  Risk of recurrence is approximately 0 5%  The risks of nerve injury or nerve damage or damage to the blood vessels is approximately 1 in 1200  The patient has an understanding of the above mentioned discussion  The risks and benefits of the procedure were explained to the patient, which include, but are not limited to: Bleeding, infection, recurrence, pain, scar, damage to tendons, damage to nerves, and damage to blood vessels, failure to give desired results and complications related to anesthesia  These risks, along with alternative conservative treatment options, and postoperative protocols were voiced back and understood by the patient    All questions were answered to the patient's satisfaction  The patient agrees to comply with a standard postoperative protocol, and is willing to proceed  Education was provided via written and auditory forms  There were no barriers to learning  Written handouts regarding wound care, incision and scar care, and general preoperative information was provided to the patient  Prior to surgery, the patient may be requested to stop all anti-inflammatory medications  Prophylactic aspirin, Plavix, and Coumadin may be allowed to be continued  Medications including vitamin E , ginkgo, &fish oil are requested to be stopped about one week  and Open Carpal Tunnel Release: The anatomy and physiology of carpal tunnel syndrome was discussed with the patient today  Increase pressure localized under the transverse carpal ligament can cause pain, numbness, tingling, or dysesthesias within the median nerve distribution as well as feelings of fatigue, clumsiness, or awkwardness  These symptoms typically occur at night and worse in the morning upon waking  Eventually, untreated carpal tunnel syndrome can result in weakness and permanent loss of muscle within the thenar compartment of the hand  Treatment options were discussed with the patient  Conservative treatment includes nocturnal resting splints to keep the nerve in a neutral position, ergonomic changes within the work or home environment, activity modification, and tendon gliding exercises  Vitamin B6 one tablet daily over the counter may helpful to reduce symptoms  Steroid injections within the carpal canal can help a majority of patients, however this is often self-limited in a majority of patients  Surgical intervention to divide the transverse carpal ligament typically results in a long-lasting relief of the patient's complaints, with the recurrence rate of less than 1%  The patient has elected to undergo an open carpal tunnel release    The palmar incision technique was discussed in the office with the patient today  In the postoperative period, light activities are allowed immediately, driving is allowed when narcotic medication has stopped, and the bandages may be removed and incision may get wet after 2 days  Heavy activities (lifting more than approximately 10 pounds) will be allowed after follow up appointment in 1-2 weeks  While night symptoms (waking from sleep, pain, and discomfort in the hands) generally improves rapidly, the numbness and tingling as well as the strength will slowly improve over weeks to months depending on the chronicity and severity of the carpal tunnel syndrome  Pillar pain and scar discomfort were discussed with the patient which are self-limiting conditions  The risks of bleeding and infection from the surgery are less than 1%  Risk of recurrence is approximately 0 5%  The risks of nerve injury or nerve damage or damage to the blood vessels is approximately 1 in 1200  The patient has an understanding of the above mentioned discussion  The risks and benefits of the procedure were explained to the patient, which include, but are not limited to: Bleeding, infection, recurrence, pain, scar, damage to tendons, damage to nerves, and damage to blood vessels, failure to give desired results and complications related to anesthesia  These risks, along with alternative conservative treatment options, and postoperative protocols were voiced back and understood by the patient  All questions were answered to the patient's satisfaction  The patient agrees to comply with a standard postoperative protocol, and is willing to proceed  Education was provided via written and auditory forms  There were no barriers to learning  Written handouts regarding wound care, incision and scar care, and general preoperative information was provided to the patient  Prior to surgery, the patient may be requested to stop all anti-inflammatory medications    Prophylactic aspirin, Plavix, and Coumadin may be allowed to be continued  Medications including vitamin E , ginkgo, and fish oil are requested to be stopped approximately one week prior to surgery    Hypertensive medications and beta blockers, if taken, s    Scribe Attestation    I,:   Fransisco Manriquez am acting as a scribe while in the presence of the attending physician :        I,:   Tulio Aldana MD personally performed the services described in this documentation    as scribed in my presence :

## 2019-02-15 NOTE — PRE-PROCEDURE INSTRUCTIONS
Pre-Surgery Instructions:   Medication Instructions    allopurinol (ZYLOPRIM) 300 mg tablet Patient was instructed by Physician and understands   atorvastatin (LIPITOR) 80 mg tablet Patient was instructed by Physician and understands   cyclobenzaprine (FLEXERIL) 10 mg tablet Patient was instructed by Physician and understands   losartan-hydrochlorothiazide (HYZAAR) 100-25 MG per tablet Patient was instructed by Physician and understands   metFORMIN (GLUCOPHAGE-XR) 500 mg 24 hr tablet Patient was instructed by Physician and understands   oxyCODONE (ROXICODONE) 10 MG TABS Patient was instructed by Physician and understands   TRULICITY 1 5 CY/5 6BS SOPN Patient was instructed by Physician and understands

## 2019-02-15 NOTE — H&P
CHIEF COMPLAINT:  Chief Complaint   Patient presents with    Left Hand - Follow-up       SUBJECTIVE:  Mary Haney is a 37y o  year old RHD male who presents to the office to review the MRA of his LUE  Pt continues to have numbness and tingling in his left small and ring fingers  Patient also has difficulty adducting his small finger  Patient states that this has been going on for approximately 10 weeks  Patient has difficulty opening and closing buttons and gripping objects  Patient denies injury  The patient denies any cardiac or pulmonary issues  Denies any history of MI, gastric ulcers, kidney or liver issues  Denies blood thinners  Pt is diabetic  Pt has Roxicodone 10 mg which he takes as needed for pain    PAST MEDICAL HISTORY:  Past Medical History:   Diagnosis Date    CPAP (continuous positive airway pressure) dependence     Diabetes ([address] )     HTN (hypertension)     Kidney stone     RIGHT    Morbid obesity with BMI of 40 0-44 9, adult (HCC)     SANCHO on CPAP        PAST SURGICAL HISTORY:  Past Surgical History:   Procedure Laterality Date    MD CYSTO/URETERO W/LITHOTRIPSY &INDWELL STENT INSRT Right 5/1/2017    Procedure: CYSTOSCOPY URETEROSCOPY WITH LITHOTRIPSY HOLMIUM LASER, RETROGRADE PYELOGRAM AND INSERTION STENT URETERAL;  Surgeon: Aislinn Daniel MD;  Location: [facility];  Service: Urology    MD FRAGMENT KIDNEY STONE/ ESWL Left 6/19/2017    Procedure: Brooklynn Eagle EXTRACORPORAL SHOCKWAVE (ESWL);   Surgeon: Aislinn Daniel MD;  Location: [facility];  Service: Urology       FAMILY HISTORY:  Family History   Problem Relation Age of Onset    Bone cancer Father     Prostate cancer Maternal Grandfather     Cancer Family     Hypertension Family        SOCIAL HISTORY:  Social History     Tobacco Use    Smoking status: Former Smoker     Last attempt to quit: 2009     Years since quitting: 10 1    Smokeless tobacco: Never Used    Tobacco comment: Former Smoker as per [employer] Substance Use Topics    Alcohol use:  Yes    Drug use: No       MEDICATIONS:    Current Outpatient Medications:     allopurinol (ZYLOPRIM) 300 mg tablet, Take 1 tablet (300 mg total) by mouth daily, Disp: 90 tablet, Rfl: 0    atorvastatin (LIPITOR) 80 mg tablet, Take 1 tablet (80 mg total) by mouth daily, Disp: 90 tablet, Rfl: 0    Blood Glucose Monitoring Suppl (ONE TOUCH ULTRA 2) w/Device KIT, 1 kit by Does not apply route daily, Disp: , Rfl:     Choline Fenofibrate (FENOFIBRIC ACID) 135 MG CPDR, Take 1 capsule (135 mg total) by mouth daily, Disp: 90 capsule, Rfl: 0    cyclobenzaprine (FLEXERIL) 10 mg tablet, Take 1 tablet (10 mg total) by mouth 3 (three) times a day, Disp: 60 tablet, Rfl: 0    Dulaglutide 1 5 MG/0 5ML SOPN, Inject 0 5 mL (1 5 mg total) under the skin once a week for 90 days, Disp: 12 pen, Rfl: 2    FLUCELVAX 0 5 ML CATRACHO, , Disp: , Rfl:     glucose blood test strip, by In Vitro route Twice daily, Disp: , Rfl:     ibuprofen (MOTRIN) 600 mg tablet, Take 1 tablet (600 mg total) by mouth every 8 (eight) hours, Disp: 90 tablet, Rfl: 1    Icosapent Ethyl (VASCEPA) 1 g CAPS, Take 2 capsules by mouth every 12 (twelve) hours, Disp: , Rfl:     indomethacin (INDOCIN SR) 75 mg CR capsule, Take 75 mg by mouth daily as needed for pain relief, Disp: , Rfl:     losartan-hydrochlorothiazide (HYZAAR) 100-25 MG per tablet, Take 1 tablet by mouth daily, Disp: 90 tablet, Rfl: 0    metFORMIN (GLUCOPHAGE-XR) 500 mg 24 hr tablet, Take 1 tablet (500 mg total) by mouth 2 (two) times a day with meals, Disp: 360 tablet, Rfl: 0    Omega-3 Fatty Acids (FISH OIL DOUBLE STRENGTH) 1200 MG CAPS, Take 1 capsule by mouth 2 (two) times a day, Disp: , Rfl:     ONETOUCH DELICA LANCETS 59F MISC, 1 Units by Does not apply route daily, Disp: , Rfl:     oxyCODONE (ROXICODONE) 10 MG TABS, Take 1 tablet (10 mg total) by mouth every 4 (four) hours as needed for moderate pain Max Daily Amount: 60 mg, Disp: 60 tablet, Rfl: 0    TRULICITY 1 5 LV/4 4YO SOPN, , Disp: , Rfl:     cyclobenzaprine (FLEXERIL) 10 mg tablet, Take 1 tablet (10 mg total) by mouth 3 (three) times a day for 5 days, Disp: 30 tablet, Rfl: 0    ALLERGIES:  No Known Allergies    REVIEW OF SYSTEMS:  Review of Systems   Constitutional: Negative for chills, fever and unexpected weight change  HENT: Negative for hearing loss, nosebleeds and sore throat  Eyes: Negative for pain, redness and visual disturbance  Respiratory: Negative for cough, shortness of breath and wheezing  Cardiovascular: Negative for chest pain, palpitations and leg swelling  Gastrointestinal: Negative for abdominal pain, nausea and vomiting  Endocrine: Negative for polydipsia and polyuria  Genitourinary: Negative for dysuria and hematuria  Musculoskeletal: Negative for arthralgias, joint swelling and myalgias  Skin: Negative for rash and wound  Neurological: Positive for numbness  Negative for light-headedness and headaches  Psychiatric/Behavioral: Negative for decreased concentration, dysphoric mood and suicidal ideas  The patient is not nervous/anxious  VITALS:  Vitals:    02/15/19 0815   BP: 152/84   Pulse: 74       LABS:  HgA1c:   Lab Results   Component Value Date    HGBA1C 6 8 (H) 12/21/2018     BMP:   Lab Results   Component Value Date    GLUCOSE 316 (H) 12/26/2017    CALCIUM 9 5 05/30/2018     (L) 12/26/2017    K 4 3 02/05/2019    CO2 23 02/05/2019     02/05/2019    BUN 16 02/05/2019    CREATININE 1 13 05/30/2018       _____________________________________________________  PHYSICAL EXAMINATION:  General: well developed and well nourished, alert, oriented times 3 and appears comfortable  Psychiatric: Normal  HEENT: Trachea Midline, No torticollis  Cardiac:  Regular rate and rhythm  Pulmonary: No respiratory distress  Lung sounds clear to auscultation      Skin: No masses, erythema, lacerations, fluctation, ulcerations  Neurovascular: Sensation intact to the Median Nerve, Sensation Intact to the Radial Nerve and Pulses Intact    MUSCULOSKELETAL EXAMINATION:  Left Ulnar Nerve Exam:  Negative intrinsic atrophy  Negative deformity at the elbow  Full range of motion with flexion and extension of the elbow  Positive cross finger test in the long and index fingers  FDP strength is 5/5 to the ring finger  FDP strength is 5/5 to the small finger  Intrinsic strength 4/5 Tinel's at [place]'s canal positive     Two point discrimination tested at last visit is 4 mm throughout median nerve distribution 5 mm on the ulnar side of the ring finger and greater than 8 mm on the small finger  Positive rachna's test    Left Carpal Tunnel Exam:    Negative thenar atrophy  Negative phalen's test  Negative carpal tunnel compression  Tinel's at [facility] canal positive Opposition strength 5/5  Abduction strength 5/5           ___________________________________________________  STUDIES REVIEWED:  MRA of the left upper extremity shows chronic scapholunate ligament tear with cyst of the scapholunate interval which slightly alter the course of the ulnar artery, cystic structure noted narrowing the ulnar aspect of the carpal tunnel termination of the superficial arch of the radial artery noted      PROCEDURES PERFORMED:  Procedures  No Procedures performed today    _____________________________________________________  ASSESSMENT/PLAN:    Carpal tunnel left    *surgery- left Endoscopic vs open carpal tunnel release with possible Ganglion cyst excision   * detailed consent was signed in the office today     * anesthesia- local with sedation    * Pt does not need pain medication he has Roxycodone 10mg   * PT/OT order was placed   Surgery medication instructions: You will stop eating and drinking at midnight the night before your surgery, but you may continue to take your normal medications with a small sip of water      In the morning on the day of your surgery, we would like you to take the following medications (as long as you have never been told to avoid Tylenol or NSAIDs like ibuprofen, Naproxen, Aleve, Advil, etc):   Ibuprofen 600mg one tablet by mouth   Tylenol 500mg one tablet by mouth    After surgery, we would like you to take Ibuprofen 600mg one tablet by mouth every 6 hours with food (at breakfast, lunch and dinner)  AND Tylenol 500 mg one tablet by mouth every 6 hours  (at breakfast, lunch and dinner) for 5-7 days after your surgery  Please take these medication EVERYDAY after surgery for 5-7 days, and not just as needed  You can take these medications at the same time  Taking these medications after surgery will limit your need for prescription pain medication  We will also prescribe a narcotic pain medication for a limited time after surgery that you can take as needed for moderate or severe pain  Follow Up:  Return for after surgery  To Do Next Visit:  Re-evaluation of current issue      Operative Discussions:  Endoscopic Carpal Tunnel Release: The anatomy and physiology of carpal tunnel syndrome was discussed with the patient today  Increase pressure localized under the transverse carpal ligament can cause pain, numbness, tingling, or dysesthesias within the median nerve distribution as well as feelings of fatigue, clumsiness, or awkwardness  These symptoms typically occur at night and worse in the morning upon waking  Eventually, untreated carpal tunnel syndrome can result in weakness and permanent loss of muscle within the thenar compartment of the hand  Treatment options were discussed with the patient  Conservative treatment includes nocturnal resting splints to keep the nerve in a neutral position, ergonomic changes within the work or home environment, activity modification, and tendon gliding exercises  Vitamin B6 one tablet daily over the counter may helpful to reduce symptoms    Steroid injections within the carpal canal can help a majority of patients, however this is often self-limited in a majority of patients  Surgical intervention to divide the transverse carpal ligament typically results in a long-lasting relief of the patient's complaints, with the recurrence rate of less than 1%  The patient has elected to undergo an endoscopic carpal tunnel release  The single incision technique was discussed with the patient, which results in approximately a two-week recovery time less wound complications  In the postoperative period, light activities are allowed immediately, driving is allowed when narcotic medication has stopped, and the bandages may be removed and incision may get wet after 2 days  Heavy activities (lifting more than approximately 10 pounds) will be allowed after follow up appointment in 1-2 weeks  While night symptoms (waking from sleep, pain, and discomfort in the hands) generally improves rapidly, the numbness and tingling as well as the strength will slowly improve over weeks to months depending on the chronicity and severity of the carpal tunnel syndrome  Pillar pain and scar discomfort were discussed with the patient which are self-limiting conditions  The risks of bleeding and infection from the surgery are less than 1%  Risk of recurrence is approximately 0 5%  The risks of nerve injury or nerve damage or damage to the blood vessels is approximately 1 in 1200  The patient has an understanding of the above mentioned discussion  The risks and benefits of the procedure were explained to the patient, which include, but are not limited to: Bleeding, infection, recurrence, pain, scar, damage to tendons, damage to nerves, and damage to blood vessels, failure to give desired results and complications related to anesthesia  These risks, along with alternative conservative treatment options, and postoperative protocols were voiced back and understood by the patient    All questions were answered to the patient's satisfaction  The patient agrees to comply with a standard postoperative protocol, and is willing to proceed  Education was provided via written and auditory forms  There were no barriers to learning  Written handouts regarding wound care, incision and scar care, and general preoperative information was provided to the patient  Prior to surgery, the patient may be requested to stop all anti-inflammatory medications  Prophylactic aspirin, Plavix, and Coumadin may be allowed to be continued  Medications including vitamin E , ginkgo, &fish oil are requested to be stopped about one week  and Open Carpal Tunnel Release: The anatomy and physiology of carpal tunnel syndrome was discussed with the patient today  Increase pressure localized under the transverse carpal ligament can cause pain, numbness, tingling, or dysesthesias within the median nerve distribution as well as feelings of fatigue, clumsiness, or awkwardness  These symptoms typically occur at night and worse in the morning upon waking  Eventually, untreated carpal tunnel syndrome can result in weakness and permanent loss of muscle within the thenar compartment of the hand  Treatment options were discussed with the patient  Conservative treatment includes nocturnal resting splints to keep the nerve in a neutral position, ergonomic changes within the work or home environment, activity modification, and tendon gliding exercises  Vitamin B6 one tablet daily over the counter may helpful to reduce symptoms  Steroid injections within the carpal canal can help a majority of patients, however this is often self-limited in a majority of patients  Surgical intervention to divide the transverse carpal ligament typically results in a long-lasting relief of the patient's complaints, with the recurrence rate of less than 1%  The patient has elected to undergo an open carpal tunnel release    The palmar incision technique was discussed in the office with the patient today  In the postoperative period, light activities are allowed immediately, driving is allowed when narcotic medication has stopped, and the bandages may be removed and incision may get wet after 2 days  Heavy activities (lifting more than approximately 10 pounds) will be allowed after follow up appointment in 1-2 weeks  While night symptoms (waking from sleep, pain, and discomfort in the hands) generally improves rapidly, the numbness and tingling as well as the strength will slowly improve over weeks to months depending on the chronicity and severity of the carpal tunnel syndrome  Pillar pain and scar discomfort were discussed with the patient which are self-limiting conditions  The risks of bleeding and infection from the surgery are less than 1%  Risk of recurrence is approximately 0 5%  The risks of nerve injury or nerve damage or damage to the blood vessels is approximately 1 in 1200  The patient has an understanding of the above mentioned discussion  The risks and benefits of the procedure were explained to the patient, which include, but are not limited to: Bleeding, infection, recurrence, pain, scar, damage to tendons, damage to nerves, and damage to blood vessels, failure to give desired results and complications related to anesthesia  These risks, along with alternative conservative treatment options, and postoperative protocols were voiced back and understood by the patient  All questions were answered to the patient's satisfaction  The patient agrees to comply with a standard postoperative protocol, and is willing to proceed  Education was provided via written and auditory forms  There were no barriers to learning  Written handouts regarding wound care, incision and scar care, and general preoperative information was provided to the patient  Prior to surgery, the patient may be requested to stop all anti-inflammatory medications    Prophylactic aspirin, Plavix, and Coumadin may be allowed to be continued  Medications including vitamin E , ginkgo, and fish oil are requested to be stopped approximately one week prior to surgery    Hypertensive medications and beta blockers, if taken, s    Scribe Attestation    I,:   Shon Whitney am acting as a scribe while in the presence of the attending physician :        I,:   Linda De Luna MD personally performed the services described in this documentation    as scribed in my presence :

## 2019-02-15 NOTE — H&P (VIEW-ONLY)
CHIEF COMPLAINT:  Chief Complaint   Patient presents with    Left Hand - Follow-up       SUBJECTIVE:  Janak Carrington is a 37y o  year old RHD male who presents to the office to review the MRA of his LUE  Pt continues to have numbness and tingling in his left small and ring fingers  Patient also has difficulty adducting his small finger  Patient states that this has been going on for approximately 10 weeks  Patient has difficulty opening and closing buttons and gripping objects  Patient denies injury  The patient denies any cardiac or pulmonary issues  Denies any history of MI, gastric ulcers, kidney or liver issues  Denies blood thinners  Pt is diabetic  Pt has Roxicodone 10 mg which he takes as needed for pain    PAST MEDICAL HISTORY:  Past Medical History:   Diagnosis Date    CPAP (continuous positive airway pressure) dependence     Diabetes ([address] )     HTN (hypertension)     Kidney stone     RIGHT    Morbid obesity with BMI of 40 0-44 9, adult (HCC)     SANCHO on CPAP        PAST SURGICAL HISTORY:  Past Surgical History:   Procedure Laterality Date    LA CYSTO/URETERO W/LITHOTRIPSY &INDWELL STENT INSRT Right 5/1/2017    Procedure: CYSTOSCOPY URETEROSCOPY WITH LITHOTRIPSY HOLMIUM LASER, RETROGRADE PYELOGRAM AND INSERTION STENT URETERAL;  Surgeon: Alyson Madden MD;  Location: [facility];  Service: Urology    LA FRAGMENT KIDNEY STONE/ ESWL Left 6/19/2017    Procedure: Chastity Cross EXTRACORPORAL SHOCKWAVE (ESWL);   Surgeon: Alyson Madden MD;  Location: [facility];  Service: Urology       FAMILY HISTORY:  Family History   Problem Relation Age of Onset    Bone cancer Father     Prostate cancer Maternal Grandfather     Cancer Family     Hypertension Family        SOCIAL HISTORY:  Social History     Tobacco Use    Smoking status: Former Smoker     Last attempt to quit: 2009     Years since quitting: 10 1    Smokeless tobacco: Never Used    Tobacco comment: Former Smoker as per [employer] Substance Use Topics    Alcohol use:  Yes    Drug use: No       MEDICATIONS:    Current Outpatient Medications:     allopurinol (ZYLOPRIM) 300 mg tablet, Take 1 tablet (300 mg total) by mouth daily, Disp: 90 tablet, Rfl: 0    atorvastatin (LIPITOR) 80 mg tablet, Take 1 tablet (80 mg total) by mouth daily, Disp: 90 tablet, Rfl: 0    Blood Glucose Monitoring Suppl (ONE TOUCH ULTRA 2) w/Device KIT, 1 kit by Does not apply route daily, Disp: , Rfl:     Choline Fenofibrate (FENOFIBRIC ACID) 135 MG CPDR, Take 1 capsule (135 mg total) by mouth daily, Disp: 90 capsule, Rfl: 0    cyclobenzaprine (FLEXERIL) 10 mg tablet, Take 1 tablet (10 mg total) by mouth 3 (three) times a day, Disp: 60 tablet, Rfl: 0    Dulaglutide 1 5 MG/0 5ML SOPN, Inject 0 5 mL (1 5 mg total) under the skin once a week for 90 days, Disp: 12 pen, Rfl: 2    FLUCELVAX 0 5 ML CATRACHO, , Disp: , Rfl:     glucose blood test strip, by In Vitro route Twice daily, Disp: , Rfl:     ibuprofen (MOTRIN) 600 mg tablet, Take 1 tablet (600 mg total) by mouth every 8 (eight) hours, Disp: 90 tablet, Rfl: 1    Icosapent Ethyl (VASCEPA) 1 g CAPS, Take 2 capsules by mouth every 12 (twelve) hours, Disp: , Rfl:     indomethacin (INDOCIN SR) 75 mg CR capsule, Take 75 mg by mouth daily as needed for pain relief, Disp: , Rfl:     losartan-hydrochlorothiazide (HYZAAR) 100-25 MG per tablet, Take 1 tablet by mouth daily, Disp: 90 tablet, Rfl: 0    metFORMIN (GLUCOPHAGE-XR) 500 mg 24 hr tablet, Take 1 tablet (500 mg total) by mouth 2 (two) times a day with meals, Disp: 360 tablet, Rfl: 0    Omega-3 Fatty Acids (FISH OIL DOUBLE STRENGTH) 1200 MG CAPS, Take 1 capsule by mouth 2 (two) times a day, Disp: , Rfl:     ONETOUCH DELICA LANCETS 07N MISC, 1 Units by Does not apply route daily, Disp: , Rfl:     oxyCODONE (ROXICODONE) 10 MG TABS, Take 1 tablet (10 mg total) by mouth every 4 (four) hours as needed for moderate pain Max Daily Amount: 60 mg, Disp: 60 tablet, Rfl: 0    TRULICITY 1 5 SF/5 2YF SOPN, , Disp: , Rfl:     cyclobenzaprine (FLEXERIL) 10 mg tablet, Take 1 tablet (10 mg total) by mouth 3 (three) times a day for 5 days, Disp: 30 tablet, Rfl: 0    ALLERGIES:  No Known Allergies    REVIEW OF SYSTEMS:  Review of Systems   Constitutional: Negative for chills, fever and unexpected weight change  HENT: Negative for hearing loss, nosebleeds and sore throat  Eyes: Negative for pain, redness and visual disturbance  Respiratory: Negative for cough, shortness of breath and wheezing  Cardiovascular: Negative for chest pain, palpitations and leg swelling  Gastrointestinal: Negative for abdominal pain, nausea and vomiting  Endocrine: Negative for polydipsia and polyuria  Genitourinary: Negative for dysuria and hematuria  Musculoskeletal: Negative for arthralgias, joint swelling and myalgias  Skin: Negative for rash and wound  Neurological: Positive for numbness  Negative for light-headedness and headaches  Psychiatric/Behavioral: Negative for decreased concentration, dysphoric mood and suicidal ideas  The patient is not nervous/anxious  VITALS:  Vitals:    02/15/19 0815   BP: 152/84   Pulse: 74       LABS:  HgA1c:   Lab Results   Component Value Date    HGBA1C 6 8 (H) 12/21/2018     BMP:   Lab Results   Component Value Date    GLUCOSE 316 (H) 12/26/2017    CALCIUM 9 5 05/30/2018     (L) 12/26/2017    K 4 3 02/05/2019    CO2 23 02/05/2019     02/05/2019    BUN 16 02/05/2019    CREATININE 1 13 05/30/2018       _____________________________________________________  PHYSICAL EXAMINATION:  General: well developed and well nourished, alert, oriented times 3 and appears comfortable  Psychiatric: Normal  HEENT: Trachea Midline, No torticollis  Cardiac:  Regular rate and rhythm  Pulmonary: No respiratory distress  Lung sounds clear to auscultation      Skin: No masses, erythema, lacerations, fluctation, ulcerations  Neurovascular: Sensation intact to the Median Nerve, Sensation Intact to the Radial Nerve and Pulses Intact    MUSCULOSKELETAL EXAMINATION:  Left Ulnar Nerve Exam:  Negative intrinsic atrophy  Negative deformity at the elbow  Full range of motion with flexion and extension of the elbow  Positive cross finger test in the long and index fingers  FDP strength is 5/5 to the ring finger  FDP strength is 5/5 to the small finger  Intrinsic strength 4/5 Tinel's at Guyon's canal positive     Two point discrimination tested at last visit is 4 mm throughout median nerve distribution 5 mm on the ulnar side of the ring finger and greater than 8 mm on the small finger  Positive rachna's test    Left Carpal Tunnel Exam:    Negative thenar atrophy  Negative phalen's test  Negative carpal tunnel compression  Tinel's at [facility] canal positive Opposition strength 5/5  Abduction strength 5/5           ___________________________________________________  STUDIES REVIEWED:  MRA of the left upper extremity shows chronic scapholunate ligament tear with cyst of the scapholunate interval which slightly alter the course of the ulnar artery, cystic structure noted narrowing the ulnar aspect of the carpal tunnel termination of the superficial arch of the radial artery noted      PROCEDURES PERFORMED:  Procedures  No Procedures performed today    _____________________________________________________  ASSESSMENT/PLAN:    Carpal tunnel left    *surgery- left Endoscopic vs open carpal tunnel release with possible Ganglion cyst excision   * detailed consent was signed in the office today     * anesthesia- local with sedation    * Pt does not need pain medication he has Roxycodone 10mg   * PT/OT order was placed   Surgery medication instructions: You will stop eating and drinking at midnight the night before your surgery, but you may continue to take your normal medications with a small sip of water      In the morning on the day of your surgery, we would like you to take the following medications (as long as you have never been told to avoid Tylenol or NSAIDs like ibuprofen, Naproxen, Aleve, Advil, etc):   Ibuprofen 600mg one tablet by mouth   Tylenol 500mg one tablet by mouth    After surgery, we would like you to take Ibuprofen 600mg one tablet by mouth every 6 hours with food (at breakfast, lunch and dinner)  AND Tylenol 500 mg one tablet by mouth every 6 hours  (at breakfast, lunch and dinner) for 5-7 days after your surgery  Please take these medication EVERYDAY after surgery for 5-7 days, and not just as needed  You can take these medications at the same time  Taking these medications after surgery will limit your need for prescription pain medication  We will also prescribe a narcotic pain medication for a limited time after surgery that you can take as needed for moderate or severe pain  Follow Up:  Return for after surgery  To Do Next Visit:  Re-evaluation of current issue      Operative Discussions:  Endoscopic Carpal Tunnel Release: The anatomy and physiology of carpal tunnel syndrome was discussed with the patient today  Increase pressure localized under the transverse carpal ligament can cause pain, numbness, tingling, or dysesthesias within the median nerve distribution as well as feelings of fatigue, clumsiness, or awkwardness  These symptoms typically occur at night and worse in the morning upon waking  Eventually, untreated carpal tunnel syndrome can result in weakness and permanent loss of muscle within the thenar compartment of the hand  Treatment options were discussed with the patient  Conservative treatment includes nocturnal resting splints to keep the nerve in a neutral position, ergonomic changes within the work or home environment, activity modification, and tendon gliding exercises  Vitamin B6 one tablet daily over the counter may helpful to reduce symptoms    Steroid injections within the carpal canal can help a majority of patients, however this is often self-limited in a majority of patients  Surgical intervention to divide the transverse carpal ligament typically results in a long-lasting relief of the patient's complaints, with the recurrence rate of less than 1%  The patient has elected to undergo an endoscopic carpal tunnel release  The single incision technique was discussed with the patient, which results in approximately a two-week recovery time less wound complications  In the postoperative period, light activities are allowed immediately, driving is allowed when narcotic medication has stopped, and the bandages may be removed and incision may get wet after 2 days  Heavy activities (lifting more than approximately 10 pounds) will be allowed after follow up appointment in 1-2 weeks  While night symptoms (waking from sleep, pain, and discomfort in the hands) generally improves rapidly, the numbness and tingling as well as the strength will slowly improve over weeks to months depending on the chronicity and severity of the carpal tunnel syndrome  Pillar pain and scar discomfort were discussed with the patient which are self-limiting conditions  The risks of bleeding and infection from the surgery are less than 1%  Risk of recurrence is approximately 0 5%  The risks of nerve injury or nerve damage or damage to the blood vessels is approximately 1 in 1200  The patient has an understanding of the above mentioned discussion  The risks and benefits of the procedure were explained to the patient, which include, but are not limited to: Bleeding, infection, recurrence, pain, scar, damage to tendons, damage to nerves, and damage to blood vessels, failure to give desired results and complications related to anesthesia  These risks, along with alternative conservative treatment options, and postoperative protocols were voiced back and understood by the patient    All questions were answered to the patient's satisfaction  The patient agrees to comply with a standard postoperative protocol, and is willing to proceed  Education was provided via written and auditory forms  There were no barriers to learning  Written handouts regarding wound care, incision and scar care, and general preoperative information was provided to the patient  Prior to surgery, the patient may be requested to stop all anti-inflammatory medications  Prophylactic aspirin, Plavix, and Coumadin may be allowed to be continued  Medications including vitamin E , ginkgo, &fish oil are requested to be stopped about one week  and Open Carpal Tunnel Release: The anatomy and physiology of carpal tunnel syndrome was discussed with the patient today  Increase pressure localized under the transverse carpal ligament can cause pain, numbness, tingling, or dysesthesias within the median nerve distribution as well as feelings of fatigue, clumsiness, or awkwardness  These symptoms typically occur at night and worse in the morning upon waking  Eventually, untreated carpal tunnel syndrome can result in weakness and permanent loss of muscle within the thenar compartment of the hand  Treatment options were discussed with the patient  Conservative treatment includes nocturnal resting splints to keep the nerve in a neutral position, ergonomic changes within the work or home environment, activity modification, and tendon gliding exercises  Vitamin B6 one tablet daily over the counter may helpful to reduce symptoms  Steroid injections within the carpal canal can help a majority of patients, however this is often self-limited in a majority of patients  Surgical intervention to divide the transverse carpal ligament typically results in a long-lasting relief of the patient's complaints, with the recurrence rate of less than 1%  The patient has elected to undergo an open carpal tunnel release    The palmar incision technique was discussed in the office with the patient today  In the postoperative period, light activities are allowed immediately, driving is allowed when narcotic medication has stopped, and the bandages may be removed and incision may get wet after 2 days  Heavy activities (lifting more than approximately 10 pounds) will be allowed after follow up appointment in 1-2 weeks  While night symptoms (waking from sleep, pain, and discomfort in the hands) generally improves rapidly, the numbness and tingling as well as the strength will slowly improve over weeks to months depending on the chronicity and severity of the carpal tunnel syndrome  Pillar pain and scar discomfort were discussed with the patient which are self-limiting conditions  The risks of bleeding and infection from the surgery are less than 1%  Risk of recurrence is approximately 0 5%  The risks of nerve injury or nerve damage or damage to the blood vessels is approximately 1 in 1200  The patient has an understanding of the above mentioned discussion  The risks and benefits of the procedure were explained to the patient, which include, but are not limited to: Bleeding, infection, recurrence, pain, scar, damage to tendons, damage to nerves, and damage to blood vessels, failure to give desired results and complications related to anesthesia  These risks, along with alternative conservative treatment options, and postoperative protocols were voiced back and understood by the patient  All questions were answered to the patient's satisfaction  The patient agrees to comply with a standard postoperative protocol, and is willing to proceed  Education was provided via written and auditory forms  There were no barriers to learning  Written handouts regarding wound care, incision and scar care, and general preoperative information was provided to the patient  Prior to surgery, the patient may be requested to stop all anti-inflammatory medications    Prophylactic aspirin, Plavix, and Coumadin may be allowed to be continued  Medications including vitamin E , ginkgo, and fish oil are requested to be stopped approximately one week prior to surgery    Hypertensive medications and beta blockers, if taken, s    Scribe Attestation    I,:   Peewee James am acting as a scribe while in the presence of the attending physician :        I,:   Robbin Milton MD personally performed the services described in this documentation    as scribed in my presence :

## 2019-02-19 ENCOUNTER — ANESTHESIA EVENT (OUTPATIENT)
Dept: PERIOP | Facility: HOSPITAL | Age: 44
End: 2019-02-19
Payer: COMMERCIAL

## 2019-02-19 ENCOUNTER — ANESTHESIA (OUTPATIENT)
Dept: PERIOP | Facility: HOSPITAL | Age: 44
End: 2019-02-19
Payer: COMMERCIAL

## 2019-02-19 ENCOUNTER — HOSPITAL ENCOUNTER (OUTPATIENT)
Facility: HOSPITAL | Age: 44
Setting detail: OUTPATIENT SURGERY
Discharge: HOME | End: 2019-02-19
Attending: ORTHOPAEDIC SURGERY | Admitting: ORTHOPAEDIC SURGERY
Payer: COMMERCIAL

## 2019-02-19 VITALS
OXYGEN SATURATION: 95 % | WEIGHT: 315 LBS | HEART RATE: 77 BPM | RESPIRATION RATE: 10 BRPM | SYSTOLIC BLOOD PRESSURE: 161 MMHG | BODY MASS INDEX: 40.43 KG/M2 | TEMPERATURE: 98.9 F | DIASTOLIC BLOOD PRESSURE: 70 MMHG | HEIGHT: 74 IN

## 2019-02-19 LAB
GLUCOSE SERPL-MCNC: 125 MG/DL (ref 65–140)
GLUCOSE SERPL-MCNC: 89 MG/DL (ref 65–140)

## 2019-02-19 PROCEDURE — 29848 WRIST ENDOSCOPY/SURGERY: CPT | Performed by: ORTHOPAEDIC SURGERY

## 2019-02-19 PROCEDURE — 82948 REAGENT STRIP/BLOOD GLUCOSE: CPT

## 2019-02-19 RX ORDER — PROPOFOL 10 MG/ML
INJECTION, EMULSION INTRAVENOUS AS NEEDED
Status: DISCONTINUED | OUTPATIENT
Start: 2019-02-19 | End: 2019-02-19 | Stop reason: SURG

## 2019-02-19 RX ORDER — IBUPROFEN 600 MG/1
600 TABLET ORAL ONCE
Status: COMPLETED | OUTPATIENT
Start: 2019-02-19 | End: 2019-02-19

## 2019-02-19 RX ORDER — LIDOCAINE HYDROCHLORIDE 10 MG/ML
INJECTION, SOLUTION INFILTRATION; PERINEURAL AS NEEDED
Status: DISCONTINUED | OUTPATIENT
Start: 2019-02-19 | End: 2019-02-19 | Stop reason: SURG

## 2019-02-19 RX ORDER — KETAMINE HYDROCHLORIDE 50 MG/ML
INJECTION, SOLUTION, CONCENTRATE INTRAMUSCULAR; INTRAVENOUS AS NEEDED
Status: DISCONTINUED | OUTPATIENT
Start: 2019-02-19 | End: 2019-02-19 | Stop reason: SURG

## 2019-02-19 RX ORDER — FENTANYL CITRATE 50 UG/ML
INJECTION, SOLUTION INTRAMUSCULAR; INTRAVENOUS AS NEEDED
Status: DISCONTINUED | OUTPATIENT
Start: 2019-02-19 | End: 2019-02-19 | Stop reason: SURG

## 2019-02-19 RX ORDER — MIDAZOLAM HYDROCHLORIDE 1 MG/ML
INJECTION INTRAMUSCULAR; INTRAVENOUS AS NEEDED
Status: DISCONTINUED | OUTPATIENT
Start: 2019-02-19 | End: 2019-02-19 | Stop reason: SURG

## 2019-02-19 RX ORDER — MAGNESIUM HYDROXIDE 1200 MG/15ML
LIQUID ORAL AS NEEDED
Status: DISCONTINUED | OUTPATIENT
Start: 2019-02-19 | End: 2019-02-19 | Stop reason: HOSPADM

## 2019-02-19 RX ORDER — PROPOFOL 10 MG/ML
INJECTION, EMULSION INTRAVENOUS CONTINUOUS PRN
Status: DISCONTINUED | OUTPATIENT
Start: 2019-02-19 | End: 2019-02-19 | Stop reason: SURG

## 2019-02-19 RX ORDER — ONDANSETRON 2 MG/ML
INJECTION INTRAMUSCULAR; INTRAVENOUS AS NEEDED
Status: DISCONTINUED | OUTPATIENT
Start: 2019-02-19 | End: 2019-02-19 | Stop reason: SURG

## 2019-02-19 RX ORDER — SODIUM CHLORIDE, SODIUM LACTATE, POTASSIUM CHLORIDE, CALCIUM CHLORIDE 600; 310; 30; 20 MG/100ML; MG/100ML; MG/100ML; MG/100ML
50 INJECTION, SOLUTION INTRAVENOUS CONTINUOUS
Status: DISCONTINUED | OUTPATIENT
Start: 2019-02-19 | End: 2019-02-19 | Stop reason: HOSPADM

## 2019-02-19 RX ORDER — FENTANYL CITRATE/PF 50 MCG/ML
50 SYRINGE (ML) INJECTION
Status: DISCONTINUED | OUTPATIENT
Start: 2019-02-19 | End: 2019-02-19 | Stop reason: HOSPADM

## 2019-02-19 RX ORDER — SODIUM CHLORIDE, SODIUM LACTATE, POTASSIUM CHLORIDE, CALCIUM CHLORIDE 600; 310; 30; 20 MG/100ML; MG/100ML; MG/100ML; MG/100ML
INJECTION, SOLUTION INTRAVENOUS CONTINUOUS PRN
Status: DISCONTINUED | OUTPATIENT
Start: 2019-02-19 | End: 2019-02-19 | Stop reason: SURG

## 2019-02-19 RX ADMIN — KETAMINE HYDROCHLORIDE 20 MG: 50 INJECTION INTRAMUSCULAR; INTRAVENOUS at 12:41

## 2019-02-19 RX ADMIN — ONDANSETRON 4 MG: 2 INJECTION INTRAMUSCULAR; INTRAVENOUS at 12:25

## 2019-02-19 RX ADMIN — IBUPROFEN 600 MG: 600 TABLET ORAL at 13:49

## 2019-02-19 RX ADMIN — SODIUM CHLORIDE, SODIUM LACTATE, POTASSIUM CHLORIDE, AND CALCIUM CHLORIDE 50 ML/HR: .6; .31; .03; .02 INJECTION, SOLUTION INTRAVENOUS at 11:43

## 2019-02-19 RX ADMIN — MIDAZOLAM HYDROCHLORIDE 2 MG: 1 INJECTION, SOLUTION INTRAMUSCULAR; INTRAVENOUS at 12:22

## 2019-02-19 RX ADMIN — SODIUM CHLORIDE, SODIUM LACTATE, POTASSIUM CHLORIDE, AND CALCIUM CHLORIDE: .6; .31; .03; .02 INJECTION, SOLUTION INTRAVENOUS at 12:22

## 2019-02-19 RX ADMIN — FENTANYL CITRATE 25 MCG: 50 INJECTION, SOLUTION INTRAMUSCULAR; INTRAVENOUS at 12:40

## 2019-02-19 RX ADMIN — KETAMINE HYDROCHLORIDE 30 MG: 50 INJECTION INTRAMUSCULAR; INTRAVENOUS at 12:29

## 2019-02-19 RX ADMIN — FENTANYL CITRATE 25 MCG: 50 INJECTION, SOLUTION INTRAMUSCULAR; INTRAVENOUS at 12:33

## 2019-02-19 RX ADMIN — PROPOFOL 120 MG: 10 INJECTION, EMULSION INTRAVENOUS at 12:29

## 2019-02-19 RX ADMIN — LIDOCAINE HYDROCHLORIDE 50 MG: 10 INJECTION, SOLUTION INFILTRATION; PERINEURAL at 12:29

## 2019-02-19 RX ADMIN — PROPOFOL 100 MCG/KG/MIN: 10 INJECTION, EMULSION INTRAVENOUS at 12:32

## 2019-02-19 NOTE — PROGRESS NOTES
Pt states he cannot take Acetaminophen due to gout flare up  Is requesting a pain medication prior to discharge  Due to pt having Roxicodone ordered from a different provider, PA cannot give any narcotic  Order for Ibuprofen received verbally  Will instruct patient to call the office if anti-inflammatory med does not control the pain of the procedure

## 2019-02-19 NOTE — ANESTHESIA PREPROCEDURE EVALUATION
Review of Systems/Medical History  Patient summary reviewed  Chart reviewed      Cardiovascular  Exercise tolerance (METS): >4,  Hyperlipidemia, Hypertension ,    Pulmonary  Sleep apnea CPAP and Sleep Study completed,        GI/Hepatic    Liver disease ,        Kidney stones,        Endo/Other  Diabetes ,   Obesity    GYN  Negative gynecology ROS          Hematology  Negative hematology ROS      Musculoskeletal    Arthritis     Neurology  Negative neurology ROS      Psychology   Negative psychology ROS              Physical Exam    Airway    Mallampati score: III         Dental       Cardiovascular  Rhythm: regular, Rate: normal,     Pulmonary  Pulmonary exam normal     Other Findings        Anesthesia Plan  ASA Score- 3     Anesthesia Type- IV sedation with anesthesia with ASA Monitors  Additional Monitors:   Airway Plan:         Plan Factors-Patient not instructed to abstain from smoking on day of procedure  Patient did not smoke on day of surgery  Induction- intravenous  Postoperative Plan-     Informed Consent- Anesthetic plan and risks discussed with patient  I personally reviewed this patient with the CRNA  Discussed and agreed on the Anesthesia Plan with the CRNA  Jenifer Rivas

## 2019-02-19 NOTE — OP NOTE
OPERATIVE REPORT    PATIENT NAME: Seema Coppola    MEDICAL RECORD NO:  [id number]    PROCEDURE DATE:  19    :  1975    SURGEON:  DE Smith , Ph D     Clayton Vega: none    PREOPERATIVE DIAGNOSIS:  left carpal tunnel syndrome, ulnar neuritis at wrist      POSTOPERATIVE DIAGNOSIS: left carpal tunnel syndrome, ulnar neuritis at wrist    PROCEDURE PERFORMED: left endoscopic carpal tunnel release with exploration for volar ganglion cyst     ANESTHESIA:  conscious sedation and local    COMPLICATIONS:  None    TOURNIQUET TIME: 10 minutes at 250 mmHg on the left    DISPOSITION: Patient was sent to the PACU in stable condition  INDICATION:  The patient is an 37 y o  male with clinical and/or electrodiagnostic evidence of left carpal tunnel syndrome and left ulnar neuritis at the level of the wrist   And MRA of the left wrist revealed a small ganglion cyst at the level of Guyon's canal and carpal canal   The patient had failed non-operative management and opted for carpal tunnel release  After informing the patient of the risks and benefits of endoscopic carpal tunnel release and volar ganglion cyst exploration and excision, consent was obtained for surgical intervention  PROCEDURE: The patient was identified in the preoperative screening area  The consent form was signed and verified after identifying the correct operative site  The patient was taken to the operating room and underwent conscious sedation and local   The left upper extremity was then prepped and draped in normal sterile fashion with Chlorhexidine solution  The left arm was then elevated, exsanguinated with an Esmarch and the tourniquet placed about the brachium was insufflated to 250 mmHg  The Esmarch was removed  A transverse incision, approximately 1 cm in length, was made just proximal to the distal wrist crease and just ulnar to the median nerve    The subcutaneous tissue was dissected bluntly down to the level of the forearm fascia  A transverse split in the fascia was created by gently piercing the fascia with the tips of tenotomy scissors  The proximal portion of the fascia was released longitudinally into the forearm using tenotomy scissors  The distal fascia was left intact for insertion of dilators  The carpal canal was then dilated using sequentially increasing sized dilators  Once the canal was adequately dilated, the scope and canula tray were then introduced into the carpal canal  The transverse carpal ligament was covered with a thick layer of synovial tissue on its undersurface  The synovial layer was debrided to expose the transverse fibers and the distal edge of the ligament  Once clear visualization of the transverse carpal ligament was obtained, the knife blade was introduced into the canula tray and the ligament was then released from distal to proximal maintaining complete visualization throughout the procedure  Several passes of the knife blade were necessary in some areas to complete the release  Complete release was verified with the endoscopic camera in place clearly visualizing the  cut edges of the transverse carpal ligament with the overlying volar fatty tissue and palmaris brevis muscle fibers protruding through  The transverse carpal ligament was moderately thickended  There is no evidence of a ganglion cyst as visualized from within the carpal canal and through the scope  The scope and cannula were then removed and the wound was irrigated with copious amounts of saline solution  The transverse incision over the volar wrist was extended ulnarly for total of a 1 inch incision over the volar wrist   The soft tissue was dissected down to expose the ulnar neurovascular bundle  Once the bundle was identified the bundle was then traced out distally to the level of the ganglion cyst as visualized by the MRA    There was no ganglion cyst identified as dissected up to the level of the pisiform where the cyst was identified by imaging  The ulnar neurovascular bundle was without compression and was freely mobile  Following the procedure the small finger was evaluated for abduction for which the patient was complaining, there was no abduction of the digit noted  The tourniquet was released at 10 minutes with good capillary refill and color in the digits  The small incision was then repaired using #4-0 nylon sutures placed in an interrupted horizontal mattress fashion  The patient tolerated the procedure well  The incision was dressed in a sterile soft bandage  There were no complications during the case  The patient was sent to the PACU in stable condition            Elayne Ferraro MD  02/19/19  12:46 PM

## 2019-02-19 NOTE — ANESTHESIA POSTPROCEDURE EVALUATION
Post-Op Assessment Note    CV Status:  Stable  Pain Score: 0    Pain management: adequate     Mental Status:  Alert and awake   Hydration Status:  Euvolemic   PONV Controlled:  Controlled   Airway Patency:  Patent   Post Op Vitals Reviewed: Yes      Staff: CRNA           BP   161/71   Temp   98 8   Pulse  85   Resp   16   SpO2 98

## 2019-02-19 NOTE — DISCHARGE INSTRUCTIONS
Post Operative Instructions    You have had surgery on your arm today, please read and follow the information below:  · Elevate your hand above your elbow during the next 24-48 hours to help with swelling  · Place your hand and arm over your head with motion at your shoulder three times a day  · Do not apply any cream/ointment/oil to your incisions including antibiotics  · Do not soak your hands in standing water (dishwater, tubs, Jacuzzi's, pools, etc ) until given permission (typically 2-3 weeks after injury)    Call the office at [phone]  if you notice any:  · Increased numbness or tingling of your hand or fingers that is not relieved with elevation  · Increasing pain that is not controlled with medication  · Difficulty chewing, breathing, swallowing  · Chest pains or shortness of breath  · Fever over 101 4 degrees  Bandage: Your therapist will remove your bandage at your first therapy appointment  Motion: Move fingers into a fist 5 times a day, DO NOT move any splinted fingers  Weight bearing status: Avoid heavy lifting (>5 pounds) with the extremity that was operated on until follow up appointment  Normal activities of daily living are OK  Ice: Ice for 10 minutes every hour as needed for swelling x 24 hours  Sling: No sling necessary  Pain medication: You receive chronic pain medication from another provider (Roxicodone 10mg) and do not need any additional prescription pain medication  After surgery, we would like you to take Ibuprofen 600mg one tablet by mouth every 6 hours with food (at breakfast, lunch and dinner)  AND Tylenol 500 mg one tablet by mouth every 6 hours  (at breakfast, lunch and dinner) for 5-7 days after your surgery  Please take these medication EVERYDAY after surgery for 5-7 days, and not just as needed  You can take these medications at the same time  Taking these medications after surgery will limit your need for prescription pain medication  Follow-up Appointment: 7-10 days with Dr Wells Sandifer  Occupational Therapy:   2/21/2019 8:00 AM [facility]       If you need help scheduling Therapy, you can call [phone]        Please call the office at [phone] if you have any questions or concerns regarding your post-operative care

## 2019-02-21 ENCOUNTER — EVALUATION (OUTPATIENT)
Dept: OCCUPATIONAL THERAPY | Facility: CLINIC | Age: 44
End: 2019-02-21
Payer: COMMERCIAL

## 2019-02-21 DIAGNOSIS — G56.02 CARPAL TUNNEL SYNDROME ON LEFT: Primary | ICD-10-CM

## 2019-02-21 PROCEDURE — 97110 THERAPEUTIC EXERCISES: CPT

## 2019-02-21 PROCEDURE — 97166 OT EVAL MOD COMPLEX 45 MIN: CPT

## 2019-02-21 NOTE — PROGRESS NOTES
OT Evaluation     Today's date: 2019  Patient name: Anahi Kurtz  : 1975  MRN: [id number]  Referring provider: Deejay Musa MD  Dx:   Encounter Diagnosis     ICD-10-CM    1  Carpal tunnel syndrome on left G56 02                   Assessment  Assessment details: Anahi Kurtz is a 37 y o  male with a diagnosis of left carpal tunnel syndrome s/p release on 19  A moderate complexity evaluation was completed today  FOTO score is 38% as he is having difficulty carrying shopping bag, buttoning, pushing up on hands, and cleaning  Findings show an impairment with ROM, strength, activity tolerance, and pain in left hand  Patient will benefit from OT services to reach goals  Impairments: abnormal or restricted ROM, activity intolerance, impaired physical strength, lacks appropriate home exercise program and pain with function    Symptom irritability: lowUnderstanding of Dx/Px/POC: good   Prognosis: good    Goals  GOALS:  1  Patient will be independent with HEP  MET  2  Patient will demonstrate understanding of caring for incision and applying appropriate bandages on incision area  MET      Plan  Plan details: Patient was instructed in care of incision and dressing changes  Also, exercises were reviewed and patient had good carryover with them  Patient would like to be discharged from therapy  He feels as if he can perform ex  Independently at home  Patient would benefit from: OT eval and skilled occupational therapy  Planned modality interventions: cryotherapy  Planned therapy interventions: therapeutic exercise and home exercise program  Plan of Care beginning date: 2019  Plan of Care expiration date: 2019  Treatment plan discussed with: patient        Subjective Evaluation    History of Present Illness  Date of onset: 12/15/2018  Mechanism of injury: Patient was experiencing pain and numbness in right dominant hand for approximately 2 months  An EMG test was done recently which indicated CTS  Patient consulted Dr Oumou Rocha and elected to have surgery on 19  Quality of life: good    Pain  Current pain ratin  At best pain ratin  At worst pain ratin  Quality: dull ache  Aggravating factors: nothing    Social Support    Employment status: not working  Hand dominance: right      Diagnostic Tests  EMG: abnormal  Treatments  Current treatment: occupational therapy        Objective     Active Range of Motion     Left Wrist   Wrist flexion: 70 degrees   Wrist extension: 34 degrees   Radial deviation: 15 degrees   Ulnar deviation: 30 degrees     Right Wrist   Wrist flexion: 75 degrees   Wrist extension: 40 degrees   Radial deviation: 22 degrees   Ulnar deviation: 37 degrees     Left Thumb   Flexion     MP: 36 degrees    DIP: 48 degrees  Radial abduction    CMC: 80 degrees    Right Thumb   Flexion     MP: 50    DIP: 64    Left Digits    Flexion   Index     MCP: 60    PIP: 75    Right Digits   Flexion   Index     MCP: 70    PIP: 90    Strength/Myotome Testing     Left Wrist/Hand      (2nd hand position)     Trial 1: 5    Right Wrist/Hand      (2nd hand position)     Trial 1: 85    Trial 2: 90          Precautions See protocol    Specialty Daily Treatment Diary   Next appt  : 3/1/19  Manual                                                     Exercise Diary  19       AROM w/ mild stretch  PIP, DIP, MCP,   Reviewed  3x each       AROM left wrist flex/ext, UD/RD 5  5       Thumb MP/IP flex 5/5       Thumb ABD 5                                                                                                                                           Modalities                                  Discharge OT services as patient is independent with HEP

## 2019-02-25 ENCOUNTER — TRANSCRIBE ORDERS (OUTPATIENT)
Dept: PHYSICAL THERAPY | Facility: CLINIC | Age: 44
End: 2019-02-25

## 2019-02-26 ENCOUNTER — DOCUMENTATION (OUTPATIENT)
Dept: HEMATOLOGY ONCOLOGY | Facility: CLINIC | Age: 44
End: 2019-02-26

## 2019-02-26 ENCOUNTER — OFFICE VISIT (OUTPATIENT)
Dept: HEMATOLOGY ONCOLOGY | Facility: CLINIC | Age: 44
End: 2019-02-26
Payer: COMMERCIAL

## 2019-02-26 ENCOUNTER — HOSPITAL ENCOUNTER (EMERGENCY)
Facility: HOSPITAL | Age: 44
Discharge: HOME | End: 2019-02-26
Attending: EMERGENCY MEDICINE | Admitting: EMERGENCY MEDICINE
Payer: COMMERCIAL

## 2019-02-26 VITALS
RESPIRATION RATE: 18 BRPM | DIASTOLIC BLOOD PRESSURE: 94 MMHG | TEMPERATURE: 97.5 F | SYSTOLIC BLOOD PRESSURE: 142 MMHG | HEIGHT: 74 IN | HEART RATE: 71 BPM | OXYGEN SATURATION: 98 % | BODY MASS INDEX: 40.43 KG/M2 | WEIGHT: 315 LBS

## 2019-02-26 VITALS
OXYGEN SATURATION: 98 % | HEART RATE: 71 BPM | RESPIRATION RATE: 16 BRPM | BODY MASS INDEX: 40.43 KG/M2 | TEMPERATURE: 96.9 F | WEIGHT: 315 LBS | SYSTOLIC BLOOD PRESSURE: 158 MMHG | DIASTOLIC BLOOD PRESSURE: 74 MMHG | HEIGHT: 74 IN

## 2019-02-26 DIAGNOSIS — R71.8 MICROCYTOSIS: Primary | ICD-10-CM

## 2019-02-26 DIAGNOSIS — M79.89 SWELLING OF LEFT HAND: Primary | ICD-10-CM

## 2019-02-26 PROCEDURE — 99283 EMERGENCY DEPT VISIT LOW MDM: CPT

## 2019-02-26 PROCEDURE — 99213 OFFICE O/P EST LOW 20 MIN: CPT | Performed by: INTERNAL MEDICINE

## 2019-02-26 RX ORDER — SULFAMETHOXAZOLE AND TRIMETHOPRIM 800; 160 MG/1; MG/1
1 TABLET ORAL 2 TIMES DAILY
Qty: 14 TABLET | Refills: 0 | Status: SHIPPED | OUTPATIENT
Start: 2019-02-26 | End: 2019-03-05

## 2019-02-26 RX ORDER — SULFAMETHOXAZOLE AND TRIMETHOPRIM 800; 160 MG/1; MG/1
1 TABLET ORAL ONCE
Status: COMPLETED | OUTPATIENT
Start: 2019-02-26 | End: 2019-02-26

## 2019-02-26 RX ADMIN — SULFAMETHOXAZOLE AND TRIMETHOPRIM 1 TABLET: 800; 160 TABLET ORAL at 09:45

## 2019-02-26 NOTE — PROGRESS NOTES
Hematology/Oncology Outpatient Follow-up  Janak Carrington 37 y o  male 1975 [id number]    Date:  2/26/2019    Assessment and Plan:  1  Microcytosis  His mild microcytosis is most likely related to inflammatory process with mildly elevated sed rate without any hint of iron deficiency or lead poisoning  The patient was educated about weight loss and close observation by his primary care physician with blood work on at least every 6 months basis  We will be more than happy to see him in the future on as-needed basis  HPI:  The patient came in today for a follow-up visit  He has multiple comorbid conditions including morbid obesity, gout, hepatosplenomegaly  He was sent to us for further evaluation of his microcytic red cells  Interval history: The Patient had extensive workup for his microcytic red cells on the 5th of February which showed hemoglobin of 13 5 and MCV of 76 otherwise normal white cells and platelets  His iron panel and lead level are within normal range  His C-reactive protein is normal with mildly elevated sed rate  The serum protein electrophoresis to was negative for M spike with normal immunoglobulin levels  The patient stated that he recently had carpal tunnel surgery of the left hand which is causing significant pain in swelling of the left hand  ROS: Review of Systems   Constitutional: Negative for appetite change, diaphoresis, fatigue and fever  HENT: Negative for congestion, dental problem, facial swelling, hearing loss, tinnitus and trouble swallowing  Eyes: Negative for visual disturbance  Respiratory: Negative for cough, chest tightness, shortness of breath and wheezing  Cardiovascular: Negative for chest pain and leg swelling  Gastrointestinal: Negative for abdominal distention, abdominal pain, blood in stool, constipation, diarrhea, nausea and vomiting  Genitourinary: Negative for dysuria, hematuria and urgency     Musculoskeletal: Negative for arthralgias, myalgias and neck pain  Left wrist pain due to recent surgery   Skin: Negative  Negative for color change, pallor, rash and wound  Neurological: Positive for numbness  Negative for dizziness, weakness and headaches  Hematological: Negative for adenopathy  Psychiatric/Behavioral: Negative for agitation, behavioral problems, confusion, hallucinations, self-injury and sleep disturbance  The patient is not nervous/anxious and is not hyperactive  Past Medical History:   Diagnosis Date    CPAP (continuous positive airway pressure) dependence     Diabetes ([address] )     HTN (hypertension)     Hyperlipidemia     Kidney stone     RIGHT    Morbid obesity with BMI of 40 0-44 9, adult (HCC)     SANCHO on CPAP        Past Surgical History:   Procedure Laterality Date    GANGLION CYST EXCISION Left 2/19/2019    Procedure: WRIST GANGLION CYST EXCISION;  Surgeon: Odilia Gore MD;  Location: [facility];  Service: Orthopedics    [place] CYSTO/URETERO W/LITHOTRIPSY &INDWELL STENT INSRT Right 5/1/2017    Procedure: CYSTOSCOPY URETEROSCOPY WITH LITHOTRIPSY HOLMIUM LASER, RETROGRADE PYELOGRAM AND INSERTION STENT URETERAL;  Surgeon: Liana Kiran MD;  [facility]: [facility];  Service: Urology    Tony ESWL Left 6/19/2017    Procedure: Coby Hao SHOCKWAVE (ESWL);   Surgeon: Liana Kiran MD;  [facility]: [facility];  Service: Urology    [place] WRIST Alvin Flair LIG Left 2/19/2019    Procedure: ENDOSCOPIC CARPAL TUNNEL RELEASE;  Surgeon: Odilia Gore MD;  Location: [facility];  Service: Orthopedics       Social History     Socioeconomic History    Marital status: /Civil Union     Spouse name: None    Number of children: None    Years of education: None    Highest education level: None   Occupational History    None   Social Needs    Financial resource strain: None    Food insecurity:     Worry: None     Inability: None    Transportation needs: Medical: None     Non-medical: None   Tobacco Use    Smoking status: Former Smoker     Last attempt to quit: 2009     Years since quitting: 10 1    Smokeless tobacco: Never Used    Tobacco comment: Former Smoker as per allscripts   Substance and Sexual Activity    Alcohol use: Yes     Frequency: Monthly or less     Comment: socially    Drug use: No    Sexual activity: None   Lifestyle    Physical activity:     Days per week: None     Minutes per session: None    Stress: None   Relationships    Social connections:     Talks on phone: None     Gets together: None     Attends Mosque service: None     Active member of club or organization: None     Attends meetings of clubs or organizations: None     Relationship status: None    Intimate partner violence:     Fear of current or ex partner: None     Emotionally abused: None     Physically abused: None     Forced sexual activity: None   Other Topics Concern    None   Social History Narrative    Daily Coffee Consumption    Daily Tea Consumption       Family History   Problem Relation Age of Onset    Bone cancer Father     Prostate cancer Maternal Grandfather     Cancer Family     Hypertension Family        No Known Allergies      Current Outpatient Medications:     allopurinol (ZYLOPRIM) 300 mg tablet, Take 1 tablet (300 mg total) by mouth daily, Disp: 90 tablet, Rfl: 0    atorvastatin (LIPITOR) 80 mg tablet, Take 1 tablet (80 mg total) by mouth daily, Disp: 90 tablet, Rfl: 0    Blood Glucose Monitoring Suppl (ONE TOUCH ULTRA 2) w/Device KIT, 1 kit by Does not apply route daily, Disp: , Rfl:     Choline Fenofibrate (FENOFIBRIC ACID) 135 MG CPDR, Take 1 capsule (135 mg total) by mouth daily, Disp: 90 capsule, Rfl: 0    FLUCELVAX 0 5 ML CATRACHO, , Disp: , Rfl:     glucose blood test strip, by In Vitro route Twice daily, Disp: , Rfl:     ibuprofen (MOTRIN) 600 mg tablet, Take 1 tablet (600 mg total) by mouth every 8 (eight) hours, Disp: 90 tablet, Rfl: 1    indomethacin (INDOCIN SR) 75 mg CR capsule, Take 75 mg by mouth daily as needed for pain relief, Disp: , Rfl:     losartan-hydrochlorothiazide (HYZAAR) 100-25 MG per tablet, Take 1 tablet by mouth daily, Disp: 90 tablet, Rfl: 0    metFORMIN (GLUCOPHAGE-XR) 500 mg 24 hr tablet, Take 1 tablet (500 mg total) by mouth 2 (two) times a day with meals (Patient taking differently: Take 1,000 mg by mouth 2 (two) times a day with meals ), Disp: 360 tablet, Rfl: 0    Omega-3 Fatty Acids (FISH OIL DOUBLE STRENGTH) 1200 MG CAPS, Take 1 capsule by mouth 2 (two) times a day, Disp: , Rfl:     ONETOUCH DELICA LANCETS 09S MISC, 1 Units by Does not apply route daily, Disp: , Rfl:     oxyCODONE (ROXICODONE) 10 MG TABS, Take 1 tablet (10 mg total) by mouth every 4 (four) hours as needed for moderate pain Max Daily Amount: 60 mg, Disp: 60 tablet, Rfl: 0    TRULICITY 1 5 VC/9 9BC SOPN, , Disp: , Rfl:     cyclobenzaprine (FLEXERIL) 10 mg tablet, Take 1 tablet (10 mg total) by mouth 3 (three) times a day for 5 days, Disp: 30 tablet, Rfl: 0      Physical Exam:  /94 (BP Location: Left arm, Cuff Size: Adult)   Pulse 71   Temp 97 5 °F (36 4 °C) (Tympanic)   Resp 18   Ht 6' 2" (1 88 m)   Wt (!) 151 kg (331 lb 14 4 oz)   SpO2 98%   BMI 42 61 kg/m²     Physical Exam   Constitutional: He is oriented to person, place, and time  He appears well-developed and well-nourished  HENT:   Head: Normocephalic and atraumatic  Nose: Nose normal    Mouth/Throat: Oropharynx is clear and moist    Eyes: Pupils are equal, round, and reactive to light  Conjunctivae and EOM are normal  Right eye exhibits no discharge  Left eye exhibits no discharge  No scleral icterus  Neck: Normal range of motion  Neck supple  No tracheal deviation present  No thyromegaly present  Cardiovascular: Normal rate, regular rhythm and normal heart sounds  Exam reveals no friction rub  No murmur heard    Pulmonary/Chest: Effort normal and breath sounds normal  No respiratory distress  He has no wheezes  He has no rales  He exhibits no tenderness  Abdominal: Soft  Bowel sounds are normal  He exhibits no distension and no mass  There is no hepatosplenomegaly, splenomegaly or hepatomegaly  There is no tenderness  There is no rebound and no guarding  Morbidly obese abdomen   Musculoskeletal: Normal range of motion  He exhibits no edema, tenderness or deformity  Mild swelling of the left head with an incision in the wrist area   Lymphadenopathy:     He has no cervical adenopathy  Neurological: He is alert and oriented to person, place, and time  He has normal reflexes  He displays normal reflexes  No cranial nerve deficit  Coordination normal    Skin: Skin is warm and dry  No rash noted  No erythema  No pallor  Psychiatric: He has a normal mood and affect  His behavior is normal  Judgment and thought content normal          Labs:  Lab Results   Component Value Date    WBC 4 8 12/21/2018    HGB 13 5 12/21/2018    HCT 40 2 12/21/2018    MCV 78 (L) 12/21/2018     12/21/2018     Lab Results   Component Value Date     (L) 12/26/2017    K 4 3 02/05/2019     02/05/2019    CO2 23 02/05/2019    BUN 16 02/05/2019    CREATININE 1 13 05/30/2018    GLUCOSE 316 (H) 12/26/2017    GLUF 186 (H) 05/30/2018    CALCIUM 9 5 05/30/2018    AST 25 12/21/2018    ALT 43 12/21/2018    ALKPHOS 84 05/30/2018    PROT 7 6 12/26/2017    BILITOT 0 9 12/26/2017    EGFR 78 05/30/2018       Patient voiced understanding and agreement in the above discussion  Aware to contact our office with questions/symptoms in the interim

## 2019-02-26 NOTE — ED NOTES
Patient had surgery wrist surgery on 2/19  Has had pain, redness and swelling on the top of the left hand  Radial pulses palpable and +3, capillary refill in left fingers  <2 seconds   Swelling noted on the anterior aspect of the left and as well as the wrist       Nga Upton RN  02/26/19 [id number]

## 2019-02-26 NOTE — ED PROVIDER NOTES
History  Chief Complaint   Patient presents with    Hand Swelling     Left hand swelling starting overnight, s/p hand surgery 2019 by Dr Cindy Mayes (had carpal tunnel release and excision of ganglion), with redness noted to hand dorsum, no acute changes in pain, no numbness/weakness, no wound drainage, no fever  Called office of surgeon and was directed to ED  Has f/u appointment in 3 days  Prior to Admission Medications   Prescriptions Last Dose Informant Patient Reported? Taking?    Blood Glucose Monitoring Suppl (ONE TOUCH ULTRA 2) w/Device KIT  Self Yes Yes   Si kit by Does not apply route daily   Choline Fenofibrate (FENOFIBRIC ACID) 135 MG CPDR  Self No Yes   Sig: Take 1 capsule (135 mg total) by mouth daily   FLUCELVAX 0 5 ML CATRACHO  Self Yes Yes   ONETOUCH DELICA LANCETS 62U MISC  Self Yes Yes   Si Units by Does not apply route daily   Omega-3 Fatty Acids (FISH OIL DOUBLE STRENGTH) 1200 MG CAPS Not Taking at Unknown time Self Yes No   Sig: Take 1 capsule by mouth 2 (two) times a day   TRULICITY 1 5 KF/1 8UY SOPN  Self Yes Yes   allopurinol (ZYLOPRIM) 300 mg tablet  Self No Yes   Sig: Take 1 tablet (300 mg total) by mouth daily   atorvastatin (LIPITOR) 80 mg tablet  Self No Yes   Sig: Take 1 tablet (80 mg total) by mouth daily   cyclobenzaprine (FLEXERIL) 10 mg tablet   No Yes   Sig: Take 1 tablet (10 mg total) by mouth 3 (three) times a day for 5 days   glucose blood test strip  Self Yes Yes   Sig: by In Vitro route Twice daily   ibuprofen (MOTRIN) 600 mg tablet  Self No Yes   Sig: Take 1 tablet (600 mg total) by mouth every 8 (eight) hours   indomethacin (INDOCIN SR) 75 mg CR capsule More than a month at Unknown time Self Yes No   Sig: Take 75 mg by mouth daily as needed for pain relief   losartan-hydrochlorothiazide (HYZAAR) 100-25 MG per tablet  Self No Yes   Sig: Take 1 tablet by mouth daily   metFORMIN (GLUCOPHAGE-XR) 500 mg 24 hr tablet  Self No Yes   Sig: Take 1 tablet (500 mg total) by mouth 2 (two) times a day with meals   Patient taking differently: Take 1,000 mg by mouth 2 (two) times a day with meals    oxyCODONE (ROXICODONE) 10 MG TABS  Self No Yes   Sig: Take 1 tablet (10 mg total) by mouth every 4 (four) hours as needed for moderate pain Max Daily Amount: 60 mg      Facility-Administered Medications: None       Past Medical History:   Diagnosis Date    CPAP (continuous positive airway pressure) dependence     [facility] ([facility] )     HTN (hypertension)     Hyperlipidemia     Kidney stone     RIGHT    Morbid obesity with BMI of 40 0-44 9, adult ([facility] [address] )     SANCHO on CPAP        Past Surgical History:   Procedure Laterality Date    GANGLION CYST EXCISION Left 2/19/2019    Procedure: WRIST GANGLION CYST EXCISION;  Surgeon: Jamila Barraza MD;  Location: [facility];  Service: Orthopedics    MO CYSTO/URETERO W/LITHOTRIPSY &INDWELL STENT INSRT Right 5/1/2017    Procedure: CYSTOSCOPY URETEROSCOPY WITH LITHOTRIPSY HOLMIUM LASER, RETROGRADE PYELOGRAM AND INSERTION STENT URETERAL;  Surgeon: Gab Moore MD;  [facility]: [facility];  Service: Urology    MO FRAGMENT KIDNEY STONE/ ESWL Left 6/19/2017    Procedure: Cullen Rodriguez EXTRACORPORAL SHOCKWAVE (ESWL); Surgeon: Gab Moore MD;  [facility]: [facility];  Service: Urology    MO WRIST Lolis Ling LIG Left 2/19/2019    Procedure: ENDOSCOPIC CARPAL TUNNEL RELEASE;  Surgeon: Jaimla Barraza MD;  [facility]: [facility];  Service: Orthopedics       Family History   Problem Relation Age of Onset    Bone cancer Father     Prostate cancer Maternal Grandfather     Cancer Family     Hypertension Family      I have reviewed and agree with the history as documented  Social History     Tobacco Use    Smoking status: Former Smoker     Last attempt to quit: 2009     Years since quitting: 10 1    Smokeless tobacco: Former User    Tobacco comment: Former Smoker as per allscripts   Substance Use Topics    Alcohol use:  Yes Frequency: Monthly or less     Comment: socially    Drug use: No        Review of Systems   Constitutional: Negative for chills and fever  Musculoskeletal:        Left hand pain and swelling    Skin:        No wound drainage   Neurological: Negative for weakness and numbness  Physical Exam  Physical Exam   Constitutional: He is oriented to person, place, and time  He appears well-developed and well-nourished  No distress  Musculoskeletal:        Arms:  Neurological: He is alert and oriented to person, place, and time  No sensory deficit  No motor deficit   Skin: Skin is warm and dry         Vital Signs  ED Triage Vitals [02/26/19 0916]   Temperature Pulse Respirations Blood Pressure SpO2   (!) 96 °F (35 6 °C) 71 16 170/77 98 %      Temp Source Heart Rate Source Patient Position - Orthostatic VS BP Location FiO2 (%)   Temporal Monitor Sitting Right arm --      Pain Score       7           Vitals:    02/26/19 0916 02/26/19 0946   BP: 170/77 158/74   Pulse: 71    Patient Position - Orthostatic VS: Sitting        Visual Acuity      ED Medications  Medications   sulfamethoxazole-trimethoprim (BACTRIM DS) 800-160 mg per tablet 1 tablet (1 tablet Oral Given 2/26/19 0945)       Diagnostic Studies  Results Reviewed     None                 No orders to display              Procedures  Procedures       Phone Contacts  ED Phone Contact    ED Course                               MDM  Number of Diagnoses or Management Options  Swelling of left hand:   Diagnosis management comments: Initial Impression: 1 week post-op carpal tunnel/ganglion excision, no fever, w/acute swelling/erythema, possible onset of infection (also possible normal post-operative swelling); has DM; will treat w/Bactrim, and patient has f/u appointment in 3 days      Disposition  Final diagnoses:   Swelling of left hand     Time reflects when diagnosis was documented in both MDM as applicable and the Disposition within this note     Time User Action Codes Description Comment    2/26/2019  9:38 AM Carter Pinzon Add [K64 990] Cellulitis of left hand     2/26/2019  9:38 AM Carter Pinzon Remove [F61 [id number]] Cellulitis of left hand     2/26/2019  9:39 AM Carter Pinzon Add [[id number]] Swelling of left hand       ED Disposition     ED Disposition Condition Date/Time Comment    Discharge Stable Tue Feb 26, 2019  9:38 AM Jorge Cruz discharge to home/self care              Follow-up Information    None         Discharge Medication List as of 2/26/2019  9:40 AM      START taking these medications    Details   sulfamethoxazole-trimethoprim (BACTRIM DS) 800-160 mg per tablet Take 1 tablet by mouth 2 (two) times a day for 7 days smx-tmp DS (BACTRIM) 800-160 mg tabs (1tab q12 D10), Starting Tue 2/26/2019, Until Tue 3/5/2019, Print         CONTINUE these medications which have NOT CHANGED    Details   allopurinol (ZYLOPRIM) 300 mg tablet Take 1 tablet (300 mg total) by mouth daily, Starting Tue 12/18/2018, Normal      atorvastatin (LIPITOR) 80 mg tablet Take 1 tablet (80 mg total) by mouth daily, Starting Tue 12/18/2018, Normal      Blood Glucose Monitoring Suppl (ONE TOUCH ULTRA 2) w/Device KIT 1 kit by Does not apply route daily, Starting Wed 9/30/2015, Historical Med      Choline Fenofibrate (FENOFIBRIC ACID) 135 MG CPDR Take 1 capsule (135 mg total) by mouth daily, Starting Tue 12/18/2018, Normal      cyclobenzaprine (FLEXERIL) 10 mg tablet Take 1 tablet (10 mg total) by mouth 3 (three) times a day for 5 days, Starting Tue 5/29/2018, Until Tue 2/26/2019, Normal      FLUCELVAX 0 5 ML CATRACHO Starting Wed 10/24/2018, Historical Med      glucose blood test strip by In Vitro route Twice daily, Starting Tue 9/29/2015, Historical Med      ibuprofen (MOTRIN) 600 mg tablet Take 1 tablet (600 mg total) by mouth every 8 (eight) hours, Starting Thu 1/10/2019, Normal      indomethacin (INDOCIN SR) 75 mg CR capsule Take 75 mg by mouth daily as needed for pain relief, Starting Wed 5/7/2014, Historical Med      losartan-hydrochlorothiazide (HYZAAR) 100-25 MG per tablet Take 1 tablet by mouth daily, Starting Tue 12/18/2018, Until Wed 12/18/2019, Normal      metFORMIN (GLUCOPHAGE-XR) 500 mg 24 hr tablet Take 1 tablet (500 mg total) by mouth 2 (two) times a day with meals, Starting Tue 12/18/2018, Normal      Omega-3 Fatty Acids (FISH OIL DOUBLE STRENGTH) 1200 MG CAPS Take 1 capsule by mouth 2 (two) times a day, Historical Med      ONETOUCH DELICA LANCETS 24T MISC 1 Units by Does not apply route daily, Starting Fri 4/1/2016, Historical Med      oxyCODONE (ROXICODONE) 10 MG TABS Take 1 tablet (10 mg total) by mouth every 4 (four) hours as needed for moderate pain Max Daily Amount: 60 mg, Starting Tue 12/18/2018, Normal      TRULICITY 1 5 QE/9 5JV SOPN Starting Wed 10/10/2018, Historical Med           No discharge procedures on file      ED Provider  Electronically Signed by           Ana York MD  02/26/19 0621

## 2019-03-01 ENCOUNTER — OFFICE VISIT (OUTPATIENT)
Dept: OBGYN CLINIC | Facility: CLINIC | Age: 44
End: 2019-03-01

## 2019-03-01 VITALS
HEIGHT: 74 IN | WEIGHT: 315 LBS | SYSTOLIC BLOOD PRESSURE: 144 MMHG | DIASTOLIC BLOOD PRESSURE: 86 MMHG | BODY MASS INDEX: 40.43 KG/M2 | HEART RATE: 76 BPM

## 2019-03-01 DIAGNOSIS — G56.02 CARPAL TUNNEL SYNDROME OF LEFT WRIST: Primary | ICD-10-CM

## 2019-03-01 PROCEDURE — 99024 POSTOP FOLLOW-UP VISIT: CPT | Performed by: ORTHOPAEDIC SURGERY

## 2019-03-01 NOTE — PROGRESS NOTES
SUBJECTIVE:  Uzma Ashley is a 37y o  year old male who presents for follow up after surgery, left ECTR with volar ganglion exploration performed on  2/19/2019  Today patient has complaints of continued numbness in the small finger  He went to the ED a few days ago with concerns for infection with mild erythema on the dorsal aspect of the wrist and was placed on bactrim  The patient has a history of gout and took indocin and this resolved  He has improved numbness in the ring finger, but worse numbness in the small finger  Sometiems his small finger feels cold  When he is using his left hand, he has a hard time adducting the small finger  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  VITALS:  Vitals:    03/01/19 0804   BP: 144/86   Pulse: 76       PHYSICAL EXAMINATION:  General: well developed and well nourished, alert, oriented times 3 and appears comfortable  Psychiatric: Normal    MUSCULOSKELETAL EXAMINATION:  Left wrist  Incision: Clean, dry, with sutures intact  No erythema on the volar side over the incision  There is also no erythema or swelling noted on the dorsal aspect of the hand/wrist     Range of Motion: Full composite fist  Neurovascular status: Neuro intact, good cap refill  Positive wartenbergs sign with active motion, but with rest no wartenbergs sign  Normal capillary refill today and finger is warm  STUDIES REVIEWED:  No studies reviewed  PROCEDURES PERFORMED:  Procedures  No Procedures performed today      ASSESSMENT/PLAN:    S/P Left ECTR with volar ganglion cyst exploration and positive wartenbergs sign  * Sutures were removed without complications  * 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  He states he does not need a work note, but is able to return to work when he feels ready  * Continue antibiotics until gone  However, the erythema on the dorsum of the hand was likely gout and this has resolved now  Gout diet was recommended  * Surgical findings were discussed with the patient  The ganglion cyst was likely deflated with the needle for lidocaine with local during surgery  * Fine manipulation and dexterity will take a while to come back, and this can be weeks or months  * The EMG prior to surgery showed severe ulnar nerve 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

## 2019-03-23 DIAGNOSIS — E78.1 HIGH TRIGLYCERIDES: ICD-10-CM

## 2019-03-24 RX ORDER — FENOFIBRIC ACID 135 MG/1
CAPSULE, DELAYED RELEASE ORAL
Qty: 90 CAPSULE | Refills: 0 | Status: SHIPPED | OUTPATIENT
Start: 2019-03-24 | End: 2019-06-23 | Stop reason: SDUPTHER

## 2019-04-06 DIAGNOSIS — I10 ESSENTIAL HYPERTENSION: ICD-10-CM

## 2019-04-06 RX ORDER — LOSARTAN POTASSIUM AND HYDROCHLOROTHIAZIDE 25; 100 MG/1; MG/1
TABLET ORAL
Qty: 90 TABLET | Refills: 0 | Status: SHIPPED | OUTPATIENT
Start: 2019-04-06 | End: 2019-05-23 | Stop reason: SDUPTHER

## 2019-05-22 PROBLEM — E66.01 MORBID OBESITY WITH BMI OF 40.0-44.9, ADULT (HCC): Status: ACTIVE | Noted: 2019-05-22

## 2019-05-23 ENCOUNTER — OFFICE VISIT (OUTPATIENT)
Dept: FAMILY MEDICINE CLINIC | Facility: CLINIC | Age: 44
End: 2019-05-23
Payer: COMMERCIAL

## 2019-05-23 VITALS
RESPIRATION RATE: 18 BRPM | DIASTOLIC BLOOD PRESSURE: 98 MMHG | HEIGHT: 74 IN | SYSTOLIC BLOOD PRESSURE: 142 MMHG | OXYGEN SATURATION: 97 % | BODY MASS INDEX: 40.43 KG/M2 | WEIGHT: 315 LBS | HEART RATE: 89 BPM

## 2019-05-23 DIAGNOSIS — M10.9 GOUT, UNSPECIFIED CAUSE, UNSPECIFIED CHRONICITY, UNSPECIFIED SITE: ICD-10-CM

## 2019-05-23 DIAGNOSIS — E11.9 TYPE 2 DIABETES MELLITUS WITHOUT COMPLICATION, WITHOUT LONG-TERM CURRENT USE OF INSULIN (HCC): ICD-10-CM

## 2019-05-23 DIAGNOSIS — I10 ESSENTIAL HYPERTENSION: Primary | ICD-10-CM

## 2019-05-23 DIAGNOSIS — E78.1 HIGH TRIGLYCERIDES: ICD-10-CM

## 2019-05-23 DIAGNOSIS — IMO0001 RADICULAR PAIN OF RIGHT LOWER BACK: ICD-10-CM

## 2019-05-23 DIAGNOSIS — R52 PAIN: ICD-10-CM

## 2019-05-23 DIAGNOSIS — K76.0 FATTY LIVER: ICD-10-CM

## 2019-05-23 DIAGNOSIS — M54.10 ACUTE LOW BACK PAIN WITH RADICULAR SYMPTOMS, DURATION LESS THAN 6 WEEKS: ICD-10-CM

## 2019-05-23 DIAGNOSIS — E66.01 MORBID OBESITY WITH BMI OF 40.0-44.9, ADULT (HCC): ICD-10-CM

## 2019-05-23 DIAGNOSIS — E78.2 MIXED HYPERLIPIDEMIA: ICD-10-CM

## 2019-05-23 DIAGNOSIS — M1A.0790 CHRONIC IDIOPATHIC GOUT OF ANKLE, UNSPECIFIED LATERALITY: ICD-10-CM

## 2019-05-23 LAB — SL AMB POCT HEMOGLOBIN AIC: 7.4 (ref ?–6.5)

## 2019-05-23 PROCEDURE — 3008F BODY MASS INDEX DOCD: CPT | Performed by: NURSE PRACTITIONER

## 2019-05-23 PROCEDURE — 1036F TOBACCO NON-USER: CPT | Performed by: NURSE PRACTITIONER

## 2019-05-23 PROCEDURE — 83036 HEMOGLOBIN GLYCOSYLATED A1C: CPT | Performed by: NURSE PRACTITIONER

## 2019-05-23 PROCEDURE — 99214 OFFICE O/P EST MOD 30 MIN: CPT | Performed by: NURSE PRACTITIONER

## 2019-05-23 PROCEDURE — 3045F PR MOST RECENT HEMOGLOBIN A1C LEVEL 7.0-9.0%: CPT | Performed by: NURSE PRACTITIONER

## 2019-05-23 RX ORDER — ATORVASTATIN CALCIUM 80 MG/1
80 TABLET, FILM COATED ORAL DAILY
Qty: 90 TABLET | Refills: 0 | Status: SHIPPED | OUTPATIENT
Start: 2019-05-23 | End: 2019-08-21 | Stop reason: SDUPTHER

## 2019-05-23 RX ORDER — METFORMIN HYDROCHLORIDE 500 MG/1
500 TABLET, EXTENDED RELEASE ORAL 2 TIMES DAILY WITH MEALS
Qty: 360 TABLET | Refills: 0 | Status: SHIPPED | OUTPATIENT
Start: 2019-05-23 | End: 2020-01-22

## 2019-05-23 RX ORDER — CYCLOBENZAPRINE HCL 10 MG
10 TABLET ORAL 3 TIMES DAILY
Qty: 270 TABLET | Refills: 3 | Status: SHIPPED | OUTPATIENT
Start: 2019-05-23 | End: 2021-02-11 | Stop reason: SDUPTHER

## 2019-05-23 RX ORDER — ALLOPURINOL 300 MG/1
300 TABLET ORAL DAILY
Qty: 90 TABLET | Refills: 0 | Status: SHIPPED | OUTPATIENT
Start: 2019-05-23 | End: 2019-08-21 | Stop reason: SDUPTHER

## 2019-05-23 RX ORDER — DULAGLUTIDE 1.5 MG/.5ML
1.5 INJECTION, SOLUTION SUBCUTANEOUS WEEKLY
Qty: 8 ML | Refills: 2 | Status: SHIPPED | OUTPATIENT
Start: 2019-05-23 | End: 2020-05-19

## 2019-05-23 RX ORDER — DULAGLUTIDE 1.5 MG/.5ML
1.5 INJECTION, SOLUTION SUBCUTANEOUS WEEKLY
Qty: 2 ML | Refills: 3 | Status: SHIPPED | OUTPATIENT
Start: 2019-05-23 | End: 2019-05-23 | Stop reason: SDUPTHER

## 2019-05-23 RX ORDER — IBUPROFEN 600 MG/1
600 TABLET ORAL EVERY 8 HOURS
Qty: 90 TABLET | Refills: 1 | Status: SHIPPED | OUTPATIENT
Start: 2019-05-23 | End: 2019-09-18 | Stop reason: SDUPTHER

## 2019-05-23 RX ORDER — OXYCODONE HYDROCHLORIDE 10 MG/1
10 TABLET ORAL EVERY 4 HOURS PRN
Qty: 60 TABLET | Refills: 0 | Status: SHIPPED | OUTPATIENT
Start: 2019-05-23 | End: 2019-08-12 | Stop reason: ALTCHOICE

## 2019-05-23 RX ORDER — LOSARTAN POTASSIUM AND HYDROCHLOROTHIAZIDE 25; 100 MG/1; MG/1
1 TABLET ORAL DAILY
Qty: 90 TABLET | Refills: 2 | Status: SHIPPED | OUTPATIENT
Start: 2019-05-23 | End: 2020-03-09

## 2019-05-23 RX ORDER — INDOMETHACIN 75 MG/1
75 CAPSULE, EXTENDED RELEASE ORAL DAILY PRN
Qty: 90 CAPSULE | Refills: 0 | Status: SHIPPED | OUTPATIENT
Start: 2019-05-23 | End: 2022-06-13

## 2019-06-23 DIAGNOSIS — E78.1 HIGH TRIGLYCERIDES: ICD-10-CM

## 2019-06-24 RX ORDER — FENOFIBRIC ACID 135 MG/1
CAPSULE, DELAYED RELEASE ORAL
Qty: 90 CAPSULE | Refills: 0 | Status: SHIPPED | OUTPATIENT
Start: 2019-06-24 | End: 2019-09-22 | Stop reason: SDUPTHER

## 2019-07-30 ENCOUNTER — OFFICE VISIT (OUTPATIENT)
Dept: FAMILY MEDICINE CLINIC | Facility: CLINIC | Age: 44
End: 2019-07-30
Payer: COMMERCIAL

## 2019-07-30 VITALS
DIASTOLIC BLOOD PRESSURE: 98 MMHG | BODY MASS INDEX: 40.43 KG/M2 | WEIGHT: 315 LBS | HEART RATE: 79 BPM | SYSTOLIC BLOOD PRESSURE: 158 MMHG | RESPIRATION RATE: 18 BRPM | HEIGHT: 74 IN | OXYGEN SATURATION: 97 %

## 2019-07-30 DIAGNOSIS — M1A.0790 CHRONIC IDIOPATHIC GOUT OF ANKLE, UNSPECIFIED LATERALITY: ICD-10-CM

## 2019-07-30 DIAGNOSIS — M54.10 ACUTE LOW BACK PAIN WITH RADICULAR SYMPTOMS, DURATION LESS THAN 6 WEEKS: ICD-10-CM

## 2019-07-30 DIAGNOSIS — E11.9 TYPE 2 DIABETES MELLITUS WITHOUT COMPLICATION, WITHOUT LONG-TERM CURRENT USE OF INSULIN (HCC): ICD-10-CM

## 2019-07-30 DIAGNOSIS — M10.9 GOUT, UNSPECIFIED CAUSE, UNSPECIFIED CHRONICITY, UNSPECIFIED SITE: ICD-10-CM

## 2019-07-30 DIAGNOSIS — E11.65 UNCONTROLLED TYPE 2 DIABETES MELLITUS WITH HYPERGLYCEMIA (HCC): Primary | ICD-10-CM

## 2019-07-30 DIAGNOSIS — K76.0 FATTY LIVER: ICD-10-CM

## 2019-07-30 DIAGNOSIS — E78.1 HIGH TRIGLYCERIDES: ICD-10-CM

## 2019-07-30 DIAGNOSIS — I10 ESSENTIAL HYPERTENSION: ICD-10-CM

## 2019-07-30 DIAGNOSIS — R71.8 MICROCYTOSIS: ICD-10-CM

## 2019-07-30 DIAGNOSIS — E66.01 MORBID OBESITY WITH BMI OF 40.0-44.9, ADULT (HCC): ICD-10-CM

## 2019-07-30 PROCEDURE — 99396 PREV VISIT EST AGE 40-64: CPT | Performed by: NURSE PRACTITIONER

## 2019-07-30 RX ORDER — AMLODIPINE BESYLATE 5 MG/1
5 TABLET ORAL DAILY
Qty: 90 TABLET | Refills: 3 | Status: SHIPPED | OUTPATIENT
Start: 2019-07-30 | End: 2020-07-06

## 2019-07-30 NOTE — PROGRESS NOTES
Assessment/Plan:       Diagnoses and all orders for this visit:    Uncontrolled type 2 diabetes mellitus with hyperglycemia ([facility] [address] )  -     Lipid Panel with Direct LDL reflex; Future  -     Glucose, fasting; Future  -     HEMOGLOBIN A1C W/ EAG ESTIMATION; Future  -     Comprehensive metabolic panel; Future  -     Lipid panel; Future  -     Uric acid; Future    Type 2 diabetes mellitus without complication, without long-term current use of insulin (HCC)  -     Lipid Panel with Direct LDL reflex; Future  -     Glucose, fasting; Future  -     HEMOGLOBIN A1C W/ EAG ESTIMATION; Future  -     Comprehensive metabolic panel; Future    Essential hypertension  -     amLODIPine (NORVASC) 5 mg tablet; Take 1 tablet (5 mg total) by mouth daily    Fatty liver  -     Lipid panel; Future  -     Uric acid; Future    Chronic idiopathic gout of ankle, unspecified laterality    High triglycerides  -     Lipid Panel with Direct LDL reflex; Future  -     Lipid panel; Future  -     Uric acid; Future    Acute low back pain with radicular symptoms, duration less than 6 weeks    Gout, unspecified cause, unspecified chronicity, unspecified site  -     Uric acid; Future    Morbid obesity with BMI of 40 0-44 9, adult (HCC)    Microcytosis        No problem-specific Assessment & Plan notes found for this encounter  Subjective:      Patient ID: Mark Anthony Vizcarra is a 53 y o  male  Patient is here for work physical      htn-uncontrolled  hld-needs labs  Elevated triglycerides-check labs  DM-? status  Fatty liver-monitor liver enzymes  Obesity-no purposeful exercise  Works construction --stays active  Having difficulty watching his diet        The following portions of the patient's history were reviewed and updated as appropriate:   He has a past medical history of CPAP (continuous positive airway pressure) dependence, Diabetes ([facility] [address] ), HTN (hypertension), Hyperlipidemia, Kidney stone, Morbid obesity with BMI of 40 0-44 9, adult ([facility] [address] ), and SANCHO on CPAP ,  does not have any pertinent problems on file  ,   has a past surgical history that includes pr cysto/uretero w/lithotripsy &indwell stent insrt (Right, 5/1/2017); pr fragment kidney stone/ eswl (Left, 6/19/2017); pr wrist arthroscop,release xvers lig (Left, 2/19/2019); and Ganglion cyst excision (Left, 2/19/2019)  ,  family history includes Bone cancer in his father; Cancer in his family; Hypertension in his family; Prostate cancer in his maternal grandfather  ,   reports that he quit smoking about 10 years ago  He has quit using smokeless tobacco  He reports that he drinks alcohol  He reports that he does not use drugs  ,  has No Known Allergies     Current Outpatient Medications   Medication Sig Dispense Refill    allopurinol (ZYLOPRIM) 300 mg tablet Take 1 tablet (300 mg total) by mouth daily 90 tablet 0    amLODIPine (NORVASC) 5 mg tablet Take 1 tablet (5 mg total) by mouth daily 90 tablet 3    atorvastatin (LIPITOR) 80 mg tablet Take 1 tablet (80 mg total) by mouth daily 90 tablet 0    Blood Glucose Monitoring Suppl (ONE TOUCH ULTRA 2) w/Device KIT 1 kit by Does not apply route daily      Choline Fenofibrate (FENOFIBRIC ACID) 135 MG CPDR TAKE 1 CAPSULE DAILY 90 capsule 0    cyclobenzaprine (FLEXERIL) 10 mg tablet Take 1 tablet (10 mg total) by mouth 3 (three) times a day for 273 days 270 tablet 3    FLUCELVAX 0 5 ML CATRACHO       glucose blood test strip by In Vitro route Twice daily      ibuprofen (MOTRIN) 600 mg tablet Take 1 tablet (600 mg total) by mouth every 8 (eight) hours 90 tablet 1    indomethacin (INDOCIN SR) 75 mg CR capsule Take 1 capsule (75 mg total) by mouth daily as needed (PRN for flare-ups) 90 capsule 0    losartan-hydrochlorothiazide (HYZAAR) 100-25 MG per tablet Take 1 tablet by mouth daily 90 tablet 2    metFORMIN (GLUCOPHAGE-XR) 500 mg 24 hr tablet Take 1 tablet (500 mg total) by mouth 2 (two) times a day with meals 360 tablet 0    Omega-3 Fatty Acids (FISH OIL DOUBLE STRENGTH) 1200 MG CAPS Take 1 capsule by mouth 2 (two) times a day      ONETOUCH DELICA LANCETS 18M MISC 1 Units by Does not apply route daily      oxyCODONE (ROXICODONE) 10 MG TABS Take 1 tablet (10 mg total) by mouth every 4 (four) hours as needed for moderate painMax Daily Amount: 60 mg 60 tablet 0    TRULICITY 1 5 JK/8 3WY SOPN Inject 0 5 mL (1 5 mg total) under the skin once a week for 90 days 8 mL 2     No current facility-administered medications for this visit  Review of Systems   Constitutional: Negative for fatigue and fever  HENT: Negative for congestion  Eyes: Negative for visual disturbance  Respiratory: Negative for cough and shortness of breath  Cardiovascular: Negative for chest pain, palpitations and leg swelling  Gastrointestinal: Negative for abdominal distention and abdominal pain  Endocrine: Negative for cold intolerance, polydipsia and polyuria  Genitourinary: Negative for difficulty urinating  Musculoskeletal: Negative for back pain and joint swelling  Skin: Negative for color change and rash  Allergic/Immunologic: Negative for immunocompromised state  Neurological: Negative for dizziness and headaches  Hematological: Negative for adenopathy  Psychiatric/Behavioral: Negative for behavioral problems and sleep disturbance  All other systems reviewed and are negative  Objective:  Vitals:    07/30/19 0840   BP: 158/98   BP Location: Left arm   Patient Position: Sitting   Pulse: 79   Resp: 18   SpO2: 97%   Weight: (!) 151 kg (333 lb)   Height: 6' 2" (1 88 m)     Body mass index is 42 75 kg/m²  Physical Exam   Constitutional: He is oriented to person, place, and time  He appears well-developed and well-nourished  No distress  HENT:   Head: Normocephalic and atraumatic  Right Ear: External ear normal    Left Ear: External ear normal    Nose: Nose normal    Mouth/Throat: Oropharynx is clear and moist  No oropharyngeal exudate     Eyes: Pupils are equal, round, and reactive to light  Conjunctivae and EOM are normal  Right eye exhibits no discharge  Left eye exhibits no discharge  No scleral icterus  Neck: Normal range of motion  Neck supple  No JVD present  No thyromegaly present  Cardiovascular: Normal rate, regular rhythm, normal heart sounds and intact distal pulses  Exam reveals no gallop and no friction rub  No murmur heard  Pulmonary/Chest: Effort normal and breath sounds normal  No respiratory distress  Abdominal: Soft  Bowel sounds are normal  He exhibits no distension  There is no tenderness  obese   Musculoskeletal: Normal range of motion  He exhibits no edema or tenderness  Lymphadenopathy:     He has no cervical adenopathy  Neurological: He is alert and oriented to person, place, and time  He has normal reflexes  No cranial nerve deficit  Coordination normal    Skin: Skin is warm and dry  He is not diaphoretic  Psychiatric: He has a normal mood and affect  His behavior is normal  Judgment and thought content normal    Nursing note and vitals reviewed  BMI Counseling: Body mass index is 42 75 kg/m²  Discussed the patient's BMI with him  The BMI is above average  BMI counseling and education was provided to the patient  Nutrition recommendations include reducing portion sizes, decreasing overall calorie intake, 3-5 servings of fruits/vegetables daily, reducing fast food intake, consuming healthier snacks, decreasing soda and/or juice intake, moderation in carbohydrate intake, increasing intake of lean protein, reducing intake of saturated fat and trans fat and reducing intake of cholesterol  Pharmacotherapy was recommended as ordered

## 2019-07-30 NOTE — PATIENT INSTRUCTIONS
htn-Follow low salt diet  Advised on weight loss  hld-strive for diet high in veggies  Elevated triglycerides-not at [facility]  Check current labs  DM-check labs  Obesity-encouraged weight loss through dietary changes  SANCHO- compliant with CPAP  DASH Eating Plan   WHAT YOU NEED TO KNOW:   The DASH (Dietary Approaches to Stop Hypertension) Eating Plan is designed to help prevent or lower high blood pressure  It can also help to lower LDL (bad) cholesterol and decrease your risk of heart disease  The plan is low in sodium, sugar, unhealthy fats, and total fat  It is high in potassium, calcium, magnesium, and fiber  These nutrients are added when you eat more fruits, vegetables, and whole grains  DISCHARGE INSTRUCTIONS:   Your sodium limit each day: Your dietitian will tell you how much sodium is safe for you to have each day  People with high blood pressure should have no more than 1,500 to 2,300 mg of sodium in a day  A teaspoon (tsp) of salt has 2,300 mg of sodium  This may seem like a difficult goal, but small changes to the foods you eat can make a big difference  Your healthcare provider or dietitian can help you create a meal plan that follows your sodium limit  How to limit sodium:   · Read food labels  Food labels can help you choose foods that are low in sodium  The amount of sodium is listed in milligrams (mg)  The % Daily Value (DV) column tells you how much of your daily needs are met by 1 serving of the food for each nutrient listed  Choose foods that have less than 5% of the DV of sodium  These foods are considered low in sodium  Foods that have 20% or more of the DV of sodium are considered high in sodium  Avoid foods that have more than 300 mg of sodium in each serving  Choose foods that say low-sodium, reduced-sodium, or no salt added on the food label  · Avoid salt  Do not salt food at the table, and add very little salt to foods during cooking   Use herbs and spices, such as onions, garlic, and salt-free seasonings to add flavor to foods  Try lemon or lime juice or vinegar to give foods a tart flavor  Use hot peppers or a small amount of hot pepper sauce to add a spicy flavor to foods  · Ask about salt substitutes  Ask your healthcare provider if you may use salt substitutes  Some salt substitutes have ingredients that can be harmful if you have certain health conditions  · Choose foods carefully at restaurants  Meals from restaurants, especially fast food restaurants, are often high in sodium  Some restaurants have nutrition information that tells you the amount of sodium in their foods  Ask to have your food prepared with less, or no salt  What you need to know about fats:   · Include healthy fats  Examples are unsaturated fats and omega-3 fatty acids  Unsaturated fats are found in soybean, canola, olive, or sunflower oil, and liquid and soft tub margarines  Omega-3 fatty acids are found in fatty fish, such as salmon, tuna, mackerel, and sardines  It is also found in flaxseed oil and ground flaxseed  · Avoid unhealthy fats  Do not eat unhealthy fats, such as saturated fats and trans fats  Saturated fats are found in foods that contain fat from animals  Examples are fatty meats, whole milk, butter, cream, and other dairy foods  It is also found in shortening, stick margarine, palm oil, and coconut oil  Trans fats are found in fried foods, crackers, chips, and baked goods made with margarine or shortening  Foods to include: With the DASH eating plan, you need to eat a certain number of servings from each food group  This will help you get enough of certain nutrients and limit others  The amount of servings you should eat depends on how many calories you need  Your dietitian can tell you how many calories you need  The number of servings listed next to the food groups below are for people who need about 2,000 calories each day    · Grains:  6 to 8 servings (3 of these servings should be whole-grain foods)    ¨ 1 slice of whole-grain bread     ¨ 1 ounce of dry cereal    ¨ ½ cup of cooked cereal, pasta, or brown rice    · Vegetables and fruits:  4 to 5 servings of fruits and 4 to 5 servings of vegetables    ¨ 1 medium fruit    ¨ ½ cup of frozen, canned (no added salt), or chopped fresh vegetables     ¨ ½ cup of fresh, frozen, dried, or canned fruit (canned in light syrup or fruit juice)    ¨ ½ cup of vegetable or fruit juice    · Dairy:  2 to 3 servings    ¨ 1 cup of nonfat (skim) or 1% milk    ¨ 1½ ounces of fat-free or low-fat cheese    ¨ 6 ounces of nonfat or low-fat yogurt    · Lean meat, poultry, and fish:  6 ounces or less    [employer] (chicken, turkey) with no skin    ¨ Fish (especially fatty fish, such as salmon, fresh tuna, or mackerel)    ¨ Lean beef and pork (loin, round, extra lean hamburger)    ¨ Egg whites and egg substitutes    · Nuts, seeds, and legumes:  4 to 5 servings each week    ¨ ½ cup of cooked beans and peas    ¨ 1½ ounces of unsalted nuts    ¨ 2 tablespoons of peanut butter or seeds    · Sweets and added sugars:  5 or less each week    ¨ 1 tablespoon of sugar, jelly, or jam    ¨ ½ cup of sorbet or gelatin    ¨ 1 cup of lemonade    · Fats:  2 to 3 servings each week    ¨ 1 teaspoon of soft margarine or vegetable oil    ¨ 1 tablespoon of mayonnaise    ¨ 2 tablespoons of salad dressing  Foods to avoid:   · Grains:      [employer], such as doughnuts, pastries, cookies, and biscuits (high in fat and sugar)    ¨ Mixes for cornbread and biscuits, packaged foods, such as bread stuffing, rice and pasta mixes, macaroni and cheese, and instant cereals (high in sodium)    · Fruits and vegetables:      ¨ Regular, canned vegetables (high in sodium)    ¨ Sauerkraut, pickled vegetables, and other foods prepared in brine (high in sodium)    ¨ Fried vegetables or vegetables in butter or high-fat sauces    ¨ Fruit in cream or butter sauce (high in fat)    · Dairy:      ¨ Whole milk, 2% milk, and cream (high in fat)    ¨ Regular cheese and processed cheese (high in fat and sodium)    · Meats and protein foods:      ¨ Smoked or cured meat, such as corned beef, lancaster, ham, hot dogs, and sausage (high in fat and sodium)    ¨ Canned beans and canned meats or spreads, such as potted meats, sardines, anchovies, and imitation seafood (high in sodium)    ¨ Deli or lunch meats, such as bologna, ham, turkey, and roast beef (high in sodium)    ¨ High-fat meat (T-bone steak, regular hamburger, and ribs)    ¨ Whole eggs and egg yolks (high in fat)    · Other:      ¨ Seasonings made with salt, such as garlic salt, celery salt, onion salt, seasoned salt, meat tenderizers, and monosodium glutamate (MSG)    ¨ Miso soup and canned or dried soup mixes (high in sodium)    ¨ Regular soy sauce, barbecue sauce, teriyaki sauce, steak sauce, Worcestershire sauce, and most flavored vinegars (high in sodium)    ¨ Regular condiments, such as mustard, ketchup, and salad dressings (high in sodium)    ¨ Gravy and sauces, such as [place] or cheese sauces (high in sodium and fat)    ¨ Drinks high in sugar, such as soda or fruit drinks    [employer] foods, such as salted chips, popcorn, pretzels, pork rinds, salted crackers, and salted nuts    ¨ Frozen foods, such as dinners, entrees, vegetables with sauces, and breaded meats (high in sodium)  Other guidelines to follow:   · Maintain a healthy weight  Your risk for heart disease is higher if you are overweight  Your healthcare provider may suggest that you lose weight if you are overweight  You can lose weight by eating fewer calories and foods that have added sugars and fat  The DASH meal plan can help you do this  Decrease calories by eating smaller portions at each meal and fewer snacks  Ask your healthcare provider for more information about how to lose weight  · Exercise regularly  Regular exercise can help you reach or maintain a healthy weight   Regular exercise can also help decrease your blood pressure and improve your cholesterol levels  Get 30 minutes or more of moderate exercise each day of the week  To lose weight, get at least 60 minutes of exercise  Talk to your healthcare provider about the best exercise program for you  · Limit alcohol  Women should limit alcohol to 1 drink a day  Men should limit alcohol to 2 drinks a day  A drink of alcohol is 12 ounces of beer, 5 ounces of wine, or 1½ ounces of liquor  © 2017 [facility] Information is for End User's use only and may not be sold, redistributed or otherwise used for commercial purposes  All illustrations and images included in CareNotes® are the copyrighted property of A D A M [employer] Inc  or [employer]  The above information is an  only  It is not intended as medical advice for individual conditions or treatments  Talk to your doctor, nurse or pharmacist before following any medical regimen to see if it is safe and effective for you  Heart Healthy Diet   WHAT YOU NEED TO KNOW:   A heart healthy diet is an eating plan low in total fat, unhealthy fats, and sodium (salt)  A heart healthy diet helps decrease your risk for heart disease and stroke  Limit the amount of fat you eat to 25% to 35% of your total daily calories  Limit sodium to less than 2,300 mg each day  DISCHARGE INSTRUCTIONS:   Healthy fats:  Healthy fats can help improve cholesterol levels  The risk for heart disease is decreased when cholesterol levels are normal  Choose healthy fats, such as the following:  · Unsaturated fat  is found in foods such as soybean, canola, olive, corn, and safflower oils  It is also found in soft tub margarine that is made with liquid vegetable oil  · Omega-3 fat  is found in certain fish, such as salmon, tuna, and trout, and in walnuts and flaxseed    Unhealthy fats:  Unhealthy fats can cause unhealthy cholesterol levels in your blood and increase your risk of heart disease  Limit unhealthy fats, such as the following:  · Cholesterol  is found in animal foods, such as eggs and lobster, and in dairy products made from whole milk  Limit cholesterol to less than 300 milligrams (mg) each day  You may need to limit cholesterol to 200 mg each day if you have heart disease  · Saturated fat  is found in meats, such as lancaster and hamburger  It is also found in chicken or turkey skin, whole milk, and butter  Limit saturated fat to less than 7% of your total daily calories  Limit saturated fat to less than 6% if you have heart disease or are at increased risk for it  · Trans fat  is found in packaged foods, such as potato chips and cookies  It is also in hard margarine, some fried foods, and shortening  Avoid trans fats as much as possible    Heart healthy foods and drinks to include:  Ask your dietitian or healthcare provider how many servings to have from each of the following food groups:  · Grains:      ¨ Whole-wheat breads, cereals, and pastas, and brown rice    ¨ Low-fat, low-sodium crackers and chips    · Vegetables:      ¨ Broccoli, green beans, green peas, and spinach    ¨ Collards, kale, and lima beans    ¨ Carrots, sweet potatoes, tomatoes, and peppers    ¨ Canned vegetables with no salt added    · Fruits:      ¨ Bananas, peaches, pears, and pineapple    ¨ Grapes, raisins, and dates    ¨ Oranges, tangerines, grapefruit, orange juice, and grapefruit juice    ¨ Apricots, mangoes, melons, and papaya    ¨ Raspberries and strawberries    ¨ Canned fruit with no added sugar    · Low-fat dairy products:      ¨ Nonfat (skim) milk, 1% milk, and low-fat almond, cashew, or soy milks fortified with calcium    ¨ Low-fat cheese, regular or frozen yogurt, and cottage cheese    · Meats and proteins , such as lean cuts of beef and pork (loin, leg, round), skinless chicken and turkey, legumes, soy products, egg whites, and nuts  Foods and drinks to limit or avoid:  Ask your dietitian or healthcare provider about these and other foods that are high in unhealthy fat, sodium, and sugar:  · Snack or packaged foods , such as frozen dinners, cookies, macaroni and cheese, and cereals with more than 300 mg of sodium per serving    · Canned or dry mixes  for cakes, soups, sauces, or gravies    · Vegetables with added sodium , such as instant potatoes, vegetables with added sauces, or regular canned vegetables    · Other foods high in sodium , such as ketchup, barbecue sauce, salad dressing, pickles, olives, soy sauce, and miso    · High-fat dairy foods  such as whole or 2% milk, cream cheese, or sour cream, and cheeses     · High-fat protein foods  such as high-fat cuts of beef (T-bone steaks, ribs), chicken or turkey with skin, and organ meats, such as liver    · Cured or smoked meats , such as hot dogs, lancaster, and sausage    · Unhealthy fats and oils , such as butter, stick margarine, shortening, and cooking oils such as coconut or palm oil    · Food and drinks high in sugar , such as soft drinks (soda), sports drinks, sweetened tea, candy, cake, cookies, pies, and doughnuts  Other diet guidelines to follow:   · Eat more foods containing omega-3 fats  Eat fish high in omega-3 fats at least 2 times a week  · Limit alcohol  Too much alcohol can damage your heart and raise your blood pressure  Women should limit alcohol to 1 drink a day  Men should limit alcohol to 2 drinks a day  A drink of alcohol is 12 ounces of beer, 5 ounces of wine, or 1½ ounces of liquor  · Choose low-sodium foods  High-sodium foods can lead to high blood pressure  Add little or no salt to food you prepare  Use herbs and spices in place of salt  · Eat more fiber  to help lower cholesterol levels  Eat at least 5 servings of fruits and vegetables each day  Eat 3 ounces of whole-grain foods each day  Legumes (beans) are also a good source of fiber  Lifestyle guidelines:   · Do not smoke    Nicotine and other chemicals in cigarettes and cigars can cause lung and heart damage  Ask your healthcare provider for information if you currently smoke and need help to quit  E-cigarettes or smokeless tobacco still contain nicotine  Talk to your healthcare provider before you use these products  · Exercise regularly  to help you maintain a healthy weight and improve your blood pressure and cholesterol levels  Ask your healthcare provider about the best exercise plan for you  Do not start an exercise program without asking your healthcare provider  Follow up with your healthcare provider as directed:  Write down your questions so you remember to ask them during your visits  © 2017 [facility] [facility] Information is for End User's use only and may not be sold, redistributed or otherwise used for commercial purposes  All illustrations and images included in CareNotes® are the copyrighted property of A D A M [employer] Inc  or [employer]  The above information is an  only  It is not intended as medical advice for individual conditions or treatments  Talk to your doctor, nurse or pharmacist before following any medical regimen to see if it is safe and effective for you  Obesity   WHAT YOU SHOULD KNOW:   Obesity is a medical condition caused by too much body fat  Your caregiver will use your height and weight to calculate your body mass index (BMI)  You are obese if your BMI is greater than 30  Obesity increases your risk of medical conditions such as diabetes, heart disease, and certain types of cancer  Obesity is treated with lifestyle changes, and sometimes medicine or surgery  The goal of treatment is to help you lose weight so your health will improve  Even a small decrease in BMI can reduce the risk of health problems caused by obesity     INSTRUCTIONS:   Follow up with your primary healthcare provider [facility]) as directed:  Ask your PHP to help you set a weight loss goal  Keep appointments with your PHP so that he can support you as you lose weight  Write down your questions so you remember to ask them during your visits  How to be successful in losing weight:   · Set small, realistic goals  An example of a small goal is to walk for 20 minutes 5 days a week  Do not try to change everything at once  · Tell friends, family members, and coworkers about your goals  and ask for their support  Ask a friend to lose weight with you, or join a weight-loss support group  · Identify foods or triggers that may cause you to overeat , and find ways to avoid them  Remove tempting high-calorie foods from your home and workplace  Place a bowl of fresh fruit on your kitchen counter  If stress causes you to eat, then find other ways to cope with stress  · Keep a diary to track what you eat and drink, and your daily calorie intake  Also write down how many minutes of physical activity you do each day  Weigh yourself once a week and record it in your diary  Eating changes: You will need to eat 500 to 1000 fewer calories each day than you currently eat to lose 1 to 2 pounds a week  The following changes will help you cut calories:  · Eat smaller portions  Use small plates, no larger than 9 inches in diameter  Fill your plate half full of fruits and vegetables  Measure your food using measuring cups until you know what a serving size looks like  · Eat 3 meals and 1 or 2 snacks each day  Plan your meals in advance  [place]ita [place] and eat at home most of the time  Eat slowly  · Eat fruits and vegetables at every meal   They are low in calories and high in fiber, which makes you feel full  Do not add butter, margarine, or cream sauce to vegetables  Use herbs to season steamed vegetables  · Eat less fat and fewer fried foods  Eat more baked or grilled chicken and fish  These protein sources are lower in calories and fat than red meat  Limit fast food  Dress your salads with olive oil and vinegar instead of bottled dressing  · Limit the amount of sugar you eat  Do not drink sugary beverages  Limit alcohol  Activity changes:  Physical activity is good for your body in many ways  It helps you burn calories and build strong muscles  It decreases stress and depression, and gives you an overall sense of well-being  It can also help you sleep better  Talk to your PHP before you begin an exercise program   · Exercise for at least 30 minutes 5 days a week  Start slowly  Set aside time each day for physical activity that you enjoy and that is convenient for you  It is best to do both weight training and an activity that increases your heart rate, such as walking, bicycling, or swimming  · Find ways to be more active  Do yard work and housecleaning  Walk up the stairs instead of using elevators  Spend your leisure time going to events that require walking, such as outdoor festivals and art fairs  This extra physical activity can help you lose weight and keep it off  Contact your PHP if:   · You have symptoms of gallbladder or liver disease, such as pain in your upper abdomen  · You have knee or hip pain and discomfort while walking  · You have symptoms of diabetes, such as intense hunger and thirst, and frequent urination  · You have symptoms of sleep apnea, such as snoring or daytime sleepiness  · You have questions or concerns about your condition or care  Return to the emergency department if:   · You have a severe headache, confusion, or difficulty speaking  · You have weakness on one side of your body  · You have chest pain, sweating, or shortness of breath  © 2014 [address] is for End User's use only and may not be sold, redistributed or otherwise used for commercial purposes  All illustrations and images included in CareNotes® are the copyrighted property of [employer]  or [employer]  The above information is an  only   It is not intended as medical advice for individual conditions or treatments  Talk to your doctor, nurse or pharmacist before following any medical regimen to see if it is safe and effective for you

## 2019-08-12 ENCOUNTER — HOSPITAL ENCOUNTER (EMERGENCY)
Facility: HOSPITAL | Age: 44
Discharge: HOME | End: 2019-08-12
Payer: COMMERCIAL

## 2019-08-12 ENCOUNTER — APPOINTMENT (EMERGENCY)
Dept: CT IMAGING | Facility: HOSPITAL | Age: 44
End: 2019-08-12
Payer: COMMERCIAL

## 2019-08-12 VITALS
WEIGHT: 315 LBS | HEART RATE: 81 BPM | RESPIRATION RATE: 18 BRPM | OXYGEN SATURATION: 98 % | BODY MASS INDEX: 40.43 KG/M2 | HEIGHT: 74 IN | DIASTOLIC BLOOD PRESSURE: 77 MMHG | TEMPERATURE: 98.1 F | SYSTOLIC BLOOD PRESSURE: 150 MMHG

## 2019-08-12 DIAGNOSIS — M54.50 LOWER BACK PAIN: Primary | ICD-10-CM

## 2019-08-12 PROCEDURE — 99284 EMERGENCY DEPT VISIT MOD MDM: CPT

## 2019-08-12 PROCEDURE — 72131 CT LUMBAR SPINE W/O DYE: CPT

## 2019-08-12 PROCEDURE — 96372 THER/PROPH/DIAG INJ SC/IM: CPT

## 2019-08-12 RX ORDER — KETOROLAC TROMETHAMINE 30 MG/ML
60 INJECTION, SOLUTION INTRAMUSCULAR; INTRAVENOUS ONCE
Status: COMPLETED | OUTPATIENT
Start: 2019-08-12 | End: 2019-08-12

## 2019-08-12 RX ORDER — LORAZEPAM 2 MG/ML
1 INJECTION INTRAMUSCULAR ONCE
Status: COMPLETED | OUTPATIENT
Start: 2019-08-12 | End: 2019-08-12

## 2019-08-12 RX ORDER — GABAPENTIN 300 MG/1
300 CAPSULE ORAL 3 TIMES DAILY
Qty: 15 CAPSULE | Refills: 0 | Status: SHIPPED | OUTPATIENT
Start: 2019-08-12 | End: 2020-10-06 | Stop reason: SDUPTHER

## 2019-08-12 RX ORDER — BACLOFEN 20 MG/1
20 TABLET ORAL 3 TIMES DAILY
Qty: 12 TABLET | Refills: 0 | Status: SHIPPED | OUTPATIENT
Start: 2019-08-12 | End: 2021-03-23

## 2019-08-12 RX ADMIN — KETOROLAC TROMETHAMINE 60 MG: 30 INJECTION, SOLUTION INTRAMUSCULAR; INTRAVENOUS at 15:56

## 2019-08-12 RX ADMIN — LORAZEPAM 1 MG: 2 INJECTION INTRAMUSCULAR; INTRAVENOUS at 15:56
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
## 2019-08-13 ENCOUNTER — HOSPITAL ENCOUNTER (EMERGENCY)
Facility: HOSPITAL | Age: 44
Discharge: HOME | End: 2019-08-14
Attending: INTERNAL MEDICINE | Admitting: INTERNAL MEDICINE
Payer: COMMERCIAL

## 2019-08-13 DIAGNOSIS — R11.10 VOMITING: Primary | ICD-10-CM

## 2019-08-13 DIAGNOSIS — K52.9 GASTROENTERITIS: ICD-10-CM

## 2019-08-13 DIAGNOSIS — M46.1 SACROILIITIS (HCC): ICD-10-CM

## 2019-08-13 PROCEDURE — 99284 EMERGENCY DEPT VISIT MOD MDM: CPT

## 2019-08-13 RX ORDER — ONDANSETRON 2 MG/ML
4 INJECTION INTRAMUSCULAR; INTRAVENOUS ONCE
Status: COMPLETED | OUTPATIENT
Start: 2019-08-14 | End: 2019-08-14

## 2019-08-13 RX ORDER — KETOROLAC TROMETHAMINE 30 MG/ML
30 INJECTION, SOLUTION INTRAMUSCULAR; INTRAVENOUS ONCE
Status: COMPLETED | OUTPATIENT
Start: 2019-08-14 | End: 2019-08-14

## 2019-08-14 ENCOUNTER — APPOINTMENT (EMERGENCY)
Dept: CT IMAGING | Facility: HOSPITAL | Age: 44
End: 2019-08-14
Payer: COMMERCIAL

## 2019-08-14 VITALS
WEIGHT: 315 LBS | RESPIRATION RATE: 20 BRPM | OXYGEN SATURATION: 99 % | BODY MASS INDEX: 40.43 KG/M2 | DIASTOLIC BLOOD PRESSURE: 98 MMHG | SYSTOLIC BLOOD PRESSURE: 144 MMHG | TEMPERATURE: 97.4 F | HEART RATE: 79 BPM | HEIGHT: 74 IN

## 2019-08-14 LAB
ALBUMIN SERPL BCP-MCNC: 4.8 G/DL (ref 3.5–5.7)
ALP SERPL-CCNC: 52 U/L (ref 40–150)
ALT SERPL W P-5'-P-CCNC: 45 U/L (ref 7–52)
ANION GAP SERPL CALCULATED.3IONS-SCNC: 11 MMOL/L (ref 4–13)
APTT PPP: 33 SECONDS (ref 23–37)
AST SERPL W P-5'-P-CCNC: 21 U/L (ref 13–39)
BACTERIA UR QL AUTO: ABNORMAL /HPF
BASOPHILS # BLD AUTO: 0 THOUSANDS/ΜL (ref 0–0.1)
BASOPHILS NFR BLD AUTO: 0 % (ref 0–2)
BILIRUB SERPL-MCNC: 0.9 MG/DL (ref 0.2–1)
BILIRUB UR QL STRIP: NEGATIVE
BUN SERPL-MCNC: 18 MG/DL (ref 7–25)
CALCIUM SERPL-MCNC: 10.2 MG/DL (ref 8.6–10.5)
CHLORIDE SERPL-SCNC: 98 MMOL/L (ref 98–107)
CLARITY UR: CLEAR
CO2 SERPL-SCNC: 29 MMOL/L (ref 21–31)
COLOR UR: YELLOW
CREAT SERPL-MCNC: 0.92 MG/DL (ref 0.7–1.3)
EOSINOPHIL # BLD AUTO: 0.1 THOUSAND/ΜL (ref 0–0.61)
EOSINOPHIL NFR BLD AUTO: 1 % (ref 0–5)
ERYTHROCYTE [DISTWIDTH] IN BLOOD BY AUTOMATED COUNT: 14.7 % (ref 11.5–14.5)
GFR SERPL CREATININE-BSD FRML MDRD: 99 ML/MIN/1.73SQ M
GLUCOSE SERPL-MCNC: 199 MG/DL (ref 65–99)
GLUCOSE UR STRIP-MCNC: NEGATIVE MG/DL
HCT VFR BLD AUTO: 42.2 % (ref 42–47)
HGB BLD-MCNC: 14.3 G/DL (ref 14–18)
HGB UR QL STRIP.AUTO: NEGATIVE
INR PPP: 1.1 (ref 0.9–1.5)
KETONES UR STRIP-MCNC: NEGATIVE MG/DL
LACTATE SERPL-SCNC: 1.8 MMOL/L (ref 0.5–2)
LEUKOCYTE ESTERASE UR QL STRIP: NEGATIVE
LYMPHOCYTES # BLD AUTO: 1.3 THOUSANDS/ΜL (ref 0.6–4.47)
LYMPHOCYTES NFR BLD AUTO: 19 % (ref 21–51)
MCH RBC QN AUTO: 26.7 PG (ref 26–34)
MCHC RBC AUTO-ENTMCNC: 34 G/DL (ref 31–37)
MCV RBC AUTO: 79 FL (ref 81–99)
MONOCYTES # BLD AUTO: 0.4 THOUSAND/ΜL (ref 0.17–1.22)
MONOCYTES NFR BLD AUTO: 6 % (ref 2–12)
MUCOUS THREADS UR QL AUTO: ABNORMAL
NEUTROPHILS # BLD AUTO: 4.8 THOUSANDS/ΜL (ref 1.4–6.5)
NEUTS SEG NFR BLD AUTO: 74 % (ref 42–75)
NITRITE UR QL STRIP: NEGATIVE
NON-SQ EPI CELLS URNS QL MICRO: ABNORMAL /HPF
PH UR STRIP.AUTO: 6 [PH]
PLATELET # BLD AUTO: 189 THOUSANDS/UL (ref 149–390)
PMV BLD AUTO: 8.6 FL (ref 8.6–11.7)
POTASSIUM SERPL-SCNC: 3.8 MMOL/L (ref 3.5–5.5)
PROT SERPL-MCNC: 7.7 G/DL (ref 6.4–8.9)
PROT UR STRIP-MCNC: ABNORMAL MG/DL
PROTHROMBIN TIME: 12.8 SECONDS (ref 10.2–13)
RBC # BLD AUTO: 5.37 MILLION/UL (ref 4.3–5.9)
RBC #/AREA URNS AUTO: ABNORMAL /HPF
SODIUM SERPL-SCNC: 138 MMOL/L (ref 134–143)
SP GR UR STRIP.AUTO: 1.02 (ref 1–1.03)
UROBILINOGEN UR QL STRIP.AUTO: 0.2 E.U./DL
WBC # BLD AUTO: 6.6 THOUSAND/UL (ref 4.8–10.8)
WBC #/AREA URNS AUTO: ABNORMAL /HPF

## 2019-08-14 PROCEDURE — 96375 TX/PRO/DX INJ NEW DRUG ADDON: CPT

## 2019-08-14 PROCEDURE — 85025 COMPLETE CBC W/AUTO DIFF WBC: CPT | Performed by: INTERNAL MEDICINE

## 2019-08-14 PROCEDURE — 96361 HYDRATE IV INFUSION ADD-ON: CPT

## 2019-08-14 PROCEDURE — 74177 CT ABD & PELVIS W/CONTRAST: CPT

## 2019-08-14 PROCEDURE — 85610 PROTHROMBIN TIME: CPT | Performed by: INTERNAL MEDICINE

## 2019-08-14 PROCEDURE — 87040 BLOOD CULTURE FOR BACTERIA: CPT | Performed by: INTERNAL MEDICINE

## 2019-08-14 PROCEDURE — 85730 THROMBOPLASTIN TIME PARTIAL: CPT | Performed by: INTERNAL MEDICINE

## 2019-08-14 PROCEDURE — 96374 THER/PROPH/DIAG INJ IV PUSH: CPT

## 2019-08-14 PROCEDURE — 36415 COLL VENOUS BLD VENIPUNCTURE: CPT | Performed by: INTERNAL MEDICINE

## 2019-08-14 PROCEDURE — 83605 ASSAY OF LACTIC ACID: CPT | Performed by: INTERNAL MEDICINE

## 2019-08-14 PROCEDURE — 81003 URINALYSIS AUTO W/O SCOPE: CPT | Performed by: INTERNAL MEDICINE

## 2019-08-14 PROCEDURE — 80053 COMPREHEN METABOLIC PANEL: CPT | Performed by: INTERNAL MEDICINE

## 2019-08-14 PROCEDURE — 81001 URINALYSIS AUTO W/SCOPE: CPT | Performed by: INTERNAL MEDICINE

## 2019-08-14 RX ORDER — ONDANSETRON 4 MG/1
4 TABLET, FILM COATED ORAL EVERY 6 HOURS
Qty: 12 TABLET | Refills: 0 | Status: SHIPPED | OUTPATIENT
Start: 2019-08-14 | End: 2021-03-23

## 2019-08-14 RX ORDER — ONDANSETRON 4 MG/1
4 TABLET, ORALLY DISINTEGRATING ORAL ONCE
Status: COMPLETED | OUTPATIENT
Start: 2019-08-14 | End: 2019-08-14

## 2019-08-14 RX ORDER — TRAMADOL HYDROCHLORIDE 50 MG/1
50 TABLET ORAL EVERY 4 HOURS
Qty: 20 TABLET | Refills: 0 | Status: SHIPPED | OUTPATIENT
Start: 2019-08-14 | End: 2019-08-18

## 2019-08-14 RX ORDER — FENTANYL CITRATE 50 UG/ML
50 INJECTION, SOLUTION INTRAMUSCULAR; INTRAVENOUS ONCE
Status: COMPLETED | OUTPATIENT
Start: 2019-08-14 | End: 2019-08-14

## 2019-08-14 RX ADMIN — IOHEXOL 100 ML: 350 INJECTION, SOLUTION INTRAVENOUS at 01:53

## 2019-08-14 RX ADMIN — KETOROLAC TROMETHAMINE 30 MG: 30 INJECTION, SOLUTION INTRAMUSCULAR; INTRAVENOUS at 00:29

## 2019-08-14 RX ADMIN — ONDANSETRON 4 MG: 4 TABLET, ORALLY DISINTEGRATING ORAL at 03:10

## 2019-08-14 RX ADMIN — FENTANYL CITRATE 50 MCG: 50 INJECTION INTRAMUSCULAR; INTRAVENOUS at 01:07

## 2019-08-14 RX ADMIN — SODIUM CHLORIDE 1000 ML: 0.9 INJECTION, SOLUTION INTRAVENOUS at 00:29

## 2019-08-14 RX ADMIN — ONDANSETRON 4 MG: 2 INJECTION INTRAMUSCULAR; INTRAVENOUS at 00:29
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
## 2019-08-19 LAB
BACTERIA BLD CULT: NORMAL
BACTERIA BLD CULT: NORMAL

## 2019-08-21 DIAGNOSIS — E78.2 MIXED HYPERLIPIDEMIA: ICD-10-CM

## 2019-08-21 DIAGNOSIS — M10.9 GOUT, UNSPECIFIED CAUSE, UNSPECIFIED CHRONICITY, UNSPECIFIED SITE: ICD-10-CM

## 2019-08-21 RX ORDER — ATORVASTATIN CALCIUM 80 MG/1
80 TABLET, FILM COATED ORAL DAILY
Qty: 90 TABLET | Refills: 0 | Status: SHIPPED | OUTPATIENT
Start: 2019-08-21 | End: 2019-11-19 | Stop reason: SDUPTHER

## 2019-08-21 RX ORDER — ALLOPURINOL 300 MG/1
300 TABLET ORAL DAILY
Qty: 90 TABLET | Refills: 0 | Status: SHIPPED | OUTPATIENT
Start: 2019-08-21 | End: 2019-11-19 | Stop reason: SDUPTHER

## 2019-08-23 LAB — HBA1C MFR BLD HPLC: 7.2 %

## 2019-08-26 NOTE — RESULT ENCOUNTER NOTE
I dont want him to waste the trulicity  Have him follow up in office for A1C check  In office one week before he is out of trulicity  Try and be very diligent with diet and weight loss as well

## 2019-08-27 ENCOUNTER — TELEPHONE (OUTPATIENT)
Dept: FAMILY MEDICINE CLINIC | Facility: CLINIC | Age: 44
End: 2019-08-27

## 2019-08-27 NOTE — TELEPHONE ENCOUNTER
----- Message from Jameel Nava, [address] sent at 8/26/2019  5:23 PM EDT -----             I dont want him to waste the trulicity  Have him follow up in office for A1C check  In office one week before he is out of trulicity  Try and be very diligent with diet and weight loss as well

## 2019-08-28 DIAGNOSIS — E78.2 ELEVATED TRIGLYCERIDES WITH HIGH CHOLESTEROL: Primary | ICD-10-CM

## 2019-08-28 LAB
ALBUMIN SERPL-MCNC: 4.7 G/DL (ref 3.5–5.5)
ALBUMIN/GLOB SERPL: 2.1 {RATIO} (ref 1.2–2.2)
ALP SERPL-CCNC: 66 IU/L (ref 39–117)
ALT SERPL-CCNC: 43 IU/L (ref 0–44)
AST SERPL-CCNC: 25 IU/L (ref 0–40)
BILIRUB SERPL-MCNC: 0.8 MG/DL (ref 0–1.2)
BUN SERPL-MCNC: 20 MG/DL (ref 6–24)
BUN/CREAT SERPL: 20 (ref 9–20)
CALCIUM SERPL-MCNC: 9.6 MG/DL (ref 8.7–10.2)
CHLORIDE SERPL-SCNC: 99 MMOL/L (ref 96–106)
CHOLEST SERPL-MCNC: 149 MG/DL (ref 100–199)
CO2 SERPL-SCNC: 24 MMOL/L (ref 20–29)
CREAT SERPL-MCNC: 0.99 MG/DL (ref 0.76–1.27)
EST. AVERAGE GLUCOSE BLD GHB EST-MCNC: 160 MG/DL
GLOBULIN SER-MCNC: 2.2 G/DL (ref 1.5–4.5)
GLUCOSE SERPL-MCNC: 155 MG/DL (ref 65–99)
HBA1C MFR BLD: 7.2 % (ref 4.8–5.6)
HDLC SERPL-MCNC: 24 MG/DL
LDLC SERPL CALC-MCNC: ABNORMAL MG/DL (ref 0–99)
LDLC SERPL DIRECT ASSAY-MCNC: 58 MG/DL (ref 0–99)
POTASSIUM SERPL-SCNC: 4.6 MMOL/L (ref 3.5–5.2)
PROT SERPL-MCNC: 6.9 G/DL (ref 6–8.5)
SL AMB EGFR AFRICAN AMERICAN: 105 ML/MIN/1.73
SL AMB EGFR NON AFRICAN AMERICAN: 91 ML/MIN/1.73
SL AMB VLDL CHOLESTEROL CALC: ABNORMAL MG/DL (ref 5–40)
SODIUM SERPL-SCNC: 140 MMOL/L (ref 134–144)
TRIGL SERPL-MCNC: 492 MG/DL (ref 0–149)

## 2019-09-18 DIAGNOSIS — R52 PAIN: ICD-10-CM

## 2019-09-18 RX ORDER — IBUPROFEN 600 MG/1
TABLET ORAL
Qty: 90 TABLET | Refills: 12 | Status: SHIPPED | OUTPATIENT
Start: 2019-09-18 | End: 2020-10-26

## 2019-09-22 DIAGNOSIS — E78.1 HIGH TRIGLYCERIDES: ICD-10-CM

## 2019-09-22 RX ORDER — FENOFIBRIC ACID 135 MG/1
CAPSULE, DELAYED RELEASE ORAL
Qty: 90 CAPSULE | Refills: 4 | Status: SHIPPED | OUTPATIENT
Start: 2019-09-22 | End: 2020-10-06 | Stop reason: SDUPTHER

## 2019-11-19 DIAGNOSIS — M10.9 GOUT, UNSPECIFIED CAUSE, UNSPECIFIED CHRONICITY, UNSPECIFIED SITE: ICD-10-CM

## 2019-11-19 DIAGNOSIS — E78.2 MIXED HYPERLIPIDEMIA: ICD-10-CM

## 2019-11-19 RX ORDER — ATORVASTATIN CALCIUM 80 MG/1
TABLET, FILM COATED ORAL
Qty: 90 TABLET | Refills: 4 | Status: SHIPPED | OUTPATIENT
Start: 2019-11-19 | End: 2020-10-06 | Stop reason: SDUPTHER

## 2019-11-19 RX ORDER — ALLOPURINOL 300 MG/1
TABLET ORAL
Qty: 90 TABLET | Refills: 4 | Status: SHIPPED | OUTPATIENT
Start: 2019-11-19 | End: 2020-10-06 | Stop reason: SDUPTHER

## 2020-01-22 DIAGNOSIS — E11.9 TYPE 2 DIABETES MELLITUS WITHOUT COMPLICATION, WITHOUT LONG-TERM CURRENT USE OF INSULIN (HCC): ICD-10-CM

## 2020-01-22 RX ORDER — METFORMIN HYDROCHLORIDE 500 MG/1
TABLET, EXTENDED RELEASE ORAL
Qty: 360 TABLET | Refills: 0 | Status: SHIPPED | OUTPATIENT
Start: 2020-01-22 | End: 2020-01-22

## 2020-01-22 RX ORDER — METFORMIN HYDROCHLORIDE 500 MG/1
TABLET, EXTENDED RELEASE ORAL
Qty: 360 TABLET | Refills: 0 | Status: SHIPPED | OUTPATIENT
Start: 2020-01-22 | End: 2020-04-28 | Stop reason: SDUPTHER

## 2020-03-08 DIAGNOSIS — I10 ESSENTIAL HYPERTENSION: ICD-10-CM

## 2020-03-09 RX ORDER — LOSARTAN POTASSIUM AND HYDROCHLOROTHIAZIDE 25; 100 MG/1; MG/1
TABLET ORAL
Qty: 90 TABLET | Refills: 3 | Status: SHIPPED | OUTPATIENT
Start: 2020-03-09 | End: 2020-10-06 | Stop reason: SDUPTHER

## 2020-04-28 DIAGNOSIS — E11.9 TYPE 2 DIABETES MELLITUS WITHOUT COMPLICATION, WITHOUT LONG-TERM CURRENT USE OF INSULIN (HCC): ICD-10-CM

## 2020-04-28 RX ORDER — METFORMIN HYDROCHLORIDE 500 MG/1
500 TABLET, EXTENDED RELEASE ORAL 2 TIMES DAILY WITH MEALS
Qty: 180 TABLET | Refills: 3 | Status: SHIPPED | OUTPATIENT
Start: 2020-04-28 | End: 2020-05-15 | Stop reason: SDUPTHER

## 2020-05-15 DIAGNOSIS — E11.9 TYPE 2 DIABETES MELLITUS WITHOUT COMPLICATION, WITHOUT LONG-TERM CURRENT USE OF INSULIN (HCC): ICD-10-CM

## 2020-05-15 RX ORDER — METFORMIN HYDROCHLORIDE 500 MG/1
500 TABLET, EXTENDED RELEASE ORAL 2 TIMES DAILY WITH MEALS
Qty: 180 TABLET | Refills: 3 | Status: SHIPPED | OUTPATIENT
Start: 2020-05-15 | End: 2020-05-18 | Stop reason: SDUPTHER

## 2020-05-18 DIAGNOSIS — E11.9 TYPE 2 DIABETES MELLITUS WITHOUT COMPLICATION, WITHOUT LONG-TERM CURRENT USE OF INSULIN (HCC): ICD-10-CM

## 2020-05-18 RX ORDER — METFORMIN HYDROCHLORIDE 500 MG/1
500 TABLET, EXTENDED RELEASE ORAL 2 TIMES DAILY WITH MEALS
Qty: 180 TABLET | Refills: 3 | Status: SHIPPED | OUTPATIENT
Start: 2020-05-18 | End: 2020-06-09

## 2020-05-19 DIAGNOSIS — E11.9 TYPE 2 DIABETES MELLITUS WITHOUT COMPLICATION, WITHOUT LONG-TERM CURRENT USE OF INSULIN (HCC): ICD-10-CM

## 2020-05-19 RX ORDER — DULAGLUTIDE 1.5 MG/.5ML
INJECTION, SOLUTION SUBCUTANEOUS
Qty: 6 ML | Refills: 3 | Status: SHIPPED | OUTPATIENT
Start: 2020-05-19 | End: 2021-04-20

## 2020-06-09 DIAGNOSIS — E11.9 TYPE 2 DIABETES MELLITUS WITHOUT COMPLICATION, WITHOUT LONG-TERM CURRENT USE OF INSULIN (HCC): ICD-10-CM

## 2020-06-09 RX ORDER — METFORMIN HYDROCHLORIDE 500 MG/1
1000 TABLET, EXTENDED RELEASE ORAL 2 TIMES DAILY WITH MEALS
Qty: 360 TABLET | Refills: 1 | Status: SHIPPED | OUTPATIENT
Start: 2020-06-09 | End: 2020-10-06 | Stop reason: SDUPTHER

## 2020-07-06 DIAGNOSIS — I10 ESSENTIAL HYPERTENSION: ICD-10-CM

## 2020-07-06 RX ORDER — AMLODIPINE BESYLATE 5 MG/1
TABLET ORAL
Qty: 90 TABLET | Refills: 0 | Status: SHIPPED | OUTPATIENT
Start: 2020-07-06 | End: 2020-10-06 | Stop reason: SDUPTHER

## 2020-07-14 ENCOUNTER — TELEPHONE (OUTPATIENT)
Dept: FAMILY MEDICINE CLINIC | Facility: CLINIC | Age: 45
End: 2020-07-14

## 2020-07-14 DIAGNOSIS — G47.30 SLEEP APNEA, UNSPECIFIED TYPE: Primary | ICD-10-CM

## 2020-07-14 NOTE — TELEPHONE ENCOUNTER
Pura Harmon called the office saying that he has been trying to get a CPAP from [facility] since November and still has not been able to get it  He would like to try and get this through [facility] in [place]  He needs his sleep study, order for CPAP and a demographics sheet sent over to [facility] at F) [phone] (P) [phone]  I can not find any information regarding sleep apnea  I called Pura Harmon back and left a message for him to let us know where and when he had this sleep study   awaiting a call back

## 2020-07-17 NOTE — TELEPHONE ENCOUNTER
I called patient again today, he informed me that he has had a CPAP for about 15 years  He had a sleep study done through [facility] many years ago and he stated that his CPAP pressure is 12  I did not see any info on Care Everywhere for this  Are we able to do anything to help him get a new CPAP?   He has not been seen here since 7/2019

## 2020-07-18 NOTE — TELEPHONE ENCOUNTER
He must see Pulmonary for all CPAP orders   They most likely will request a re titration study since it has been more than 5 years since a study was done

## 2020-07-27 ENCOUNTER — TELEPHONE (OUTPATIENT)
Dept: FAMILY MEDICINE CLINIC | Facility: CLINIC | Age: 45
End: 2020-07-27

## 2020-07-27 NOTE — TELEPHONE ENCOUNTER
[facility]    I made his an appt with her on 8/4/2020

## 2020-07-27 NOTE — TELEPHONE ENCOUNTER
Spoke with [facility] health   They need a new script for his CPAP Machine    He would like us to send them a new script

## 2020-09-23 ENCOUNTER — TELEPHONE (OUTPATIENT)
Dept: FAMILY MEDICINE CLINIC | Facility: CLINIC | Age: 45
End: 2020-09-23

## 2020-09-23 NOTE — TELEPHONE ENCOUNTER
LM for patient to call back - he is due for a diabetic check up and labs - no refills will be given until seen

## 2020-10-04 DIAGNOSIS — I10 ESSENTIAL HYPERTENSION: ICD-10-CM

## 2020-10-05 RX ORDER — AMLODIPINE BESYLATE 5 MG/1
TABLET ORAL
Qty: 90 TABLET | Refills: 3 | OUTPATIENT
Start: 2020-10-05

## 2020-10-06 ENCOUNTER — OFFICE VISIT (OUTPATIENT)
Dept: FAMILY MEDICINE CLINIC | Facility: CLINIC | Age: 45
End: 2020-10-06
Payer: COMMERCIAL

## 2020-10-06 VITALS
WEIGHT: 315 LBS | RESPIRATION RATE: 18 BRPM | HEART RATE: 82 BPM | TEMPERATURE: 98.7 F | BODY MASS INDEX: 40.43 KG/M2 | OXYGEN SATURATION: 97 % | SYSTOLIC BLOOD PRESSURE: 136 MMHG | DIASTOLIC BLOOD PRESSURE: 82 MMHG | HEIGHT: 74 IN

## 2020-10-06 DIAGNOSIS — M54.50 LOWER BACK PAIN: ICD-10-CM

## 2020-10-06 DIAGNOSIS — Z23 NEED FOR INFLUENZA VACCINATION: ICD-10-CM

## 2020-10-06 DIAGNOSIS — E11.9 TYPE 2 DIABETES MELLITUS WITHOUT COMPLICATION, WITHOUT LONG-TERM CURRENT USE OF INSULIN (HCC): ICD-10-CM

## 2020-10-06 DIAGNOSIS — E78.1 HIGH TRIGLYCERIDES: ICD-10-CM

## 2020-10-06 DIAGNOSIS — E66.01 MORBID OBESITY WITH BMI OF 40.0-44.9, ADULT (HCC): ICD-10-CM

## 2020-10-06 DIAGNOSIS — M10.9 GOUT, UNSPECIFIED CAUSE, UNSPECIFIED CHRONICITY, UNSPECIFIED SITE: ICD-10-CM

## 2020-10-06 DIAGNOSIS — I10 ESSENTIAL HYPERTENSION: Primary | ICD-10-CM

## 2020-10-06 DIAGNOSIS — R71.8 MICROCYTOSIS: ICD-10-CM

## 2020-10-06 DIAGNOSIS — K76.0 FATTY LIVER: ICD-10-CM

## 2020-10-06 DIAGNOSIS — M25.562 ACUTE PAIN OF LEFT KNEE: ICD-10-CM

## 2020-10-06 DIAGNOSIS — E78.2 MIXED HYPERLIPIDEMIA: ICD-10-CM

## 2020-10-06 PROCEDURE — 1036F TOBACCO NON-USER: CPT | Performed by: NURSE PRACTITIONER

## 2020-10-06 PROCEDURE — 90682 RIV4 VACC RECOMBINANT DNA IM: CPT | Performed by: NURSE PRACTITIONER

## 2020-10-06 PROCEDURE — 3725F SCREEN DEPRESSION PERFORMED: CPT | Performed by: NURSE PRACTITIONER

## 2020-10-06 PROCEDURE — 99214 OFFICE O/P EST MOD 30 MIN: CPT | Performed by: NURSE PRACTITIONER

## 2020-10-06 PROCEDURE — 90471 IMMUNIZATION ADMIN: CPT | Performed by: NURSE PRACTITIONER

## 2020-10-06 PROCEDURE — 3079F DIAST BP 80-89 MM HG: CPT | Performed by: NURSE PRACTITIONER

## 2020-10-06 RX ORDER — FENOFIBRIC ACID 135 MG/1
1 CAPSULE, DELAYED RELEASE ORAL DAILY
Qty: 90 CAPSULE | Refills: 3 | Status: SHIPPED | OUTPATIENT
Start: 2020-10-06 | End: 2020-10-06 | Stop reason: SDUPTHER

## 2020-10-06 RX ORDER — ATORVASTATIN CALCIUM 80 MG/1
80 TABLET, FILM COATED ORAL DAILY
Qty: 90 TABLET | Refills: 3 | Status: SHIPPED | OUTPATIENT
Start: 2020-10-06 | End: 2021-11-01

## 2020-10-06 RX ORDER — ALLOPURINOL 300 MG/1
300 TABLET ORAL DAILY
Qty: 90 TABLET | Refills: 3 | Status: SHIPPED | OUTPATIENT
Start: 2020-10-06 | End: 2021-10-01

## 2020-10-06 RX ORDER — METFORMIN HYDROCHLORIDE 500 MG/1
1000 TABLET, EXTENDED RELEASE ORAL 2 TIMES DAILY WITH MEALS
Qty: 360 TABLET | Refills: 1 | Status: SHIPPED | OUTPATIENT
Start: 2020-10-06 | End: 2020-10-06 | Stop reason: SDUPTHER

## 2020-10-06 RX ORDER — AMLODIPINE BESYLATE 5 MG/1
5 TABLET ORAL DAILY
Qty: 90 TABLET | Refills: 3 | Status: SHIPPED | OUTPATIENT
Start: 2020-10-06 | End: 2021-10-01

## 2020-10-06 RX ORDER — AMLODIPINE BESYLATE 5 MG/1
5 TABLET ORAL DAILY
Qty: 90 TABLET | Refills: 3 | Status: SHIPPED | OUTPATIENT
Start: 2020-10-06 | End: 2020-10-06 | Stop reason: SDUPTHER

## 2020-10-06 RX ORDER — FENOFIBRIC ACID 135 MG/1
1 CAPSULE, DELAYED RELEASE ORAL DAILY
Qty: 90 CAPSULE | Refills: 3 | Status: SHIPPED | OUTPATIENT
Start: 2020-10-06 | End: 2022-03-15 | Stop reason: SDUPTHER

## 2020-10-06 RX ORDER — METFORMIN HYDROCHLORIDE 500 MG/1
1000 TABLET, EXTENDED RELEASE ORAL 2 TIMES DAILY WITH MEALS
Qty: 90 TABLET | Refills: 3 | Status: SHIPPED | OUTPATIENT
Start: 2020-10-06 | End: 2020-10-07 | Stop reason: SDUPTHER

## 2020-10-06 RX ORDER — LOSARTAN POTASSIUM AND HYDROCHLOROTHIAZIDE 25; 100 MG/1; MG/1
1 TABLET ORAL DAILY
Qty: 90 TABLET | Refills: 3 | Status: SHIPPED | OUTPATIENT
Start: 2020-10-06 | End: 2020-10-06 | Stop reason: SDUPTHER

## 2020-10-06 RX ORDER — ATORVASTATIN CALCIUM 80 MG/1
80 TABLET, FILM COATED ORAL DAILY
Qty: 90 TABLET | Refills: 3 | Status: SHIPPED | OUTPATIENT
Start: 2020-10-06 | End: 2020-10-06 | Stop reason: SDUPTHER

## 2020-10-06 RX ORDER — LOSARTAN POTASSIUM AND HYDROCHLOROTHIAZIDE 25; 100 MG/1; MG/1
1 TABLET ORAL DAILY
Qty: 90 TABLET | Refills: 3 | Status: SHIPPED | OUTPATIENT
Start: 2020-10-06 | End: 2021-11-05

## 2020-10-06 RX ORDER — GABAPENTIN 300 MG/1
300 CAPSULE ORAL 3 TIMES DAILY
Qty: 90 CAPSULE | Refills: 0 | Status: SHIPPED | OUTPATIENT
Start: 2020-10-06 | End: 2020-10-06 | Stop reason: SDUPTHER

## 2020-10-06 RX ORDER — ALLOPURINOL 300 MG/1
300 TABLET ORAL DAILY
Qty: 90 TABLET | Refills: 3 | Status: SHIPPED | OUTPATIENT
Start: 2020-10-06 | End: 2020-10-06 | Stop reason: SDUPTHER

## 2020-10-06 RX ORDER — GABAPENTIN 300 MG/1
300 CAPSULE ORAL 3 TIMES DAILY
Qty: 90 CAPSULE | Refills: 0 | Status: SHIPPED | OUTPATIENT
Start: 2020-10-06 | End: 2021-03-23

## 2020-10-07 DIAGNOSIS — E11.9 TYPE 2 DIABETES MELLITUS WITHOUT COMPLICATION, WITHOUT LONG-TERM CURRENT USE OF INSULIN (HCC): ICD-10-CM

## 2020-10-07 RX ORDER — METFORMIN HYDROCHLORIDE 500 MG/1
1000 TABLET, EXTENDED RELEASE ORAL 2 TIMES DAILY WITH MEALS
Qty: 180 TABLET | Refills: 3 | Status: SHIPPED | OUTPATIENT
Start: 2020-10-07 | End: 2021-06-07

## 2020-10-24 DIAGNOSIS — R52 PAIN: ICD-10-CM

## 2020-10-26 RX ORDER — IBUPROFEN 600 MG/1
TABLET ORAL
Qty: 90 TABLET | Refills: 11 | Status: SHIPPED | OUTPATIENT
Start: 2020-10-26 | End: 2021-11-08

## 2021-01-05 ENCOUNTER — VBI (OUTPATIENT)
Dept: ADMINISTRATIVE | Facility: OTHER | Age: 46
End: 2021-01-05

## 2021-01-05 NOTE — TELEPHONE ENCOUNTER
01/05/21 3:39 PM     See documentation in the [employer] SmartForm       Charles Hickman, [address] [facility] [place]

## 2021-01-12 LAB
LEFT EYE DIABETIC RETINOPATHY: NORMAL
RIGHT EYE DIABETIC RETINOPATHY: NORMAL

## 2021-02-11 DIAGNOSIS — M54.10 ACUTE LOW BACK PAIN WITH RADICULAR SYMPTOMS, DURATION LESS THAN 6 WEEKS: ICD-10-CM

## 2021-02-11 RX ORDER — CYCLOBENZAPRINE HCL 10 MG
10 TABLET ORAL 3 TIMES DAILY
Qty: 270 TABLET | Refills: 0 | Status: SHIPPED | OUTPATIENT
Start: 2021-02-11 | End: 2021-08-26

## 2021-02-12 ENCOUNTER — DOCUMENTATION (OUTPATIENT)
Dept: FAMILY MEDICINE CLINIC | Facility: CLINIC | Age: 46
End: 2021-02-12

## 2021-03-02 LAB — HBA1C MFR BLD HPLC: 8.6 %

## 2021-03-05 LAB
ALBUMIN SERPL-MCNC: 4.4 G/DL (ref 4–5)
ALBUMIN/GLOB SERPL: 1.8 {RATIO} (ref 1.2–2.2)
ALP SERPL-CCNC: 79 IU/L (ref 39–117)
ALT SERPL-CCNC: 37 IU/L (ref 0–44)
AST SERPL-CCNC: 16 IU/L (ref 0–40)
BASOPHILS # BLD AUTO: 0 X10E3/UL (ref 0–0.2)
BASOPHILS NFR BLD AUTO: 1 %
BILIRUB SERPL-MCNC: 0.4 MG/DL (ref 0–1.2)
BUN SERPL-MCNC: 20 MG/DL (ref 6–24)
BUN/CREAT SERPL: 18 (ref 9–20)
CALCIUM SERPL-MCNC: 9.6 MG/DL (ref 8.7–10.2)
CHLORIDE SERPL-SCNC: 101 MMOL/L (ref 96–106)
CHOLEST SERPL-MCNC: 155 MG/DL (ref 100–199)
CO2 SERPL-SCNC: 25 MMOL/L (ref 20–29)
CREAT SERPL-MCNC: 1.09 MG/DL (ref 0.76–1.27)
EOSINOPHIL # BLD AUTO: 0.1 X10E3/UL (ref 0–0.4)
EOSINOPHIL NFR BLD AUTO: 3 %
ERYTHROCYTE [DISTWIDTH] IN BLOOD BY AUTOMATED COUNT: 14.6 % (ref 11.6–15.4)
GLOBULIN SER-MCNC: 2.4 G/DL (ref 1.5–4.5)
GLUCOSE SERPL-MCNC: 194 MG/DL (ref 65–99)
HBA1C MFR BLD: 8.6 % (ref 4.8–5.6)
HCT VFR BLD AUTO: 41.2 % (ref 37.5–51)
HDLC SERPL-MCNC: 24 MG/DL
HGB BLD-MCNC: 13.6 G/DL (ref 13–17.7)
IMM GRANULOCYTES # BLD: 0 X10E3/UL (ref 0–0.1)
IMM GRANULOCYTES NFR BLD: 0 %
LDLC SERPL CALC-MCNC: 46 MG/DL (ref 0–99)
LYMPHOCYTES # BLD AUTO: 1.8 X10E3/UL (ref 0.7–3.1)
LYMPHOCYTES NFR BLD AUTO: 37 %
MCH RBC QN AUTO: 25.8 PG (ref 26.6–33)
MCHC RBC AUTO-ENTMCNC: 33 G/DL (ref 31.5–35.7)
MCV RBC AUTO: 78 FL (ref 79–97)
MONOCYTES # BLD AUTO: 0.4 X10E3/UL (ref 0.1–0.9)
MONOCYTES NFR BLD AUTO: 7 %
NEUTROPHILS # BLD AUTO: 2.5 X10E3/UL (ref 1.4–7)
NEUTROPHILS NFR BLD AUTO: 52 %
PLATELET # BLD AUTO: 200 X10E3/UL (ref 150–450)
POTASSIUM SERPL-SCNC: 4.6 MMOL/L (ref 3.5–5.2)
PROT SERPL-MCNC: 6.8 G/DL (ref 6–8.5)
RBC # BLD AUTO: 5.28 X10E6/UL (ref 4.14–5.8)
SL AMB EGFR AFRICAN AMERICAN: 93 ML/MIN/1.73
SL AMB EGFR NON AFRICAN AMERICAN: 80 ML/MIN/1.73
SL AMB VLDL CHOLESTEROL CALC: 85 MG/DL (ref 5–40)
SODIUM SERPL-SCNC: 139 MMOL/L (ref 134–144)
TRIGL SERPL-MCNC: 591 MG/DL (ref 0–149)
WBC # BLD AUTO: 4.9 X10E3/UL (ref 3.4–10.8)

## 2021-03-05 PROCEDURE — 3052F HG A1C>EQUAL 8.0%<EQUAL 9.0%: CPT | Performed by: FAMILY MEDICINE

## 2021-03-22 PROBLEM — Z28.21 TETANUS, DIPHTHERIA, AND ACELLULAR PERTUSSIS (TDAP) VACCINATION DECLINED: Status: RESOLVED | Noted: 2018-12-18 | Resolved: 2021-03-22

## 2021-03-22 PROBLEM — M25.562 ACUTE PAIN OF LEFT KNEE: Status: RESOLVED | Noted: 2020-10-06 | Resolved: 2021-03-22

## 2021-03-22 PROBLEM — IMO0002 UNCONTROLLED DIABETES MELLITUS: Status: RESOLVED | Noted: 2018-01-04 | Resolved: 2021-03-22

## 2021-03-22 PROBLEM — Z28.21 INFLUENZA VACCINATION DECLINED: Status: RESOLVED | Noted: 2018-12-18 | Resolved: 2021-03-22

## 2021-03-22 PROBLEM — M1A.0790: Status: RESOLVED | Noted: 2017-01-31 | Resolved: 2021-03-22

## 2021-03-22 PROBLEM — Z28.21 PNEUMOCOCCAL VACCINATION DECLINED: Status: RESOLVED | Noted: 2018-12-18 | Resolved: 2021-03-22

## 2021-03-22 PROBLEM — Z23 NEED FOR INFLUENZA VACCINATION: Status: RESOLVED | Noted: 2020-10-06 | Resolved: 2021-03-22

## 2021-03-22 NOTE — PROGRESS NOTES
Zaira Campuzano 1975 male MRN: [id number]      ASSESSMENT/PLAN  Problem List Items Addressed This Visit        Endocrine    Type 2 diabetes mellitus without complication, without long-term current use of insulin ([facility] ) - Primary    Relevant Medications    Empagliflozin (Jardiance) 10 MG TABS    Other Relevant Orders    Ambulatory referral to Diabetic Education       Cardiovascular and Mediastinum    Hypertension       Other    Gout    Mixed hyperlipidemia    Relevant Medications    Icosapent Ethyl (Vascepa) 0 5 g CAPS    Morbid obesity with BMI of 40 0-44 9, adult ([facility] )        DM: Above goal  Will add Jardiance -- discussed possible ADRs including dry mouth, polyuria, urinary infections  Will also refer to DM Education  F/u in 4 weeks to monitor, to call if not tolerating prior  Eye exam UTD -- will request records from [facility]   HTN: Within goal  HLD: TG still above goal  Add Vascepa  Discussed diet modifications   BMI 41/NAFLD: DM Nutrition referral as above   Gout: Well controlled with Allopurinol       Future Appointments   Date Time Provider Liset Fernandez   4/27/2021  8:40 AM DO [facility]          SUBJECTIVE  CC: Follow-up (review blood work   needs form filled out for insurance) and Diabetes      HPI:  Zaira Campuzano is a 50 y o  male who presents for chronic follow up as below  DM: A1c 8 6%; Home sugars none   HTN: Cr 1 09/GFR 80  HLD: Lipids: Total 155, LDL 46, HDL 24, ; LFTs WNL   BMI 41/NAFLD: No sugary drinks, cooks mostly at home, but eats a lot of junk; not nearly as active as he used to be (previously did construction), but does run around with his dogs   Gout: No recent flare ups     Review of Systems   Eyes: Negative for visual disturbance  Respiratory: Negative for shortness of breath  Cardiovascular: Negative for chest pain, palpitations and leg swelling  Gastrointestinal: Negative for constipation and diarrhea     Endocrine: Negative for polydipsia and polyuria  Musculoskeletal: Negative for arthralgias  Neurological: Positive for tremors  Negative for dizziness and headaches (intermittent)  Historical Information   The patient history was reviewed and updated as follows:    Past Medical History:   Diagnosis Date    CPAP (continuous positive airway pressure) dependence     Diabetes ([facility] )     HTN (hypertension)     Hyperlipidemia     Kidney stone     RIGHT    Morbid obesity with BMI of 40 0-44 9, adult ([facility] )     SANCHO on CPAP      Past Surgical History:   Procedure Laterality Date    GANGLION CYST EXCISION Left 2019    Procedure: WRIST GANGLION CYST EXCISION;  Surgeon: Janine Guy MD;  [facility];  Service: Orthopedics    NH CYSTO/URETERO W/LITHOTRIPSY &INDWELL STENT INSRT Right 2017    Procedure: CYSTOSCOPY URETEROSCOPY WITH LITHOTRIPSY HOLMIUM LASER, RETROGRADE PYELOGRAM AND INSERTION STENT URETERAL;  Surgeon: Aj Platt MD;  [facility];  Service: Urology    Tony ESWL Left 2017    Procedure: Katey Mandujanoo SHOCKWAVE (ESWL);   Surgeon: Aj Platt MD;  [facility];  Service: Urology    NH WRIST Lobo Bailer LIG Left 2019    Procedure: ENDOSCOPIC CARPAL TUNNEL RELEASE;  Surgeon: Janine Guy MD;  [facility];  Service: Orthopedics     Family History   Problem Relation Age of Onset    Bone cancer Father     Prostate cancer Maternal Grandfather     Cancer Family     Hypertension Family       Social History   Social History     Substance and Sexual Activity   Alcohol Use Yes    Frequency: Monthly or less    Comment: socially     Social History     Substance and Sexual Activity   Drug Use No     Social History     Tobacco Use   Smoking Status Former Smoker    Quit date:     Years since quittin 2   Smokeless Tobacco Former User   Tobacco Comment    Former Smoker as per allscripts       Medications:     Current Outpatient Medications:     allopurinol (ZYLOPRIM) 300 mg tablet, Take 1 tablet (300 mg total) by mouth daily, Disp: 90 tablet, Rfl: 3    amLODIPine (NORVASC) 5 mg tablet, Take 1 tablet (5 mg total) by mouth daily, Disp: 90 tablet, Rfl: 3    atorvastatin (LIPITOR) 80 mg tablet, Take 1 tablet (80 mg total) by mouth daily, Disp: 90 tablet, Rfl: 3    Blood Glucose Monitoring Suppl (ONE TOUCH ULTRA 2) w/Device KIT, 1 kit by Does not apply route daily, Disp: , Rfl:     Choline Fenofibrate (Fenofibric Acid) 135 MG CPDR, Take 1 capsule (135 mg total) by mouth daily, Disp: 90 capsule, Rfl: 3    cyclobenzaprine (FLEXERIL) 10 mg tablet, Take 1 tablet (10 mg total) by mouth 3 (three) times a day, Disp: 270 tablet, Rfl: 0    FLUCELVAX 0 5 ML CATRACHO, , Disp: , Rfl:     glucose blood test strip, by In Vitro route Twice daily, Disp: , Rfl:     ibuprofen (MOTRIN) 600 mg tablet, TAKE 1 TABLET EVERY 8 HOURS, Disp: 90 tablet, Rfl: 11    indomethacin (INDOCIN SR) 75 mg CR capsule, Take 1 capsule (75 mg total) by mouth daily as needed (PRN for flare-ups), Disp: 90 capsule, Rfl: 0    losartan-hydrochlorothiazide (HYZAAR) 100-25 MG per tablet, Take 1 tablet by mouth daily, Disp: 90 tablet, Rfl: 3    metFORMIN (GLUCOPHAGE-XR) 500 mg 24 hr tablet, Take 2 tablets (1,000 mg total) by mouth 2 (two) times a day with meals, Disp: 180 tablet, Rfl: 3    ONETOUCH DELICA LANCETS 97H MISC, 1 Units by Does not apply route daily, Disp: , Rfl:     TRULICITY 1 5 CA/0 7QM SOPN, INJECT 0 5 ML (1 5 MG) UNDER THE SKIN ONCE A WEEK, Disp: 6 mL, Rfl: 3    Empagliflozin (Jardiance) 10 MG TABS, Take 1 tablet (10 mg total) by mouth every morning, Disp: 30 tablet, Rfl: 1    Icosapent Ethyl (Vascepa) 0 5 g CAPS, Take 0 5 g by mouth daily, Disp: 30 capsule, Rfl: 1  No Known Allergies    OBJECTIVE    Vitals:   Vitals:    03/23/21 0838   BP: 130/80   BP Location: Left arm   Patient Position: Sitting   Cuff Size: Adult   Pulse: 84   Resp: 18   Temp: (!) 97 1 °F (36 2 °C)   SpO2: 95%   Weight: (!) 152 kg (335 lb 12 8 oz)   Height: 6' 2" (1 88 m)           Physical Exam  Vitals signs and nursing note reviewed  Constitutional:       General: He is not in acute distress  Appearance: Normal appearance  HENT:      Head: Normocephalic and atraumatic  Right Ear: Tympanic membrane and ear canal normal       Left Ear: Tympanic membrane and ear canal normal    Eyes:      Conjunctiva/sclera: Conjunctivae normal    Cardiovascular:      Rate and Rhythm: Normal rate and regular rhythm  Pulmonary:      Effort: Pulmonary effort is normal  No respiratory distress  Breath sounds: Normal breath sounds  Abdominal:      General: Bowel sounds are normal  There is no distension  Palpations: Abdomen is soft  Tenderness: There is no abdominal tenderness  Musculoskeletal:      Right lower leg: No edema  Left lower leg: No edema  Lymphadenopathy:      Cervical: No cervical adenopathy  Skin:     General: Skin is warm and dry  Neurological:      General: No focal deficit present  Mental Status: He is alert     Psychiatric:         Mood and Affect: Mood normal                     Donita Elizabeth DO  [address] [facility]   3/23/2021  9:02 AM

## 2021-03-23 ENCOUNTER — OFFICE VISIT (OUTPATIENT)
Dept: FAMILY MEDICINE CLINIC | Facility: CLINIC | Age: 46
End: 2021-03-23
Payer: COMMERCIAL

## 2021-03-23 VITALS
SYSTOLIC BLOOD PRESSURE: 130 MMHG | BODY MASS INDEX: 40.43 KG/M2 | RESPIRATION RATE: 18 BRPM | HEIGHT: 74 IN | WEIGHT: 315 LBS | TEMPERATURE: 97.1 F | DIASTOLIC BLOOD PRESSURE: 80 MMHG | HEART RATE: 84 BPM | OXYGEN SATURATION: 95 %

## 2021-03-23 DIAGNOSIS — E66.01 MORBID OBESITY WITH BMI OF 40.0-44.9, ADULT (HCC): ICD-10-CM

## 2021-03-23 DIAGNOSIS — M10.9 GOUT, UNSPECIFIED CAUSE, UNSPECIFIED CHRONICITY, UNSPECIFIED SITE: ICD-10-CM

## 2021-03-23 DIAGNOSIS — E11.9 TYPE 2 DIABETES MELLITUS WITHOUT COMPLICATION, WITHOUT LONG-TERM CURRENT USE OF INSULIN (HCC): Primary | ICD-10-CM

## 2021-03-23 DIAGNOSIS — I10 ESSENTIAL HYPERTENSION: ICD-10-CM

## 2021-03-23 DIAGNOSIS — E78.2 MIXED HYPERLIPIDEMIA: ICD-10-CM

## 2021-03-23 PROCEDURE — 1036F TOBACCO NON-USER: CPT | Performed by: FAMILY MEDICINE

## 2021-03-23 PROCEDURE — 3079F DIAST BP 80-89 MM HG: CPT | Performed by: FAMILY MEDICINE

## 2021-03-23 PROCEDURE — 3008F BODY MASS INDEX DOCD: CPT | Performed by: FAMILY MEDICINE

## 2021-03-23 PROCEDURE — 3075F SYST BP GE 130 - 139MM HG: CPT | Performed by: FAMILY MEDICINE

## 2021-03-23 PROCEDURE — 99214 OFFICE O/P EST MOD 30 MIN: CPT | Performed by: FAMILY MEDICINE

## 2021-03-23 RX ORDER — EMPAGLIFLOZIN 10 MG/1
10 TABLET, FILM COATED ORAL EVERY MORNING
Qty: 30 TABLET | Refills: 1 | Status: SHIPPED | OUTPATIENT
Start: 2021-03-23 | End: 2021-06-09

## 2021-03-23 RX ORDER — ICOSAPENT ETHYL 500 MG/1
0.5 CAPSULE ORAL DAILY
Qty: 30 CAPSULE | Refills: 1 | Status: SHIPPED | OUTPATIENT
Start: 2021-03-23 | End: 2021-03-25 | Stop reason: SDUPTHER

## 2021-03-25 DIAGNOSIS — E78.2 MIXED HYPERLIPIDEMIA: ICD-10-CM

## 2021-03-25 RX ORDER — ICOSAPENT ETHYL 500 MG/1
0.5 CAPSULE ORAL DAILY
Qty: 90 CAPSULE | Refills: 1 | Status: SHIPPED | OUTPATIENT
Start: 2021-03-25 | End: 2021-03-31 | Stop reason: SDUPTHER

## 2021-03-30 DIAGNOSIS — Z23 ENCOUNTER FOR IMMUNIZATION: ICD-10-CM

## 2021-03-31 DIAGNOSIS — E78.2 MIXED HYPERLIPIDEMIA: ICD-10-CM

## 2021-04-01 RX ORDER — ICOSAPENT ETHYL 500 MG/1
2 CAPSULE ORAL 2 TIMES DAILY
Qty: 360 CAPSULE | Refills: 1 | Status: SHIPPED | OUTPATIENT
Start: 2021-04-01 | End: 2021-04-05 | Stop reason: SDUPTHER

## 2021-04-05 ENCOUNTER — IMMUNIZATIONS (OUTPATIENT)
Dept: FAMILY MEDICINE CLINIC | Facility: HOSPITAL | Age: 46
End: 2021-04-05

## 2021-04-05 DIAGNOSIS — Z23 ENCOUNTER FOR IMMUNIZATION: Primary | ICD-10-CM

## 2021-04-05 DIAGNOSIS — E78.2 MIXED HYPERLIPIDEMIA: ICD-10-CM

## 2021-04-05 PROCEDURE — 0001A SARS-COV-2 / COVID-19 MRNA VACCINE (PFIZER-BIONTECH) 30 MCG: CPT

## 2021-04-05 PROCEDURE — 91300 SARS-COV-2 / COVID-19 MRNA VACCINE (PFIZER-BIONTECH) 30 MCG: CPT

## 2021-04-05 RX ORDER — ICOSAPENT ETHYL 500 MG/1
2 CAPSULE ORAL 2 TIMES DAILY
Qty: 360 CAPSULE | Refills: 1 | Status: SHIPPED | OUTPATIENT
Start: 2021-04-05 | End: 2021-04-07

## 2021-04-07 DIAGNOSIS — E78.2 MIXED HYPERLIPIDEMIA: ICD-10-CM

## 2021-04-07 RX ORDER — ICOSAPENT ETHYL 1000 MG/1
1 CAPSULE ORAL 2 TIMES DAILY
Qty: 360 CAPSULE | Refills: 1 | Status: SHIPPED | OUTPATIENT
Start: 2021-04-07 | End: 2022-07-26

## 2021-04-13 ENCOUNTER — OFFICE VISIT (OUTPATIENT)
Dept: DIABETES SERVICES | Facility: CLINIC | Age: 46
End: 2021-04-13
Payer: COMMERCIAL

## 2021-04-13 VITALS — HEIGHT: 74 IN | WEIGHT: 315 LBS | BODY MASS INDEX: 40.43 KG/M2

## 2021-04-13 DIAGNOSIS — E11.9 TYPE 2 DIABETES MELLITUS WITHOUT COMPLICATION, WITHOUT LONG-TERM CURRENT USE OF INSULIN (HCC): Primary | ICD-10-CM

## 2021-04-13 PROCEDURE — 3008F BODY MASS INDEX DOCD: CPT | Performed by: FAMILY MEDICINE

## 2021-04-13 PROCEDURE — 97802 MEDICAL NUTRITION INDIV IN: CPT | Performed by: DIETITIAN, REGISTERED

## 2021-04-13 NOTE — PATIENT INSTRUCTIONS
1  Follow meal plan, especially with meal timing and avoid eating excess carbs  2  Start exercising, walk or use elliptical 150 minutes per week  3   Follow up in 2 months

## 2021-04-13 NOTE — PROGRESS NOTES
Medical Nutrition Therapy        Assessment    Visit Type: Initial visit    Chief complaint Pt has had diabetes for about 2 years  He is not SMBG, has a new meter and needs strips  He cannot tell me exactly what meter he has  Advised him to call his PCP office with his meter in hand to request the exact strips he needs  HPI: Eden diet history reveals that he now eats 3 meals daily and has cut out a lot of pastries and excess portions  He packs lunch for work almost daily  His diet includes vegetablles, fruits, lean proteins, and some whole grains  Currently meals range from 10 to 90 grams of carbohydrate  Explained basic pathophysiology of diabetes and impact of diet on blood glucose levels  Together we discussed what foods contain CHO, reading a food label, and serving sizes  Used the portion booklet to teach Azucena Aragonnox more about food groups and basic carbohydrate counting  Created an individualized meal plan for Alma Lennox with 3 meals and 2 snacks providing 30-60 g carb per meal and 30 g carb per snack  This plan will help promote weight loss  Put together sample meals for Todd's reference  Alma Lennox demonstrated good understanding, Azucena Peacex will call with questions or for more education      Ht Readings from Last 1 Encounters:   03/23/21 6' 2" (1 88 m)     Wt Readings from Last 2 Encounters:   03/23/21 (!) 152 kg (335 lb 12 8 oz)   10/06/20 (!) 147 kg (323 lb)     Weight Change: Yes -7#    Medical Diagnosis/ICD10: E11 9    Barriers to Learning: no barriers    Do you follow any special diet presently?: Yes - trying to stay away from pastries and ice cream  Who shops: patient and spouse  Who cooks: patient and spouse    Food Log: Completed via the method of food recall    Breakfast: 9:30, now having a protein bar, used to have nothing (low sugar), may have a cup of tea  Morning Snack:none  Lunch:12-2, brings in salad - lettuce, homemade chicken salad, dried cranberries, cucumbers, tomatoes, no dressinf or leftovers  Afternoon Snack: may have a piece of fruit like kimani or pear  Dinner:7-9 pm, 2 cups ham and bean soup, grilled cheese sandwich on multigrain bread  Evening Snack:2 pack PB [place] granola bar, cup of almond chocolate milk  Beverages: water, herbal tea, occasional sweet iced tea  Eating out/Take out:sometimes on Thurs or Fri night and out to eat on Saturdays  Exercise not really, does own yard work    Calorie needs 1910kcals/day Carbs: 30-60 g/meal, 15-30 g/snack         Nutrition Diagnosis:  Food and nutrition related knowledge deficit  related to Lack of prior exposure to accurate nutrition related information as evidenced by [employer] inaccurate or incomplete information    Intervention: carbohydrate counting, increased plant based foods, meal timing, meal planning, individualized meal plan, monitoring portion control and exercise guidelines     Treatment Goals: Patient understands education and recommendations, Patient will count carbohydrates and Patient will exercise    Monitoring and evaluation:    Term code indicator  FH 1 6 3 Carbohydrate Intake Criteria: Follow meal plan  Term code indicator  CH 2 2 Treatments/Therapy/Alternative Medicine Criteria: Exercise 30 minutes daily/150 minutes per week cardio plus 2 days strength training    Patients Response to Instruction:  Comprehensiongood  Motivationgood  Expected Compliancegood    Thank you for coming to the [facility] for education today  Please feel free to call with any questions or concerns      Smith Ceja  [phone] [facility] [id number] [phone]

## 2021-04-20 DIAGNOSIS — E11.9 TYPE 2 DIABETES MELLITUS WITHOUT COMPLICATION, WITHOUT LONG-TERM CURRENT USE OF INSULIN (HCC): ICD-10-CM

## 2021-04-20 RX ORDER — DULAGLUTIDE 1.5 MG/.5ML
INJECTION, SOLUTION SUBCUTANEOUS
Qty: 6 ML | Refills: 3 | Status: SHIPPED | OUTPATIENT
Start: 2021-04-20 | End: 2022-03-15 | Stop reason: SDUPTHER

## 2021-04-26 ENCOUNTER — IMMUNIZATIONS (OUTPATIENT)
Dept: FAMILY MEDICINE CLINIC | Facility: HOSPITAL | Age: 46
End: 2021-04-26

## 2021-04-26 DIAGNOSIS — Z23 ENCOUNTER FOR IMMUNIZATION: Primary | ICD-10-CM

## 2021-04-26 PROCEDURE — 91300 SARS-COV-2 / COVID-19 MRNA VACCINE (PFIZER-BIONTECH) 30 MCG: CPT

## 2021-04-26 PROCEDURE — 0002A SARS-COV-2 / COVID-19 MRNA VACCINE (PFIZER-BIONTECH) 30 MCG: CPT

## 2021-06-06 DIAGNOSIS — E11.9 TYPE 2 DIABETES MELLITUS WITHOUT COMPLICATION, WITHOUT LONG-TERM CURRENT USE OF INSULIN (HCC): ICD-10-CM

## 2021-06-07 RX ORDER — METFORMIN HYDROCHLORIDE 500 MG/1
TABLET, EXTENDED RELEASE ORAL
Qty: 360 TABLET | Refills: 3 | Status: SHIPPED | OUTPATIENT
Start: 2021-06-07 | End: 2022-03-15 | Stop reason: SDUPTHER

## 2021-06-09 DIAGNOSIS — E11.9 TYPE 2 DIABETES MELLITUS WITHOUT COMPLICATION, WITHOUT LONG-TERM CURRENT USE OF INSULIN (HCC): ICD-10-CM

## 2021-06-09 RX ORDER — EMPAGLIFLOZIN 10 MG/1
TABLET, FILM COATED ORAL
Qty: 90 TABLET | Refills: 3 | Status: SHIPPED | OUTPATIENT
Start: 2021-06-09 | End: 2022-03-15 | Stop reason: SDUPTHER

## 2021-09-26 ENCOUNTER — APPOINTMENT (EMERGENCY)
Dept: ULTRASOUND IMAGING | Facility: HOSPITAL | Age: 46
End: 2021-09-26
Payer: COMMERCIAL

## 2021-09-26 ENCOUNTER — APPOINTMENT (EMERGENCY)
Dept: CT IMAGING | Facility: HOSPITAL | Age: 46
End: 2021-09-26
Payer: COMMERCIAL

## 2021-09-26 ENCOUNTER — HOSPITAL ENCOUNTER (EMERGENCY)
Facility: HOSPITAL | Age: 46
Discharge: HOME | End: 2021-09-26
Attending: EMERGENCY MEDICINE | Admitting: EMERGENCY MEDICINE
Payer: COMMERCIAL

## 2021-09-26 VITALS
SYSTOLIC BLOOD PRESSURE: 145 MMHG | BODY MASS INDEX: 40.43 KG/M2 | HEIGHT: 74 IN | WEIGHT: 315 LBS | TEMPERATURE: 97.1 F | OXYGEN SATURATION: 97 % | RESPIRATION RATE: 16 BRPM | HEART RATE: 72 BPM | DIASTOLIC BLOOD PRESSURE: 85 MMHG

## 2021-09-26 DIAGNOSIS — M54.50 LOWER BACK PAIN: Primary | ICD-10-CM

## 2021-09-26 LAB
ALBUMIN SERPL BCP-MCNC: 4.7 G/DL (ref 3.5–5.7)
ALP SERPL-CCNC: 62 U/L (ref 40–150)
ALT SERPL W P-5'-P-CCNC: 51 U/L (ref 7–52)
ANION GAP SERPL CALCULATED.3IONS-SCNC: 12 MMOL/L (ref 4–13)
AST SERPL W P-5'-P-CCNC: 27 U/L (ref 13–39)
BASOPHILS # BLD AUTO: 0.1 THOUSANDS/ΜL (ref 0–0.1)
BASOPHILS NFR BLD AUTO: 1 % (ref 0–2)
BILIRUB SERPL-MCNC: 1.1 MG/DL (ref 0.2–1)
BILIRUB UR QL STRIP: NEGATIVE
BUN SERPL-MCNC: 17 MG/DL (ref 7–25)
CALCIUM SERPL-MCNC: 9.7 MG/DL (ref 8.6–10.5)
CHLORIDE SERPL-SCNC: 100 MMOL/L (ref 98–107)
CLARITY UR: CLEAR
CO2 SERPL-SCNC: 24 MMOL/L (ref 21–31)
COLOR UR: YELLOW
CREAT SERPL-MCNC: 0.88 MG/DL (ref 0.7–1.3)
EOSINOPHIL # BLD AUTO: 0 THOUSAND/ΜL (ref 0–0.61)
EOSINOPHIL NFR BLD AUTO: 1 % (ref 0–5)
ERYTHROCYTE [DISTWIDTH] IN BLOOD BY AUTOMATED COUNT: 15 % (ref 11.5–14.5)
GFR SERPL CREATININE-BSD FRML MDRD: 102 ML/MIN/1.73SQ M
GLUCOSE SERPL-MCNC: 133 MG/DL (ref 65–99)
GLUCOSE UR STRIP-MCNC: ABNORMAL MG/DL
HCT VFR BLD AUTO: 44.1 % (ref 42–47)
HGB BLD-MCNC: 15.2 G/DL (ref 14–18)
HGB UR QL STRIP.AUTO: NEGATIVE
KETONES UR STRIP-MCNC: NEGATIVE MG/DL
LEUKOCYTE ESTERASE UR QL STRIP: NEGATIVE
LIPASE SERPL-CCNC: 165 U/L (ref 11–82)
LYMPHOCYTES # BLD AUTO: 1.7 THOUSANDS/ΜL (ref 0.6–4.47)
LYMPHOCYTES NFR BLD AUTO: 22 % (ref 21–51)
MCH RBC QN AUTO: 26.4 PG (ref 26–34)
MCHC RBC AUTO-ENTMCNC: 34.4 G/DL (ref 31–37)
MCV RBC AUTO: 77 FL (ref 81–99)
MONOCYTES # BLD AUTO: 0.4 THOUSAND/ΜL (ref 0.17–1.22)
MONOCYTES NFR BLD AUTO: 5 % (ref 2–12)
NEUTROPHILS # BLD AUTO: 5.6 THOUSANDS/ΜL (ref 1.4–6.5)
NEUTS SEG NFR BLD AUTO: 72 % (ref 42–75)
NITRITE UR QL STRIP: NEGATIVE
PH UR STRIP.AUTO: 5 [PH]
PLATELET # BLD AUTO: 189 THOUSANDS/UL (ref 149–390)
PMV BLD AUTO: 8 FL (ref 8.6–11.7)
POTASSIUM SERPL-SCNC: 4.1 MMOL/L (ref 3.5–5.5)
PROT SERPL-MCNC: 7.5 G/DL (ref 6.4–8.9)
PROT UR STRIP-MCNC: NEGATIVE MG/DL
RBC # BLD AUTO: 5.75 MILLION/UL (ref 4.3–5.9)
SODIUM SERPL-SCNC: 136 MMOL/L (ref 134–143)
SP GR UR STRIP.AUTO: 1.02 (ref 1–1.03)
UROBILINOGEN UR QL STRIP.AUTO: 0.2 E.U./DL
WBC # BLD AUTO: 7.7 THOUSAND/UL (ref 4.8–10.8)

## 2021-09-26 PROCEDURE — 81003 URINALYSIS AUTO W/O SCOPE: CPT | Performed by: EMERGENCY MEDICINE

## 2021-09-26 PROCEDURE — 99285 EMERGENCY DEPT VISIT HI MDM: CPT | Performed by: EMERGENCY MEDICINE

## 2021-09-26 PROCEDURE — 96375 TX/PRO/DX INJ NEW DRUG ADDON: CPT

## 2021-09-26 PROCEDURE — 99284 EMERGENCY DEPT VISIT MOD MDM: CPT

## 2021-09-26 PROCEDURE — 36415 COLL VENOUS BLD VENIPUNCTURE: CPT | Performed by: EMERGENCY MEDICINE

## 2021-09-26 PROCEDURE — 85025 COMPLETE CBC W/AUTO DIFF WBC: CPT | Performed by: EMERGENCY MEDICINE

## 2021-09-26 PROCEDURE — G1004 CDSM NDSC: HCPCS

## 2021-09-26 PROCEDURE — 80053 COMPREHEN METABOLIC PANEL: CPT | Performed by: EMERGENCY MEDICINE

## 2021-09-26 PROCEDURE — 96361 HYDRATE IV INFUSION ADD-ON: CPT

## 2021-09-26 PROCEDURE — 76870 US EXAM SCROTUM: CPT

## 2021-09-26 PROCEDURE — 74176 CT ABD & PELVIS W/O CONTRAST: CPT

## 2021-09-26 PROCEDURE — 96374 THER/PROPH/DIAG INJ IV PUSH: CPT

## 2021-09-26 PROCEDURE — 83690 ASSAY OF LIPASE: CPT | Performed by: EMERGENCY MEDICINE

## 2021-09-26 RX ORDER — OXYCODONE HYDROCHLORIDE AND ACETAMINOPHEN 5; 325 MG/1; MG/1
1 TABLET ORAL ONCE
Status: COMPLETED | OUTPATIENT
Start: 2021-09-26 | End: 2021-09-26

## 2021-09-26 RX ORDER — KETOROLAC TROMETHAMINE 30 MG/ML
15 INJECTION, SOLUTION INTRAMUSCULAR; INTRAVENOUS ONCE
Status: COMPLETED | OUTPATIENT
Start: 2021-09-26 | End: 2021-09-26

## 2021-09-26 RX ORDER — PREDNISONE 20 MG/1
40 TABLET ORAL DAILY
Qty: 6 TABLET | Refills: 0 | Status: SHIPPED | OUTPATIENT
Start: 2021-09-26 | End: 2021-09-26 | Stop reason: SDUPTHER

## 2021-09-26 RX ORDER — PREDNISONE 20 MG/1
40 TABLET ORAL ONCE
Status: COMPLETED | OUTPATIENT
Start: 2021-09-26 | End: 2021-09-26

## 2021-09-26 RX ORDER — HYDROMORPHONE HCL/PF 1 MG/ML
0.5 SYRINGE (ML) INJECTION ONCE
Status: COMPLETED | OUTPATIENT
Start: 2021-09-26 | End: 2021-09-26

## 2021-09-26 RX ORDER — PREDNISONE 20 MG/1
40 TABLET ORAL DAILY
Qty: 6 TABLET | Refills: 0 | Status: SHIPPED | OUTPATIENT
Start: 2021-09-26 | End: 2022-03-08

## 2021-09-26 RX ADMIN — PREDNISONE 40 MG: 20 TABLET ORAL at 17:40

## 2021-09-26 RX ADMIN — SODIUM CHLORIDE 1000 ML: 0.9 INJECTION, SOLUTION INTRAVENOUS at 15:26

## 2021-09-26 RX ADMIN — KETOROLAC TROMETHAMINE 15 MG: 30 INJECTION, SOLUTION INTRAMUSCULAR; INTRAVENOUS at 15:28

## 2021-09-26 RX ADMIN — OXYCODONE HYDROCHLORIDE AND ACETAMINOPHEN 1 TABLET: 5; 325 TABLET ORAL at 17:40

## 2021-09-26 RX ADMIN — HYDROMORPHONE HYDROCHLORIDE 0.5 MG: 1 INJECTION, SOLUTION INTRAMUSCULAR; INTRAVENOUS; SUBCUTANEOUS at 15:29

## 2021-10-01 DIAGNOSIS — M10.9 GOUT, UNSPECIFIED CAUSE, UNSPECIFIED CHRONICITY, UNSPECIFIED SITE: ICD-10-CM

## 2021-10-01 DIAGNOSIS — I10 ESSENTIAL HYPERTENSION: ICD-10-CM

## 2021-10-01 RX ORDER — ALLOPURINOL 300 MG/1
TABLET ORAL
Qty: 90 TABLET | Refills: 3 | Status: SHIPPED | OUTPATIENT
Start: 2021-10-01 | End: 2022-03-15 | Stop reason: SDUPTHER

## 2021-10-01 RX ORDER — AMLODIPINE BESYLATE 5 MG/1
TABLET ORAL
Qty: 90 TABLET | Refills: 3 | Status: SHIPPED | OUTPATIENT
Start: 2021-10-01 | End: 2022-03-15 | Stop reason: SDUPTHER

## 2021-10-18 ENCOUNTER — TELEPHONE (OUTPATIENT)
Dept: PHYSICAL THERAPY | Facility: OTHER | Age: 46
End: 2021-10-18

## 2021-10-20 ENCOUNTER — NURSE TRIAGE (OUTPATIENT)
Dept: PHYSICAL THERAPY | Facility: OTHER | Age: 46
End: 2021-10-20

## 2021-10-20 ENCOUNTER — TELEPHONE (OUTPATIENT)
Dept: PHYSICAL THERAPY | Facility: OTHER | Age: 46
End: 2021-10-20

## 2021-10-20 DIAGNOSIS — M54.50 ACUTE RIGHT-SIDED LOW BACK PAIN, UNSPECIFIED WHETHER SCIATICA PRESENT: Primary | ICD-10-CM

## 2021-11-01 DIAGNOSIS — E78.2 MIXED HYPERLIPIDEMIA: ICD-10-CM

## 2021-11-01 RX ORDER — ATORVASTATIN CALCIUM 80 MG/1
TABLET, FILM COATED ORAL
Qty: 90 TABLET | Refills: 3 | Status: SHIPPED | OUTPATIENT
Start: 2021-11-01 | End: 2022-03-15 | Stop reason: SDUPTHER

## 2021-11-05 DIAGNOSIS — I10 ESSENTIAL HYPERTENSION: ICD-10-CM

## 2021-11-05 RX ORDER — LOSARTAN POTASSIUM AND HYDROCHLOROTHIAZIDE 25; 100 MG/1; MG/1
TABLET ORAL
Qty: 90 TABLET | Refills: 3 | Status: SHIPPED | OUTPATIENT
Start: 2021-11-05 | End: 2022-03-15 | Stop reason: SDUPTHER

## 2021-11-22 DIAGNOSIS — M54.10 ACUTE LOW BACK PAIN WITH RADICULAR SYMPTOMS, DURATION LESS THAN 6 WEEKS: ICD-10-CM

## 2021-11-23 RX ORDER — CYCLOBENZAPRINE HCL 10 MG
10 TABLET ORAL 3 TIMES DAILY PRN
Qty: 90 TABLET | Refills: 0 | Status: SHIPPED | OUTPATIENT
Start: 2021-11-23 | End: 2022-05-26 | Stop reason: SDUPTHER

## 2021-12-07 ENCOUNTER — OFFICE VISIT (OUTPATIENT)
Dept: FAMILY MEDICINE CLINIC | Facility: CLINIC | Age: 46
End: 2021-12-07
Payer: COMMERCIAL

## 2021-12-07 VITALS
SYSTOLIC BLOOD PRESSURE: 140 MMHG | DIASTOLIC BLOOD PRESSURE: 88 MMHG | WEIGHT: 315 LBS | OXYGEN SATURATION: 98 % | BODY MASS INDEX: 40.43 KG/M2 | RESPIRATION RATE: 18 BRPM | HEART RATE: 82 BPM | HEIGHT: 74 IN

## 2021-12-07 DIAGNOSIS — E11.9 TYPE 2 DIABETES MELLITUS WITHOUT COMPLICATION, WITHOUT LONG-TERM CURRENT USE OF INSULIN (HCC): Primary | ICD-10-CM

## 2021-12-07 DIAGNOSIS — I15.2 HYPERTENSION COMPLICATING DIABETES (HCC): ICD-10-CM

## 2021-12-07 DIAGNOSIS — E11.69 TYPE 2 DIABETES MELLITUS WITH OBESITY (HCC): ICD-10-CM

## 2021-12-07 DIAGNOSIS — E11.59 HYPERTENSION COMPLICATING DIABETES (HCC): ICD-10-CM

## 2021-12-07 DIAGNOSIS — J02.9 SORE THROAT: ICD-10-CM

## 2021-12-07 DIAGNOSIS — E11.69 HYPERLIPIDEMIA ASSOCIATED WITH TYPE 2 DIABETES MELLITUS (HCC): ICD-10-CM

## 2021-12-07 DIAGNOSIS — M21.611 BILATERAL BUNIONS: ICD-10-CM

## 2021-12-07 DIAGNOSIS — M21.612 BILATERAL BUNIONS: ICD-10-CM

## 2021-12-07 DIAGNOSIS — E78.5 HYPERLIPIDEMIA ASSOCIATED WITH TYPE 2 DIABETES MELLITUS (HCC): ICD-10-CM

## 2021-12-07 DIAGNOSIS — E66.9 TYPE 2 DIABETES MELLITUS WITH OBESITY (HCC): ICD-10-CM

## 2021-12-07 LAB
S PYO AG THROAT QL: NEGATIVE
SL AMB POCT HEMOGLOBIN AIC: 7.6 (ref ?–6.5)

## 2021-12-07 PROCEDURE — 1036F TOBACCO NON-USER: CPT | Performed by: PHYSICIAN ASSISTANT

## 2021-12-07 PROCEDURE — 3077F SYST BP >= 140 MM HG: CPT | Performed by: PHYSICIAN ASSISTANT

## 2021-12-07 PROCEDURE — 83036 HEMOGLOBIN GLYCOSYLATED A1C: CPT | Performed by: PHYSICIAN ASSISTANT

## 2021-12-07 PROCEDURE — 3008F BODY MASS INDEX DOCD: CPT | Performed by: PHYSICIAN ASSISTANT

## 2021-12-07 PROCEDURE — 0241U HB NFCT DS VIR RESP RNA 4 TRGT: CPT | Performed by: PHYSICIAN ASSISTANT

## 2021-12-07 PROCEDURE — 99214 OFFICE O/P EST MOD 30 MIN: CPT | Performed by: PHYSICIAN ASSISTANT

## 2021-12-07 PROCEDURE — 3079F DIAST BP 80-89 MM HG: CPT | Performed by: PHYSICIAN ASSISTANT

## 2021-12-07 PROCEDURE — 3051F HG A1C>EQUAL 7.0%<8.0%: CPT | Performed by: PHYSICIAN ASSISTANT

## 2021-12-07 PROCEDURE — 87880 STREP A ASSAY W/OPTIC: CPT | Performed by: PHYSICIAN ASSISTANT

## 2021-12-07 PROCEDURE — 87070 CULTURE OTHR SPECIMN AEROBIC: CPT | Performed by: PHYSICIAN ASSISTANT

## 2021-12-07 PROCEDURE — 3725F SCREEN DEPRESSION PERFORMED: CPT | Performed by: PHYSICIAN ASSISTANT

## 2021-12-08 LAB
FLUAV RNA RESP QL NAA+PROBE: NEGATIVE
FLUBV RNA RESP QL NAA+PROBE: NEGATIVE
RSV RNA RESP QL NAA+PROBE: NEGATIVE
SARS-COV-2 RNA RESP QL NAA+PROBE: NEGATIVE

## 2021-12-09 LAB — BACTERIA THROAT CULT: NORMAL

## 2022-01-04 ENCOUNTER — APPOINTMENT (OUTPATIENT)
Dept: LAB | Facility: CLINIC | Age: 47
End: 2022-01-04
Payer: COMMERCIAL

## 2022-01-04 ENCOUNTER — OFFICE VISIT (OUTPATIENT)
Dept: FAMILY MEDICINE CLINIC | Facility: CLINIC | Age: 47
End: 2022-01-04
Payer: COMMERCIAL

## 2022-01-04 VITALS
HEART RATE: 88 BPM | BODY MASS INDEX: 40.43 KG/M2 | SYSTOLIC BLOOD PRESSURE: 152 MMHG | TEMPERATURE: 98.6 F | HEIGHT: 74 IN | DIASTOLIC BLOOD PRESSURE: 98 MMHG | RESPIRATION RATE: 20 BRPM | WEIGHT: 315 LBS | OXYGEN SATURATION: 97 %

## 2022-01-04 DIAGNOSIS — J01.00 ACUTE NON-RECURRENT MAXILLARY SINUSITIS: Primary | ICD-10-CM

## 2022-01-04 DIAGNOSIS — E11.9 TYPE 2 DIABETES MELLITUS WITHOUT COMPLICATION, WITHOUT LONG-TERM CURRENT USE OF INSULIN (HCC): ICD-10-CM

## 2022-01-04 DIAGNOSIS — E78.5 HYPERLIPIDEMIA ASSOCIATED WITH TYPE 2 DIABETES MELLITUS (HCC): ICD-10-CM

## 2022-01-04 DIAGNOSIS — E11.69 HYPERLIPIDEMIA ASSOCIATED WITH TYPE 2 DIABETES MELLITUS (HCC): ICD-10-CM

## 2022-01-04 DIAGNOSIS — G47.33 OSA (OBSTRUCTIVE SLEEP APNEA): ICD-10-CM

## 2022-01-04 LAB
ALBUMIN SERPL BCP-MCNC: 4.3 G/DL (ref 3.5–5)
ALP SERPL-CCNC: 66 U/L (ref 46–116)
ALT SERPL W P-5'-P-CCNC: 50 U/L (ref 12–78)
ANION GAP SERPL CALCULATED.3IONS-SCNC: 5 MMOL/L (ref 4–13)
AST SERPL W P-5'-P-CCNC: 24 U/L (ref 5–45)
BILIRUB SERPL-MCNC: 0.67 MG/DL (ref 0.2–1)
BUN SERPL-MCNC: 18 MG/DL (ref 5–25)
CALCIUM SERPL-MCNC: 9.4 MG/DL (ref 8.3–10.1)
CHLORIDE SERPL-SCNC: 105 MMOL/L (ref 100–108)
CHOLEST SERPL-MCNC: 179 MG/DL
CO2 SERPL-SCNC: 25 MMOL/L (ref 21–32)
CREAT SERPL-MCNC: 1.02 MG/DL (ref 0.6–1.3)
GFR SERPL CREATININE-BSD FRML MDRD: 86 ML/MIN/1.73SQ M
GLUCOSE P FAST SERPL-MCNC: 191 MG/DL (ref 65–99)
HDLC SERPL-MCNC: 21 MG/DL
LDLC SERPL DIRECT ASSAY-MCNC: 61 MG/DL (ref 0–100)
POTASSIUM SERPL-SCNC: 3.9 MMOL/L (ref 3.5–5.3)
PROT SERPL-MCNC: 7.7 G/DL (ref 6.4–8.2)
SODIUM SERPL-SCNC: 135 MMOL/L (ref 136–145)
TRIGL SERPL-MCNC: 779 MG/DL

## 2022-01-04 PROCEDURE — 36415 COLL VENOUS BLD VENIPUNCTURE: CPT

## 2022-01-04 PROCEDURE — 80061 LIPID PANEL: CPT

## 2022-01-04 PROCEDURE — 3080F DIAST BP >= 90 MM HG: CPT | Performed by: PHYSICIAN ASSISTANT

## 2022-01-04 PROCEDURE — 1036F TOBACCO NON-USER: CPT | Performed by: PHYSICIAN ASSISTANT

## 2022-01-04 PROCEDURE — 3008F BODY MASS INDEX DOCD: CPT | Performed by: PHYSICIAN ASSISTANT

## 2022-01-04 PROCEDURE — 82043 UR ALBUMIN QUANTITATIVE: CPT | Performed by: PHYSICIAN ASSISTANT

## 2022-01-04 PROCEDURE — 83721 ASSAY OF BLOOD LIPOPROTEIN: CPT

## 2022-01-04 PROCEDURE — 80053 COMPREHEN METABOLIC PANEL: CPT

## 2022-01-04 PROCEDURE — 82570 ASSAY OF URINE CREATININE: CPT | Performed by: PHYSICIAN ASSISTANT

## 2022-01-04 PROCEDURE — 99214 OFFICE O/P EST MOD 30 MIN: CPT | Performed by: PHYSICIAN ASSISTANT

## 2022-01-04 PROCEDURE — 3077F SYST BP >= 140 MM HG: CPT | Performed by: PHYSICIAN ASSISTANT

## 2022-01-04 RX ORDER — AMOXICILLIN AND CLAVULANATE POTASSIUM 875; 125 MG/1; MG/1
1 TABLET, FILM COATED ORAL EVERY 12 HOURS SCHEDULED
Qty: 14 TABLET | Refills: 0 | Status: SHIPPED | OUTPATIENT
Start: 2022-01-04 | End: 2022-01-11

## 2022-01-04 NOTE — PROGRESS NOTES
Assessment/Plan:       Problem List Items Addressed This Visit     None      Visit Diagnoses     Acute non-recurrent maxillary sinusitis    -  Primary    Relevant Medications    amoxicillin-clavulanate (Augmentin) 875-125 mg per tablet    SANCHO (obstructive sleep apnea)        Relevant Orders    Ambulatory referral to Sleep Medicine        treat sinusitis with augmentin given longevity of sx  Flonase, antihistamine, mucinex  Refer to sleep medicine, continue wearing CPAP    Subjective:      Patient ID: Joleen Lennox is a 48 y o  male  Pt presents with nearly 1 month of cough, congestion, sinus pressure, PND  He was tested for covid/flu/RSV at onset and this was negative  He has been using zycam without relief of sx  No SOB  Occasional wheezing  Some scratchy throat  No fevers  Also has concerns about his cpap, his machine was recalled, has been using an old one, seems to make his throat very dry  Last sleep study >15 years ago       The following portions of the patient's history were reviewed and updated as appropriate:   He  has a past medical history of CPAP (continuous positive airway pressure) dependence, Diabetes ([facility] ), Gout, HTN (hypertension), Hyperlipidemia, Kidney stone, Morbid obesity with BMI of 40 0-44 9, adult ([facility] ), and SANCHO on CPAP    He   Patient Active Problem List    Diagnosis Date Noted    Hyperlipidemia associated with type 2 diabetes mellitus ([address] ) 12/07/2021    Bilateral bunions 12/07/2021    Type 2 diabetes mellitus with obesity ([facility] ) 12/07/2021    Morbid obesity with BMI of 40 0-44 9, adult ([facility] ) 05/22/2019    Carpal tunnel syndrome on left 02/15/2019    Fatty liver 02/06/2019    Microcytosis 01/22/2019    Gout 12/18/2018    Cubital tunnel syndrome on left 12/18/2018    Radicular pain of right lower back 07/05/2018    Lower back pain 05/29/2018    Mixed hyperlipidemia 05/29/2018    Type 2 diabetes mellitus without complication, without long-term current use of insulin ([address] ) 05/29/2018    Onychomycosis 05/29/2018    Renal calculus, left 06/19/2017    Hypertension complicating diabetes ([address] ) 01/14/2014     He  has a past surgical history that includes pr cysto/uretero w/lithotripsy &indwell stent insrt (Right, 5/1/2017); pr fragment kidney stone/ eswl (Left, 6/19/2017); pr wrist arthroscop,release xvers lig (Left, 2/19/2019); and Ganglion cyst excision (Left, 2/19/2019)  His family history includes Bone cancer in his father; Cancer in his family; Hypertension in his family; Prostate cancer in his maternal grandfather  He  reports that he quit smoking about 13 years ago  He has quit using smokeless tobacco  He reports current alcohol use  He reports that he does not use drugs    Current Outpatient Medications   Medication Sig Dispense Refill    allopurinol (ZYLOPRIM) 300 mg tablet TAKE 1 TABLET DAILY 90 tablet 3    amLODIPine (NORVASC) 5 mg tablet TAKE 1 TABLET DAILY 90 tablet 3    amoxicillin-clavulanate (Augmentin) 875-125 mg per tablet Take 1 tablet by mouth every 12 (twelve) hours for 7 days 14 tablet 0    atorvastatin (LIPITOR) 80 mg tablet TAKE 1 TABLET DAILY 90 tablet 3    Blood Glucose Monitoring Suppl (ONE TOUCH ULTRA 2) w/Device KIT 1 kit by Does not apply route daily      Choline Fenofibrate (Fenofibric Acid) 135 MG CPDR Take 1 capsule (135 mg total) by mouth daily 90 capsule 3    cyclobenzaprine (FLEXERIL) 10 mg tablet Take 1 tablet (10 mg total) by mouth 3 (three) times a day as needed for muscle spasms 90 tablet 0    FLUCELVAX 0 5 ML CATRACHO       glucose blood test strip Use 1 each daily 100 each 5    ibuprofen (MOTRIN) 600 mg tablet TAKE 1 TABLET EVERY 8 HOURS 90 tablet 1    Icosapent Ethyl (Vascepa) 1 g CAPS Take 1 capsule (1 g total) by mouth 2 (two) times a day 360 capsule 1    indomethacin (INDOCIN SR) 75 mg CR capsule Take 1 capsule (75 mg total) by mouth daily as needed (PRN for flare-ups) 90 capsule 0    Jardiance 10 MG TABS TAKE 1 TABLET EVERY MORNING 90 tablet 3    losartan-hydrochlorothiazide (HYZAAR) 100-25 MG per tablet TAKE 1 TABLET DAILY 90 tablet 3    metFORMIN (GLUCOPHAGE-XR) 500 mg 24 hr tablet TAKE 2 TABLETS TWICE A DAY WITH MEALS 360 tablet 3    ONETOUCH DELICA LANCETS 52I MISC 1 Units by Does not apply route daily      predniSONE 20 mg tablet Take 2 tablets (40 mg total) by mouth daily 6 tablet 0    Trulicity 1 5 UY/7 2VF SOPN INJECT 0 5 ML (1 5 MG) UNDER THE SKIN ONCE A WEEK 6 mL 3     No current facility-administered medications for this visit       Current Outpatient Medications on File Prior to Visit   Medication Sig    allopurinol (ZYLOPRIM) 300 mg tablet TAKE 1 TABLET DAILY    amLODIPine (NORVASC) 5 mg tablet TAKE 1 TABLET DAILY    atorvastatin (LIPITOR) 80 mg tablet TAKE 1 TABLET DAILY    Blood Glucose Monitoring Suppl (ONE TOUCH ULTRA 2) w/Device KIT 1 kit by Does not apply route daily    Choline Fenofibrate (Fenofibric Acid) 135 MG CPDR Take 1 capsule (135 mg total) by mouth daily    cyclobenzaprine (FLEXERIL) 10 mg tablet Take 1 tablet (10 mg total) by mouth 3 (three) times a day as needed for muscle spasms    FLUCELVAX 0 5 ML CATRACHO     glucose blood test strip Use 1 each daily    ibuprofen (MOTRIN) 600 mg tablet TAKE 1 TABLET EVERY 8 HOURS    Icosapent Ethyl (Vascepa) 1 g CAPS Take 1 capsule (1 g total) by mouth 2 (two) times a day    indomethacin (INDOCIN SR) 75 mg CR capsule Take 1 capsule (75 mg total) by mouth daily as needed (PRN for flare-ups)    Jardiance 10 MG TABS TAKE 1 TABLET EVERY MORNING    losartan-hydrochlorothiazide (HYZAAR) 100-25 MG per tablet TAKE 1 TABLET DAILY    metFORMIN (GLUCOPHAGE-XR) 500 mg 24 hr tablet TAKE 2 TABLETS TWICE A DAY WITH MEALS    ONETOUCH DELICA LANCETS 75J MISC 1 Units by Does not apply route daily    predniSONE 20 mg tablet Take 2 tablets (40 mg total) by mouth daily    Trulicity 1 5 FC/6 7XS SOPN INJECT 0 5 ML (1 5 MG) UNDER THE SKIN ONCE A WEEK     No current facility-administered medications on file prior to visit  He has No Known Allergies       Review of Systems   Constitutional: Negative for chills, fatigue and fever  HENT: Positive for congestion, postnasal drip, rhinorrhea, sinus pressure and sore throat  Negative for ear pain, hearing loss, nosebleeds, sinus pain and sneezing  Eyes: Negative for pain, discharge, itching and visual disturbance  Respiratory: Positive for cough  Negative for chest tightness, shortness of breath and wheezing  Cardiovascular: Negative for chest pain, palpitations and leg swelling  Gastrointestinal: Negative for abdominal pain, blood in stool, constipation, diarrhea, nausea and vomiting  Genitourinary: Negative for frequency and urgency  Neurological: Negative for dizziness, light-headedness and numbness  Objective:      /98 (BP Location: Left arm, Patient Position: Sitting)   Pulse 88   Temp 98 6 °F (37 °C)   Resp 20   Ht 6' 2" (1 88 m)   Wt (!) 147 kg (324 lb)   SpO2 97%   BMI 41 60 kg/m²          Physical Exam  Vitals and nursing note reviewed  Constitutional:       General: He is not in acute distress  Appearance: Normal appearance  HENT:      Head: Normocephalic and atraumatic  Right Ear: Tympanic membrane, ear canal and external ear normal       Left Ear: Tympanic membrane, ear canal and external ear normal       Nose: Congestion present  Right Sinus: Maxillary sinus tenderness present  Left Sinus: Maxillary sinus tenderness present  Mouth/Throat:      Mouth: Mucous membranes are moist       Pharynx: Oropharynx is clear  No oropharyngeal exudate or posterior oropharyngeal erythema  Eyes:      Pupils: Pupils are equal, round, and reactive to light  Cardiovascular:      Rate and Rhythm: Normal rate and regular rhythm  Heart sounds: Normal heart sounds  No murmur heard  Pulmonary:      Effort: Pulmonary effort is normal  No respiratory distress  Breath sounds: Normal breath sounds  No wheezing  Abdominal:      General: Bowel sounds are normal       Palpations: Abdomen is soft  Musculoskeletal:         General: Normal range of motion  Cervical back: Normal range of motion and neck supple  Lymphadenopathy:      Cervical: No cervical adenopathy  Skin:     General: Skin is warm and dry  Neurological:      Mental Status: He is alert and oriented to person, place, and time     Psychiatric:         Mood and Affect: Mood and affect normal

## 2022-01-06 LAB
CREAT UR-MCNC: 73.6 MG/DL
MICROALBUMIN UR-MCNC: 29.2 MG/L (ref 0–20)
MICROALBUMIN/CREAT 24H UR: 40 MG/G CREATININE (ref 0–30)

## 2022-01-06 PROCEDURE — 3061F NEG MICROALBUMINURIA REV: CPT | Performed by: PHYSICIAN ASSISTANT

## 2022-01-27 DIAGNOSIS — R52 PAIN: ICD-10-CM

## 2022-01-28 RX ORDER — IBUPROFEN 600 MG/1
TABLET ORAL
Qty: 90 TABLET | Refills: 11 | Status: SHIPPED | OUTPATIENT
Start: 2022-01-28

## 2022-03-01 ENCOUNTER — APPOINTMENT (OUTPATIENT)
Dept: LAB | Facility: CLINIC | Age: 47
End: 2022-03-01
Payer: COMMERCIAL

## 2022-03-01 DIAGNOSIS — E11.69 HYPERLIPIDEMIA ASSOCIATED WITH TYPE 2 DIABETES MELLITUS (HCC): ICD-10-CM

## 2022-03-01 DIAGNOSIS — E78.5 HYPERLIPIDEMIA ASSOCIATED WITH TYPE 2 DIABETES MELLITUS (HCC): ICD-10-CM

## 2022-03-01 LAB
CHOLEST SERPL-MCNC: 165 MG/DL
HDLC SERPL-MCNC: 24 MG/DL
LDLC SERPL DIRECT ASSAY-MCNC: 60 MG/DL (ref 0–100)
TRIGL SERPL-MCNC: 742 MG/DL

## 2022-03-01 PROCEDURE — 80061 LIPID PANEL: CPT

## 2022-03-01 PROCEDURE — 36415 COLL VENOUS BLD VENIPUNCTURE: CPT

## 2022-03-01 PROCEDURE — 83721 ASSAY OF BLOOD LIPOPROTEIN: CPT

## 2022-03-08 ENCOUNTER — OFFICE VISIT (OUTPATIENT)
Dept: UROLOGY | Facility: CLINIC | Age: 47
End: 2022-03-08
Payer: COMMERCIAL

## 2022-03-08 VITALS
BODY MASS INDEX: 40.43 KG/M2 | SYSTOLIC BLOOD PRESSURE: 138 MMHG | HEIGHT: 74 IN | WEIGHT: 315 LBS | DIASTOLIC BLOOD PRESSURE: 86 MMHG

## 2022-03-08 DIAGNOSIS — N40.1 BPH WITH OBSTRUCTION/LOWER URINARY TRACT SYMPTOMS: Primary | ICD-10-CM

## 2022-03-08 DIAGNOSIS — R35.0 URINARY FREQUENCY: ICD-10-CM

## 2022-03-08 DIAGNOSIS — N52.8 OTHER MALE ERECTILE DYSFUNCTION: ICD-10-CM

## 2022-03-08 DIAGNOSIS — N13.8 BPH WITH OBSTRUCTION/LOWER URINARY TRACT SYMPTOMS: Primary | ICD-10-CM

## 2022-03-08 LAB
POST-VOID RESIDUAL VOLUME, ML POC: 22 ML
SL AMB  POCT GLUCOSE, UA: 2000
SL AMB LEUKOCYTE ESTERASE,UA: NORMAL
SL AMB POCT BILIRUBIN,UA: NORMAL
SL AMB POCT BLOOD,UA: NORMAL
SL AMB POCT CLARITY,UA: CLEAR
SL AMB POCT COLOR,UA: YELLOW
SL AMB POCT KETONES,UA: NORMAL
SL AMB POCT NITRITE,UA: NORMAL
SL AMB POCT PH,UA: 5
SL AMB POCT SPECIFIC GRAVITY,UA: 1.01
SL AMB POCT URINE PROTEIN: NORMAL
SL AMB POCT UROBILINOGEN: NORMAL

## 2022-03-08 PROCEDURE — 51798 US URINE CAPACITY MEASURE: CPT | Performed by: PHYSICIAN ASSISTANT

## 2022-03-08 PROCEDURE — 81002 URINALYSIS NONAUTO W/O SCOPE: CPT | Performed by: PHYSICIAN ASSISTANT

## 2022-03-08 PROCEDURE — 99213 OFFICE O/P EST LOW 20 MIN: CPT | Performed by: PHYSICIAN ASSISTANT

## 2022-03-08 PROCEDURE — 3061F NEG MICROALBUMINURIA REV: CPT | Performed by: PHYSICIAN ASSISTANT

## 2022-03-08 RX ORDER — SILDENAFIL 100 MG/1
100 TABLET, FILM COATED ORAL DAILY PRN
Qty: 30 TABLET | Refills: 3 | Status: SHIPPED | OUTPATIENT
Start: 2022-03-08 | End: 2022-03-15 | Stop reason: SDUPTHER

## 2022-03-08 RX ORDER — TAMSULOSIN HYDROCHLORIDE 0.4 MG/1
0.4 CAPSULE ORAL
Qty: 90 CAPSULE | Refills: 3 | Status: SHIPPED | OUTPATIENT
Start: 2022-03-08

## 2022-03-08 NOTE — PROGRESS NOTES
UROLOGY CONSULTATION NOTE     Patient Identifiers: Oswaldo Dobson (MRN: [id number])  Service Requesting Consultation:Referral Self   Service Providing Consultation:  Urology, Kuldip De La Cruz PA-C  Consults  Date of Service: 3/8/2022    Reason for Consultation:   Prostate check    History of Present Illness:     Oswaldo Dobson is a 48 y o  Male with history kidney stones known from [facility] urology  He was last seen 2018  He had lithotripsy in 2017  He has small stones on KUB  He drinks about a gal of water a day with lemon and takes allopurinol 300 mg daily  He has had no recent stone exacerbations  He does have sleep apnea and wears a CPAP  He complains of some urinary frequency with slow flow as well as ED  There is no recent PSA  He denies any family history of prostate bladder or kidney diseases  Past Medical, Past Surgical History:     Past Medical History:   Diagnosis Date    CPAP (continuous positive airway pressure) dependence     Diabetes ([address] )     Gout     HTN (hypertension)     Hyperlipidemia     Kidney stone     RIGHT    Morbid obesity with BMI of 40 0-44 9, adult (HCC)     SANCHO on CPAP    :    Past Surgical History:   Procedure Laterality Date    GANGLION CYST EXCISION Left 2/19/2019    Procedure: WRIST GANGLION CYST EXCISION;  Surgeon: Venice De Souza MD;  Location: [facility];  Service: Orthopedics    WA CYSTO/URETERO W/LITHOTRIPSY &INDWELL STENT INSRT Right 5/1/2017    Procedure: CYSTOSCOPY URETEROSCOPY WITH LITHOTRIPSY HOLMIUM LASER, RETROGRADE PYELOGRAM AND INSERTION STENT URETERAL;  Surgeon: Avtar Hoffmna MD;  [facility]: [facility];  Service: Urology    Tony ESWL Left 6/19/2017    Procedure: Bridgette Pate SHOCKWAVE (ESWL);   Surgeon: Avtar Hoffman MD;  [facility];  Service: Urology    WA WRIST Johnathon Larissa LIG Left 2/19/2019    Procedure: ENDOSCOPIC CARPAL TUNNEL RELEASE;  Surgeon: Venice De Souza MD;  Location: [facility]; Service: Orthopedics   :    Medications, Allergies:     Current Outpatient Medications:     allopurinol (ZYLOPRIM) 300 mg tablet, TAKE 1 TABLET DAILY, Disp: 90 tablet, Rfl: 3    amLODIPine (NORVASC) 5 mg tablet, TAKE 1 TABLET DAILY, Disp: 90 tablet, Rfl: 3    atorvastatin (LIPITOR) 80 mg tablet, TAKE 1 TABLET DAILY, Disp: 90 tablet, Rfl: 3    Blood Glucose Monitoring Suppl (ONE TOUCH ULTRA 2) w/Device KIT, 1 kit by Does not apply route daily, Disp: , Rfl:     Choline Fenofibrate (Fenofibric Acid) 135 MG CPDR, Take 1 capsule (135 mg total) by mouth daily, Disp: 90 capsule, Rfl: 3    cyclobenzaprine (FLEXERIL) 10 mg tablet, Take 1 tablet (10 mg total) by mouth 3 (three) times a day as needed for muscle spasms, Disp: 90 tablet, Rfl: 0    FLUCELVAX 0 5 ML CATRACHO, , Disp: , Rfl:     glucose blood test strip, Use 1 each daily, Disp: 100 each, Rfl: 5    ibuprofen (MOTRIN) 600 mg tablet, TAKE 1 TABLET EVERY 8 HOURS, Disp: 90 tablet, Rfl: 11    Icosapent Ethyl (Vascepa) 1 g CAPS, Take 1 capsule (1 g total) by mouth 2 (two) times a day, Disp: 360 capsule, Rfl: 1    indomethacin (INDOCIN SR) 75 mg CR capsule, Take 1 capsule (75 mg total) by mouth daily as needed (PRN for flare-ups), Disp: 90 capsule, Rfl: 0    Jardiance 10 MG TABS, TAKE 1 TABLET EVERY MORNING, Disp: 90 tablet, Rfl: 3    losartan-hydrochlorothiazide (HYZAAR) 100-25 MG per tablet, TAKE 1 TABLET DAILY, Disp: 90 tablet, Rfl: 3    metFORMIN (GLUCOPHAGE-XR) 500 mg 24 hr tablet, TAKE 2 TABLETS TWICE A DAY WITH MEALS, Disp: 360 tablet, Rfl: 3    ONETOUCH DELICA LANCETS 99Y MISC, 1 Units by Does not apply route daily, Disp: , Rfl:     Trulicity 1 5 SO/7 6YX SOPN, INJECT 0 5 ML (1 5 MG) UNDER THE SKIN ONCE A WEEK, Disp: 6 mL, Rfl: 3    sildenafil (VIAGRA) 100 mg tablet, Take 1 tablet (100 mg total) by mouth daily as needed for erectile dysfunction, Disp: 30 tablet, Rfl: 3    tamsulosin (FLOMAX) 0 4 mg, Take 1 capsule (0 4 mg total) by mouth daily with dinner, Disp: 90 capsule, Rfl: 3    Allergies:  No Known Allergies:    Social and Family History:   Social History:   Social History     Tobacco Use    Smoking status: Former Smoker     Quit date:      Years since quittin 1    Smokeless tobacco: Former User    Tobacco comment: Former Smoker as per [email or web address]   Substance Use Topics    Alcohol use: Yes     Comment: socially    Drug use: No        Social History     Tobacco Use   Smoking Status Former Smoker    Quit date:     Years since quittin 1   Smokeless Tobacco Former User   Tobacco Comment    Former Smoker as per [employer]       Family History:  Family History   Problem Relation Age of Onset    Bone cancer Father     Prostate cancer Maternal Grandfather     Cancer Family     Hypertension Family    :     Review of Systems:     General: Fever, chills, or night sweats: negative  Cardiac: Negative for chest pain  Pulmonary: Negative for shortness of breath  Gastrointestinal: Abdominal pain negative  Nausea, vomiting, or diarrhea negative,  Genitourinary: See HPI above  Patient does not have hematuria  All other systems queried were negative  Physical Exam:   General: Patient is pleasant and in NAD  Awake and alert  /86 (BP Location: Left arm, Patient Position: Sitting, Cuff Size: Adult)   Ht 6' 2" (1 88 m)   Wt (!) 145 kg (320 lb)   BMI 41 09 kg/m²   HEENT: conjunctiva are clear  Constitutional:  pleasant and cooperative     no apparent distress  Cardiac: Peripheral edema: negative  Pulmonary: Non-labored breathing  Abdomen: Soft, non-tender, non-distended  No surgical scars  No masses, tenderness, hernias noted  Genitourinary: Negative CVA tenderness, negative suprapubic tenderness  Extremities: moves all extremities  Neurological:CNII-XII intact  No numbness or tingling   Essentially non focal neurologic exam  Psychiatric:mood affect and behavior normal    (Male): Penis circumcised, phallus normal, meatus patent  Testicles descended into scrotum bilaterally without masses nor tenderness  No inguinal hernias bilaterally  TAZ: Prostate is not enlarged at 30 grams  The prostate is not boggy  The prostate is not tender  No nodules noted  Labs:     Lab Results   Component Value Date    HGB 15 2 09/26/2021    HCT 44 1 09/26/2021    WBC 7 70 09/26/2021     09/26/2021   ]    Lab Results   Component Value Date     (L) 12/26/2017    K 3 9 01/04/2022     01/04/2022    CO2 25 01/04/2022    BUN 18 01/04/2022    CREATININE 1 02 01/04/2022    CALCIUM 9 4 01/04/2022    GLUCOSE 316 (H) 12/26/2017   ]    Imaging:   I personally reviewed the images and report of the following studies, and reviewed them with the patient:   none recent  ASSESSMENT:      1  Nephrolithiasis   2  BPH   3  Erectile dysfunction    PLAN:   - continue allopurinol  - trial of Flomax  - trial of sildenafil  - follow-up in 3 months with PSA prior to visit      Thank you for allowing me to participate in this patients care  Please do not hesitate to call with any additional questions    Ishan Manjarrez PA-C

## 2022-03-15 ENCOUNTER — OFFICE VISIT (OUTPATIENT)
Dept: FAMILY MEDICINE CLINIC | Facility: CLINIC | Age: 47
End: 2022-03-15
Payer: COMMERCIAL

## 2022-03-15 VITALS
BODY MASS INDEX: 40.43 KG/M2 | DIASTOLIC BLOOD PRESSURE: 80 MMHG | HEIGHT: 74 IN | WEIGHT: 315 LBS | OXYGEN SATURATION: 97 % | SYSTOLIC BLOOD PRESSURE: 132 MMHG | TEMPERATURE: 96.1 F | HEART RATE: 75 BPM

## 2022-03-15 DIAGNOSIS — N40.1 BPH WITH OBSTRUCTION/LOWER URINARY TRACT SYMPTOMS: ICD-10-CM

## 2022-03-15 DIAGNOSIS — M10.9 GOUT, UNSPECIFIED CAUSE, UNSPECIFIED CHRONICITY, UNSPECIFIED SITE: ICD-10-CM

## 2022-03-15 DIAGNOSIS — I15.2 HYPERTENSION COMPLICATING DIABETES (HCC): ICD-10-CM

## 2022-03-15 DIAGNOSIS — E78.2 MIXED HYPERLIPIDEMIA: ICD-10-CM

## 2022-03-15 DIAGNOSIS — E11.69 TYPE 2 DIABETES MELLITUS WITH OBESITY (HCC): ICD-10-CM

## 2022-03-15 DIAGNOSIS — I10 ESSENTIAL HYPERTENSION: ICD-10-CM

## 2022-03-15 DIAGNOSIS — N52.8 OTHER MALE ERECTILE DYSFUNCTION: ICD-10-CM

## 2022-03-15 DIAGNOSIS — E11.9 TYPE 2 DIABETES MELLITUS WITHOUT COMPLICATION, WITHOUT LONG-TERM CURRENT USE OF INSULIN (HCC): Primary | ICD-10-CM

## 2022-03-15 DIAGNOSIS — E11.69 HYPERLIPIDEMIA ASSOCIATED WITH TYPE 2 DIABETES MELLITUS (HCC): ICD-10-CM

## 2022-03-15 DIAGNOSIS — E78.1 HIGH TRIGLYCERIDES: ICD-10-CM

## 2022-03-15 DIAGNOSIS — E66.01 MORBID OBESITY WITH BMI OF 40.0-44.9, ADULT (HCC): ICD-10-CM

## 2022-03-15 DIAGNOSIS — E11.59 HYPERTENSION COMPLICATING DIABETES (HCC): ICD-10-CM

## 2022-03-15 DIAGNOSIS — N13.8 BPH WITH OBSTRUCTION/LOWER URINARY TRACT SYMPTOMS: ICD-10-CM

## 2022-03-15 DIAGNOSIS — E78.5 HYPERLIPIDEMIA ASSOCIATED WITH TYPE 2 DIABETES MELLITUS (HCC): ICD-10-CM

## 2022-03-15 DIAGNOSIS — E66.9 TYPE 2 DIABETES MELLITUS WITH OBESITY (HCC): ICD-10-CM

## 2022-03-15 LAB — SL AMB POCT HEMOGLOBIN AIC: 7.5 (ref ?–6.5)

## 2022-03-15 PROCEDURE — 3079F DIAST BP 80-89 MM HG: CPT | Performed by: PHYSICIAN ASSISTANT

## 2022-03-15 PROCEDURE — 83036 HEMOGLOBIN GLYCOSYLATED A1C: CPT | Performed by: PHYSICIAN ASSISTANT

## 2022-03-15 PROCEDURE — 3051F HG A1C>EQUAL 7.0%<8.0%: CPT | Performed by: PHYSICIAN ASSISTANT

## 2022-03-15 PROCEDURE — 99214 OFFICE O/P EST MOD 30 MIN: CPT | Performed by: PHYSICIAN ASSISTANT

## 2022-03-15 PROCEDURE — 1036F TOBACCO NON-USER: CPT | Performed by: PHYSICIAN ASSISTANT

## 2022-03-15 PROCEDURE — 3008F BODY MASS INDEX DOCD: CPT | Performed by: PHYSICIAN ASSISTANT

## 2022-03-15 RX ORDER — ALLOPURINOL 300 MG/1
300 TABLET ORAL DAILY
Qty: 90 TABLET | Refills: 3 | Status: SHIPPED | OUTPATIENT
Start: 2022-03-15

## 2022-03-15 RX ORDER — FENOFIBRIC ACID 135 MG/1
1 CAPSULE, DELAYED RELEASE ORAL DAILY
Qty: 90 CAPSULE | Refills: 3 | Status: SHIPPED | OUTPATIENT
Start: 2022-03-15

## 2022-03-15 RX ORDER — DULAGLUTIDE 1.5 MG/.5ML
1.5 INJECTION, SOLUTION SUBCUTANEOUS WEEKLY
Qty: 6 ML | Refills: 3 | Status: SHIPPED | OUTPATIENT
Start: 2022-03-15

## 2022-03-15 RX ORDER — ATORVASTATIN CALCIUM 80 MG/1
80 TABLET, FILM COATED ORAL DAILY
Qty: 90 TABLET | Refills: 3 | Status: SHIPPED | OUTPATIENT
Start: 2022-03-15

## 2022-03-15 RX ORDER — LOSARTAN POTASSIUM AND HYDROCHLOROTHIAZIDE 25; 100 MG/1; MG/1
1 TABLET ORAL DAILY
Qty: 90 TABLET | Refills: 3 | Status: SHIPPED | OUTPATIENT
Start: 2022-03-15

## 2022-03-15 RX ORDER — SILDENAFIL 100 MG/1
100 TABLET, FILM COATED ORAL DAILY PRN
Qty: 30 TABLET | Refills: 3 | Status: SHIPPED | OUTPATIENT
Start: 2022-03-15

## 2022-03-15 RX ORDER — METFORMIN HYDROCHLORIDE 500 MG/1
1000 TABLET, EXTENDED RELEASE ORAL 2 TIMES DAILY WITH MEALS
Qty: 360 TABLET | Refills: 3 | Status: SHIPPED | OUTPATIENT
Start: 2022-03-15

## 2022-03-15 RX ORDER — AMLODIPINE BESYLATE 5 MG/1
5 TABLET ORAL DAILY
Qty: 90 TABLET | Refills: 3 | Status: SHIPPED | OUTPATIENT
Start: 2022-03-15

## 2022-03-15 NOTE — PROGRESS NOTES
Assessment/Plan:       Problem List Items Addressed This Visit        Endocrine    Hypertension complicating diabetes ([address] )    Relevant Medications    amLODIPine (NORVASC) 5 mg tablet    Empagliflozin (Jardiance) 10 MG TABS    losartan-hydrochlorothiazide (HYZAAR) 100-25 MG per tablet    metFORMIN (GLUCOPHAGE-XR) 500 mg 24 hr tablet    Trulicity 1 5 PJ/7 9DA SOPN    Other Relevant Orders    CBC and differential    Type 2 diabetes mellitus without complication, without long-term current use of insulin (HCC) - Primary    Relevant Medications    Empagliflozin (Jardiance) 10 MG TABS    metFORMIN (GLUCOPHAGE-XR) 500 mg 24 hr tablet    Trulicity 1 5 JS/8 1TT SOPN    Other Relevant Orders    POCT hemoglobin A1c    Comprehensive metabolic panel    HEMOGLOBIN A1C W/ EAG ESTIMATION    Microalbumin / creatinine urine ratio    Hyperlipidemia associated with type 2 diabetes mellitus (HCC)    Relevant Medications    Empagliflozin (Jardiance) 10 MG TABS    metFORMIN (GLUCOPHAGE-XR) 500 mg 24 hr tablet    Trulicity 1 5 GT/0 8WZ SOPN    Other Relevant Orders    Lipid Panel with Direct LDL reflex    CBC and differential    Type 2 diabetes mellitus with obesity (HCC)    Relevant Medications    Empagliflozin (Jardiance) 10 MG TABS    metFORMIN (GLUCOPHAGE-XR) 500 mg 24 hr tablet    Trulicity 1 5 JH/9 3SC SOPN    Other Relevant Orders    HEMOGLOBIN A1C W/ EAG ESTIMATION    CBC and differential    Microalbumin / creatinine urine ratio       Other    Mixed hyperlipidemia    Relevant Medications    atorvastatin (LIPITOR) 80 mg tablet    Choline Fenofibrate (Fenofibric Acid) 135 MG CPDR    Other Relevant Orders    TSH, 3rd generation with Free T4 reflex    Gout    Relevant Medications    allopurinol (ZYLOPRIM) 300 mg tablet    Morbid obesity with BMI of 40 0-44 9, adult (HCC)    Relevant Orders    CBC and differential      Other Visit Diagnoses     Essential hypertension        Relevant Medications    amLODIPine (NORVASC) 5 mg tablet losartan-hydrochlorothiazide (HYZAAR) 100-25 MG per tablet    Other Relevant Orders    CBC and differential    High triglycerides        Relevant Medications    atorvastatin (LIPITOR) 80 mg tablet    Choline Fenofibrate (Fenofibric Acid) 135 MG CPDR    Other Relevant Orders    TSH, 3rd generation with Free T4 reflex    Other male erectile dysfunction        Relevant Medications    sildenafil (VIAGRA) 100 mg tablet    BPH with obstruction/lower urinary tract symptoms        Relevant Medications    sildenafil (VIAGRA) 100 mg tablet        a1c 7 5, continue current diabetic regimen, encouraged increased exercise, weight loss  Continue statin, fibrate, omega 3  BP at goal  Unremarkable exam  3 month follow up with labs, earlier prn     Subjective:      Patient ID: Uzma Ashley is a 48 y o  male  Pt presents for diabetic follow up     DM: a1c 7 5, on metformin, jardiance, trulicity, +statin/arb  HLD: on statin, fibrate, omega 3, labs below, severely elevated chronic hypertriglyceridemia >700   He shares he has been eating a lot of salads and oatmeal  HTN: Well controlled, 132/80 today, on losartan-hctz, amlodipine  Gout: on allopurinol, no recent flare    Overall feeling well, no acute concerns     Recent Results (from the past 672 hour(s))  -Lipid Panel with Direct LDL reflex:   Collection Time: 03/01/22  9:54 AM       Result                      Value             Ref Range           Cholesterol                 165               See Comment *       Triglycerides               742 (H)           See Comment *       HDL, Direct                 24 (L)            >=40 mg/dL          LDL Calculated                                               -LDL cholesterol, direct:   Collection Time: 03/01/22  9:54 AM       Result                      Value             Ref Range           LDL Direct                  60                0 - 100 mg/dl  -POCT urine dip:   Collection Time: 03/08/22  9:33 AM       Result Value             Ref Range           LEUKOCYTE ESTERASE,UA       -                                     NITRITE,UA                  -                                     SL AMB POCT UROBILINOG*     -                                     POCT URINE PROTEIN          -                                      PH,UA                      5                                     BLOOD,UA                    -                                     SPECIFIC GRAVITY,UA         1 015                                 KETONES,UA                  -                                     BILIRUBIN,UA                -                                     GLUCOSE, UA                 2,000                                  COLOR,UA                   yellow                                CLARITY,UA                  clear                            -POCT Measure PVR:   Collection Time: 03/08/22  9:36 AM       Result                      Value             Ref Range           POST-VOID RESIDUAL VOL*     22                mL             -POCT hemoglobin A1c:   Collection Time: 03/15/22  8:57 AM       Result                      Value             Ref Range           Hemoglobin A1C              7 5 (A)           6 5                    The following portions of the patient's history were reviewed and updated as appropriate:   He  has a past medical history of CPAP (continuous positive airway pressure) dependence, Diabetes ([place] [address] ), Gout, HTN (hypertension), Hyperlipidemia, Kidney stone, Morbid obesity with BMI of 40 0-44 9, adult ([place] [address] ), and SANCHO on CPAP    He   Patient Active Problem List    Diagnosis Date Noted    Hyperlipidemia associated with type 2 diabetes mellitus ([place] [address] ) 12/07/2021    Bilateral bunions 12/07/2021    Type 2 diabetes mellitus with obesity ([place] [address] ) 12/07/2021    Morbid obesity with BMI of 40 0-44 9, adult ([place] [address] ) 05/22/2019    Carpal tunnel syndrome on left 02/15/2019    Fatty liver 02/06/2019    Microcytosis 01/22/2019    Gout 12/18/2018  Cubital tunnel syndrome on left 12/18/2018    Radicular pain of right lower back 07/05/2018    Lower back pain 05/29/2018    Mixed hyperlipidemia 05/29/2018    Type 2 diabetes mellitus without complication, without long-term current use of insulin ([facility] ) 05/29/2018    Onychomycosis 05/29/2018    Renal calculus, left 06/19/2017    Hypertension complicating diabetes ([facility] ) 01/14/2014     He  has a past surgical history that includes pr cysto/uretero w/lithotripsy &indwell stent insrt (Right, 5/1/2017); pr fragment kidney stone/ eswl (Left, 6/19/2017); pr wrist arthroscop,release xvers lig (Left, 2/19/2019); and Ganglion cyst excision (Left, 2/19/2019)  His family history includes Bone cancer in his father; Cancer in his family; Hypertension in his family; Prostate cancer in his maternal grandfather  He  reports that he quit smoking about 13 years ago  He has quit using smokeless tobacco  He reports current alcohol use  He reports that he does not use drugs    Current Outpatient Medications   Medication Sig Dispense Refill    allopurinol (ZYLOPRIM) 300 mg tablet Take 1 tablet (300 mg total) by mouth daily 90 tablet 3    amLODIPine (NORVASC) 5 mg tablet Take 1 tablet (5 mg total) by mouth daily 90 tablet 3    atorvastatin (LIPITOR) 80 mg tablet Take 1 tablet (80 mg total) by mouth daily 90 tablet 3    Blood Glucose Monitoring Suppl (ONE TOUCH ULTRA 2) w/Device KIT 1 kit by Does not apply route daily      Choline Fenofibrate (Fenofibric Acid) 135 MG CPDR Take 1 capsule (135 mg total) by mouth daily 90 capsule 3    cyclobenzaprine (FLEXERIL) 10 mg tablet Take 1 tablet (10 mg total) by mouth 3 (three) times a day as needed for muscle spasms 90 tablet 0    Empagliflozin (Jardiance) 10 MG TABS Take 1 tablet (10 mg total) by mouth every morning 90 tablet 3    FLUCELVAX 0 5 ML CATRACHO       glucose blood test strip Use 1 each daily 100 each 5    ibuprofen (MOTRIN) 600 mg tablet TAKE 1 TABLET EVERY 8 HOURS 90 tablet 11    Icosapent Ethyl (Vascepa) 1 g CAPS Take 1 capsule (1 g total) by mouth 2 (two) times a day 360 capsule 1    indomethacin (INDOCIN SR) 75 mg CR capsule Take 1 capsule (75 mg total) by mouth daily as needed (PRN for flare-ups) 90 capsule 0    losartan-hydrochlorothiazide (HYZAAR) 100-25 MG per tablet Take 1 tablet by mouth daily 90 tablet 3    metFORMIN (GLUCOPHAGE-XR) 500 mg 24 hr tablet Take 2 tablets (1,000 mg total) by mouth 2 (two) times a day with meals 360 tablet 3    ONETOUCH DELICA LANCETS 56B MISC 1 Units by Does not apply route daily      sildenafil (VIAGRA) 100 mg tablet Take 1 tablet (100 mg total) by mouth daily as needed for erectile dysfunction 30 tablet 3    tamsulosin (FLOMAX) 0 4 mg Take 1 capsule (0 4 mg total) by mouth daily with dinner 90 capsule 3    Trulicity 1 5 XP/1 3UY SOPN Inject 0 5 mL (1 5 mg total) under the skin once a week 6 mL 3     No current facility-administered medications for this visit       Current Outpatient Medications on File Prior to Visit   Medication Sig    Blood Glucose Monitoring Suppl (ONE TOUCH ULTRA 2) w/Device KIT 1 kit by Does not apply route daily    cyclobenzaprine (FLEXERIL) 10 mg tablet Take 1 tablet (10 mg total) by mouth 3 (three) times a day as needed for muscle spasms    FLUCELVAX 0 5 ML CATRACHO     glucose blood test strip Use 1 each daily    ibuprofen (MOTRIN) 600 mg tablet TAKE 1 TABLET EVERY 8 HOURS    Icosapent Ethyl (Vascepa) 1 g CAPS Take 1 capsule (1 g total) by mouth 2 (two) times a day    indomethacin (INDOCIN SR) 75 mg CR capsule Take 1 capsule (75 mg total) by mouth daily as needed (PRN for flare-ups)    ONETOUCH DELICA LANCETS 05I MISC 1 Units by Does not apply route daily    tamsulosin (FLOMAX) 0 4 mg Take 1 capsule (0 4 mg total) by mouth daily with dinner    [DISCONTINUED] allopurinol (ZYLOPRIM) 300 mg tablet TAKE 1 TABLET DAILY    [DISCONTINUED] amLODIPine (NORVASC) 5 mg tablet TAKE 1 TABLET DAILY    [DISCONTINUED] atorvastatin (LIPITOR) 80 mg tablet TAKE 1 TABLET DAILY    [DISCONTINUED] Choline Fenofibrate (Fenofibric Acid) 135 MG CPDR Take 1 capsule (135 mg total) by mouth daily    [DISCONTINUED] Jardiance 10 MG TABS TAKE 1 TABLET EVERY MORNING    [DISCONTINUED] losartan-hydrochlorothiazide (HYZAAR) 100-25 MG per tablet TAKE 1 TABLET DAILY    [DISCONTINUED] metFORMIN (GLUCOPHAGE-XR) 500 mg 24 hr tablet TAKE 2 TABLETS TWICE A DAY WITH MEALS    [DISCONTINUED] sildenafil (VIAGRA) 100 mg tablet Take 1 tablet (100 mg total) by mouth daily as needed for erectile dysfunction    [DISCONTINUED] Trulicity 1 5 TK/9 5KN SOPN INJECT 0 5 ML (1 5 MG) UNDER THE SKIN ONCE A WEEK     No current facility-administered medications on file prior to visit  He has No Known Allergies       Review of Systems   Constitutional: Negative for chills, fatigue and fever  HENT: Negative for congestion, ear pain, hearing loss, nosebleeds, postnasal drip, rhinorrhea, sinus pressure, sinus pain, sneezing and sore throat  Eyes: Negative for pain, discharge, itching and visual disturbance  Respiratory: Negative for cough, chest tightness, shortness of breath and wheezing  Cardiovascular: Negative for chest pain, palpitations and leg swelling  Gastrointestinal: Negative for abdominal pain, blood in stool, constipation, diarrhea, nausea and vomiting  Genitourinary: Negative for frequency and urgency  Musculoskeletal: Negative  Skin: Negative  Neurological: Negative for dizziness, light-headedness and numbness  Objective:      /80 (BP Location: Left arm, Patient Position: Sitting, Cuff Size: Large)   Pulse 75   Temp (!) 96 1 °F (35 6 °C)   Ht 6' 2" (1 88 m)   Wt (!) 145 kg (320 lb)   SpO2 97%   BMI 41 09 kg/m²          Physical Exam  Vitals and nursing note reviewed  Constitutional:       General: He is not in acute distress  Appearance: Normal appearance     HENT:      Head: Normocephalic and atraumatic  Nose: Nose normal    Eyes:      Pupils: Pupils are equal, round, and reactive to light  Cardiovascular:      Rate and Rhythm: Normal rate and regular rhythm  Heart sounds: Normal heart sounds  No murmur heard  Pulmonary:      Effort: Pulmonary effort is normal  No respiratory distress  Breath sounds: Normal breath sounds  No wheezing, rhonchi or rales  Abdominal:      General: Bowel sounds are normal  There is no distension  Palpations: Abdomen is soft  Tenderness: There is no abdominal tenderness  There is no guarding  Hernia: No hernia is present  Musculoskeletal:         General: Normal range of motion  Cervical back: Normal range of motion and neck supple  Right lower leg: No edema  Left lower leg: No edema  Skin:     General: Skin is warm and dry  Neurological:      Mental Status: He is alert and oriented to person, place, and time     Psychiatric:         Mood and Affect: Mood and affect normal

## 2022-04-05 ENCOUNTER — OFFICE VISIT (OUTPATIENT)
Dept: OBGYN CLINIC | Facility: CLINIC | Age: 47
End: 2022-04-05
Payer: COMMERCIAL

## 2022-04-05 ENCOUNTER — APPOINTMENT (OUTPATIENT)
Dept: RADIOLOGY | Facility: CLINIC | Age: 47
End: 2022-04-05
Payer: COMMERCIAL

## 2022-04-05 VITALS
HEIGHT: 74 IN | DIASTOLIC BLOOD PRESSURE: 86 MMHG | WEIGHT: 315 LBS | SYSTOLIC BLOOD PRESSURE: 128 MMHG | BODY MASS INDEX: 40.43 KG/M2 | HEART RATE: 79 BPM

## 2022-04-05 DIAGNOSIS — M79.644 THUMB PAIN, RIGHT: ICD-10-CM

## 2022-04-05 DIAGNOSIS — M67.441 GANGLION CYST OF JOINT OF FINGER OF RIGHT HAND: Primary | ICD-10-CM

## 2022-04-05 PROCEDURE — 99213 OFFICE O/P EST LOW 20 MIN: CPT | Performed by: ORTHOPAEDIC SURGERY

## 2022-04-05 PROCEDURE — 3008F BODY MASS INDEX DOCD: CPT | Performed by: ORTHOPAEDIC SURGERY

## 2022-04-05 PROCEDURE — 3079F DIAST BP 80-89 MM HG: CPT | Performed by: ORTHOPAEDIC SURGERY

## 2022-04-05 PROCEDURE — 1036F TOBACCO NON-USER: CPT | Performed by: ORTHOPAEDIC SURGERY

## 2022-04-05 PROCEDURE — 73140 X-RAY EXAM OF FINGER(S): CPT

## 2022-04-05 PROCEDURE — 3074F SYST BP LT 130 MM HG: CPT | Performed by: ORTHOPAEDIC SURGERY

## 2022-04-05 PROCEDURE — 20610 DRAIN/INJ JOINT/BURSA W/O US: CPT | Performed by: ORTHOPAEDIC SURGERY

## 2022-04-05 NOTE — PROGRESS NOTES
Assessment:   Diagnosis ICD-10-CM Associated Orders   1  Ganglion cyst of IP joint of thumb of right hand  M67 [id number] XR thumb right first digit-min 2v       Plan:  · Patient has a ganglion cyst about the IP joint of the right thumb  This was aspirated today in the office and is documented approprietly below  He tolerated the aspiration quite well  · Apply ice prn for pain relief  · Explained to the patient that the cyst may reoccur but if it remains asymptomatic then a surgical procedure is not warranted  He understood and all questions were answered  The patient has a ganglion cyst along the IP joint of his thumb  This was aspirated of 1 cc of scant gelatinous fluid  Local wound care was discussed  Return back in 6 weeks for evaluation  If it recurs back sooner, he will not hesitate to let us know  Future treatment options include repeat aspirations and or surgical excision  The patient is in agreement with this plan    To do next visit:  Return in about 6 weeks (around 5/17/2022) for Recheck of right thumb  The above stated was discussed in layman's terms and the patient expressed understanding  All questions were answered to the patient's satisfaction  Scribe Attestation    I,:  Preston Dorsey am acting as a scribe while in the presence of the attending physician :       I,:  Steff Mar, DO personally performed the services described in this documentation    as scribed in my presence :             Subjective:   Edyta Aguilera is a 48 y o  male who presents today for an initial visit for his right thumb  Patient reports an a asymptomatic cyst about the right thumb for the past 6 months  Patient denies any acute injury or trauma  He states that the only time that the cyst bothers him is if he bumps it on something  He denies numbness or tingling  No fevers or chills         Review of systems negative unless otherwise specified in HPI  Review of Systems   Constitutional: Negative for chills, fatigue, fever and unexpected weight change  HENT: Negative for hearing loss, nosebleeds and sore throat  Eyes: Negative for pain, redness and visual disturbance  Respiratory: Negative for cough, shortness of breath and wheezing  Cardiovascular: Negative for chest pain, palpitations and leg swelling  Gastrointestinal: Negative for abdominal pain, nausea and vomiting  Endocrine: Negative for polydipsia and polyuria  Genitourinary: Negative for frequency and urgency  Skin: Negative for color change, rash and wound  Neurological: Negative for dizziness, weakness, numbness and headaches  Psychiatric/Behavioral: Negative for behavioral problems, self-injury and suicidal ideas  Past Medical History:   Diagnosis Date    CPAP (continuous positive airway pressure) dependence     Diabetes ([address] )     Gout     HTN (hypertension)     Hyperlipidemia     Kidney stone     RIGHT    Morbid obesity with BMI of 40 0-44 9, adult (HCC)     SANCHO on CPAP        Past Surgical History:   Procedure Laterality Date    GANGLION CYST EXCISION Left 2/19/2019    Procedure: WRIST GANGLION CYST EXCISION;  Surgeon: Johnie Mcgowan MD;  Location: [facility];  Service: Orthopedics    UT CYSTO/URETERO W/LITHOTRIPSY &INDWELL STENT INSRT Right 5/1/2017    Procedure: CYSTOSCOPY URETEROSCOPY WITH LITHOTRIPSY HOLMIUM LASER, RETROGRADE PYELOGRAM AND INSERTION STENT URETERAL;  Surgeon: Jean-Paul Monsivais MD;  [facility]: [facility];  Service: Urology    Tony ESWL Left 6/19/2017    Procedure: Manuel Goodman SHOCKWAVE (ESWL);   Surgeon: Jean-Paul Monsivais MD;  [facility];  Service: Urology    UT WRIST Autumn  LIG Left 2/19/2019    Procedure: ENDOSCOPIC CARPAL TUNNEL RELEASE;  Surgeon: Johnie Mcgowan MD;  Location: [facility];  Service: Orthopedics       Family History   Problem Relation Age of Onset    Bone cancer Father     Prostate cancer Maternal Grandfather     Cancer Family     Hypertension Family        Social History     Occupational History    Not on file   Tobacco Use    Smoking status: Former Smoker     Quit date:      Years since quittin 2    Smokeless tobacco: Former User    Tobacco comment: Former Smoker as per allscripts   Substance and Sexual Activity    Alcohol use: Yes     Comment: socially    Drug use: No    Sexual activity: Not on file         Current Outpatient Medications:     allopurinol (ZYLOPRIM) 300 mg tablet, Take 1 tablet (300 mg total) by mouth daily, Disp: 90 tablet, Rfl: 3    amLODIPine (NORVASC) 5 mg tablet, Take 1 tablet (5 mg total) by mouth daily, Disp: 90 tablet, Rfl: 3    atorvastatin (LIPITOR) 80 mg tablet, Take 1 tablet (80 mg total) by mouth daily, Disp: 90 tablet, Rfl: 3    Blood Glucose Monitoring Suppl (ONE TOUCH ULTRA 2) w/Device KIT, 1 kit by Does not apply route daily, Disp: , Rfl:     Choline Fenofibrate (Fenofibric Acid) 135 MG CPDR, Take 1 capsule (135 mg total) by mouth daily, Disp: 90 capsule, Rfl: 3    cyclobenzaprine (FLEXERIL) 10 mg tablet, Take 1 tablet (10 mg total) by mouth 3 (three) times a day as needed for muscle spasms, Disp: 90 tablet, Rfl: 0    Empagliflozin (Jardiance) 10 MG TABS, Take 1 tablet (10 mg total) by mouth every morning, Disp: 90 tablet, Rfl: 3    FLUCELVAX 0 5 ML CATRACHO, , Disp: , Rfl:     glucose blood test strip, Use 1 each daily, Disp: 100 each, Rfl: 5    ibuprofen (MOTRIN) 600 mg tablet, TAKE 1 TABLET EVERY 8 HOURS, Disp: 90 tablet, Rfl: 11    Icosapent Ethyl (Vascepa) 1 g CAPS, Take 1 capsule (1 g total) by mouth 2 (two) times a day, Disp: 360 capsule, Rfl: 1    indomethacin (INDOCIN SR) 75 mg CR capsule, Take 1 capsule (75 mg total) by mouth daily as needed (PRN for flare-ups), Disp: 90 capsule, Rfl: 0    losartan-hydrochlorothiazide (HYZAAR) 100-25 MG per tablet, Take 1 tablet by mouth daily, Disp: 90 tablet, Rfl: 3    metFORMIN (GLUCOPHAGE-XR) 500 mg 24 hr tablet, Take 2 tablets (1,000 mg total) by mouth 2 (two) times a day with meals, Disp: 360 tablet, Rfl: 3    ONETOUCH DELICA LANCETS 96R MISC, 1 Units by Does not apply route daily, Disp: , Rfl:     sildenafil (VIAGRA) 100 mg tablet, Take 1 tablet (100 mg total) by mouth daily as needed for erectile dysfunction, Disp: 30 tablet, Rfl: 3    tamsulosin (FLOMAX) 0 4 mg, Take 1 capsule (0 4 mg total) by mouth daily with dinner, Disp: 90 capsule, Rfl: 3    Trulicity 1 5 OP/1 2FU SOPN, Inject 0 5 mL (1 5 mg total) under the skin once a week, Disp: 6 mL, Rfl: 3    No Known Allergies         Vitals:    04/05/22 0801   BP: 128/86   Pulse: 79       Objective:                    Right Hand Exam     Tenderness   The patient is experiencing no tenderness  Range of Motion   The patient has normal right wrist ROM  Other   Erythema: absent  Sensation: normal  Pulse: present    Comments:  Noted benign ganglion cyst about the IP joint of the right thumb  Diagnostics, reviewed and taken today if performed as documented: The attending physician has personally reviewed the pertinent films in PACS and interpretation is as follows:    X Ray Right Hand 4/5/2022 demonstrates moderate osteoarthritis of the IP joint  No other acute osseous abnormalities    Procedures, if performed today:    Hand/upper extremity injection  Universal Protocol:  Consent: Verbal consent obtained    Risks and benefits: risks, benefits and alternatives were discussed  Consent given by: patient    Supporting Documentation  Indications: diagnostic, joint swelling and tendon swelling   Procedure Details  Condition comment: Right thumb IP joint   Preparation: Patient was prepped and draped in the usual sterile fashion  Needle size: 18 G  Ultrasound guidance: no  Approach: radial  Aspirate amount: 1 mL  Aspirate: clear    Patient tolerance: patient tolerated the procedure well with no immediate complications  Dressing:  Sterile dressing applied Portions of the record may have been created with voice recognition software  Occasional wrong word or "sound a like" substitutions may have occurred due to the inherent limitations of voice recognition software  Read the chart carefully and recognize, using context, where substitutions have occurred

## 2022-05-17 ENCOUNTER — OFFICE VISIT (OUTPATIENT)
Dept: SLEEP CENTER | Facility: CLINIC | Age: 47
End: 2022-05-17
Payer: COMMERCIAL

## 2022-05-17 VITALS
HEIGHT: 74 IN | BODY MASS INDEX: 40.43 KG/M2 | WEIGHT: 315 LBS | HEART RATE: 69 BPM | SYSTOLIC BLOOD PRESSURE: 130 MMHG | DIASTOLIC BLOOD PRESSURE: 80 MMHG | OXYGEN SATURATION: 96 %

## 2022-05-17 DIAGNOSIS — G47.33 OSA (OBSTRUCTIVE SLEEP APNEA): ICD-10-CM

## 2022-05-17 PROCEDURE — 3008F BODY MASS INDEX DOCD: CPT | Performed by: INTERNAL MEDICINE

## 2022-05-17 PROCEDURE — 3075F SYST BP GE 130 - 139MM HG: CPT | Performed by: INTERNAL MEDICINE

## 2022-05-17 PROCEDURE — 99214 OFFICE O/P EST MOD 30 MIN: CPT | Performed by: INTERNAL MEDICINE

## 2022-05-17 PROCEDURE — 3079F DIAST BP 80-89 MM HG: CPT | Performed by: INTERNAL MEDICINE

## 2022-05-17 PROCEDURE — 1036F TOBACCO NON-USER: CPT | Performed by: INTERNAL MEDICINE

## 2022-05-17 NOTE — PROGRESS NOTES
Consultation - [facility] Center   Charmayne Boyers : 1975  MRN: [id number]      Assessment:  The patient has a history of previously diagnosed severe obstructive sleep apnea  He has been using CPAP 12 cm for some time, but his wife notices that he is currently breathing through his mouth and snoring more than he has in the past     Plan:  Positive airway pressure retitration study  Try chinstrap and/or fullface mask   The patient is not a candidate for surgery  Start humidification at level two and set the heated hose at level two  Use Ayr nasal gel  Follow up: After testing    History of Present Illness:   50 y o male with a history of severe obstructive sleep apnea diagnosed at [facility] approximately 10 years ago  He was prescribed CPAP at 12 cm, which he has been using ever since  According to his wife, he has begun snoring and breathing through his mouth  He is currently using a nasal Quattro interface  He complains of nasal dryness, but is not using his humidifier due to problems but condensation        Review of Systems      Genitourinary difficulty with erection   Cardiology none   Gastrointestinal none   Neurology awaken with headache   Constitutional none   Integumentary none   Psychiatry none   Musculoskeletal none   Pulmonary none   ENT none   Endocrine none   Hematological none           I have reviewed and updated the review of systems as necessary    Historical Information    Past Medical History:  Diabetes, hypertension    Family History: non-contributory    Social History     Socioeconomic History    Marital status: /Civil Union     Spouse name: None    Number of children: None    Years of education: None    Highest education level: None   Occupational History    None   Tobacco Use    Smoking status: Former Smoker     Quit date:      Years since quittin 3    Smokeless tobacco: Former User    Tobacco comment: Former Smoker as per allscripts   Substance and Sexual Activity    Alcohol use: Yes     Comment: socially    Drug use: No    Sexual activity: None   Other Topics Concern    None   Social History Narrative    Daily Coffee Consumption    Daily Tea Consumption     Social Determinants of Health     Financial Resource Strain: Not on file   Food Insecurity: Not on file   Transportation Needs: Not on file   Physical Activity: Not on file   Stress: Not on file   Social Connections: Not on file   Intimate Partner Violence: Not on file   Housing Stability: Not on file         Sleep Schedule: unremarkable    Snoring:  Yes    Witnessed Apnea:  No    Medications/Allergies:    Current Outpatient Medications:     allopurinol (ZYLOPRIM) 300 mg tablet, Take 1 tablet (300 mg total) by mouth daily, Disp: 90 tablet, Rfl: 3    amLODIPine (NORVASC) 5 mg tablet, Take 1 tablet (5 mg total) by mouth daily, Disp: 90 tablet, Rfl: 3    atorvastatin (LIPITOR) 80 mg tablet, Take 1 tablet (80 mg total) by mouth daily, Disp: 90 tablet, Rfl: 3    Blood Glucose Monitoring Suppl (ONE TOUCH ULTRA 2) w/Device KIT, 1 kit by Does not apply route daily, Disp: , Rfl:     Choline Fenofibrate (Fenofibric Acid) 135 MG CPDR, Take 1 capsule (135 mg total) by mouth daily, Disp: 90 capsule, Rfl: 3    cyclobenzaprine (FLEXERIL) 10 mg tablet, Take 1 tablet (10 mg total) by mouth 3 (three) times a day as needed for muscle spasms, Disp: 90 tablet, Rfl: 0    Empagliflozin (Jardiance) 10 MG TABS, Take 1 tablet (10 mg total) by mouth every morning, Disp: 90 tablet, Rfl: 3    glucose blood test strip, Use 1 each daily, Disp: 100 each, Rfl: 5    ibuprofen (MOTRIN) 600 mg tablet, TAKE 1 TABLET EVERY 8 HOURS, Disp: 90 tablet, Rfl: 11    losartan-hydrochlorothiazide (HYZAAR) 100-25 MG per tablet, Take 1 tablet by mouth daily, Disp: 90 tablet, Rfl: 3    metFORMIN (GLUCOPHAGE-XR) 500 mg 24 hr tablet, Take 2 tablets (1,000 mg total) by mouth 2 (two) times a day with meals, Disp: 360 tablet, Rfl: 3    ONETOUCH DELICA LANCETS 73J MISC, 1 Units by Does not apply route daily, Disp: , Rfl:     sildenafil (VIAGRA) 100 mg tablet, Take 1 tablet (100 mg total) by mouth daily as needed for erectile dysfunction, Disp: 30 tablet, Rfl: 3    tamsulosin (FLOMAX) 0 4 mg, Take 1 capsule (0 4 mg total) by mouth daily with dinner, Disp: 90 capsule, Rfl: 3    Trulicity 1 5 ET/2 9ED SOPN, Inject 0 5 mL (1 5 mg total) under the skin once a week, Disp: 6 mL, Rfl: 3    FLUCELVAX 0 5 ML CATRACHO, , Disp: , Rfl:     Icosapent Ethyl (Vascepa) 1 g CAPS, Take 1 capsule (1 g total) by mouth 2 (two) times a day (Patient not taking: Reported on 5/17/2022), Disp: 360 capsule, Rfl: 1    indomethacin (INDOCIN SR) 75 mg CR capsule, Take 1 capsule (75 mg total) by mouth daily as needed (PRN for flare-ups) (Patient not taking: Reported on 5/17/2022), Disp: 90 capsule, Rfl: 0        No notes on file                  Objective:    Vital Signs:   Vitals:    05/17/22 0900   BP: 130/80   Pulse: 69   SpO2: 96%   Weight: (!) 144 kg (316 lb 9 6 oz)   Height: 6' 2" (1 88 m)     Neck Circumference: 21 5      [place] Sleepiness Scale: Total score: 7    Physical Exam:    General: Alert, appropriate, cooperative, overweight    Head: NC/AT, no retrognathia    Nose: No septal deviation, nares not obstructed, mucosa normal    Throat: Airway diminished, tongue base thickened, no tonsils visualized    Neck: Normal, no thyromegaly or lymphadenopathy, no JVD    Heart: RR, normal S1 and S2, no murmurs    Chest: Clear bilaterally    Extremity: No clubbing, cyanosis, no edema    Skin: Warm, dry    Neuro: No motor abnormalities, cranial nerves appear intact    Sleep Study Results:   Unavailable  PAP Pressure: CPAP: 12 cm  DME Provider: Karina Mccloud (the patient wishes to switch to another company)    Counseling / Coordination of Care  A description of the counseling / coordination of care:   We discussed the pathophysiology of obstructive sleep apnea as well as the possible treatment options  We also discussed the rationale for positive airway pressure therapy  Board Certified Sleep Specialist    Portions of the record may have been created with voice recognition software  Occasional wrong word or "sound a like" substitutions may have occurred due to the inherent limitations of voice recognition software  Read the chart carefully and recognize, using context, where substitutions have occurred

## 2022-05-25 ENCOUNTER — TELEPHONE (OUTPATIENT)
Dept: SLEEP CENTER | Facility: CLINIC | Age: 47
End: 2022-05-25

## 2022-05-26 DIAGNOSIS — N52.8 OTHER MALE ERECTILE DYSFUNCTION: ICD-10-CM

## 2022-05-26 DIAGNOSIS — M54.10 ACUTE LOW BACK PAIN WITH RADICULAR SYMPTOMS, DURATION LESS THAN 6 WEEKS: ICD-10-CM

## 2022-05-26 RX ORDER — CYCLOBENZAPRINE HCL 10 MG
10 TABLET ORAL 3 TIMES DAILY PRN
Qty: 90 TABLET | Refills: 0 | Status: SHIPPED | OUTPATIENT
Start: 2022-05-26 | End: 2022-07-26

## 2022-06-07 ENCOUNTER — RA CDI HCC (OUTPATIENT)
Dept: OTHER | Facility: HOSPITAL | Age: 47
End: 2022-06-07

## 2022-06-07 NOTE — PROGRESS NOTES
[address]  coding opportunities          Chart Reviewed number of suggestions sent to Provider: 2     Patients Insurance        Commercial Insurance: Donna 93     [id number]    [id number]

## 2022-06-13 PROBLEM — E11.69 TYPE 2 DIABETES MELLITUS WITH OBESITY (HCC): Status: RESOLVED | Noted: 2021-12-07 | Resolved: 2022-06-13

## 2022-06-13 PROBLEM — E78.2 MIXED HYPERLIPIDEMIA: Status: RESOLVED | Noted: 2018-05-29 | Resolved: 2022-06-13

## 2022-06-13 PROBLEM — IMO0001 RADICULAR PAIN OF RIGHT LOWER BACK: Status: RESOLVED | Noted: 2018-07-05 | Resolved: 2022-06-13

## 2022-06-13 PROBLEM — R71.8 MICROCYTOSIS: Status: RESOLVED | Noted: 2019-01-22 | Resolved: 2022-06-13

## 2022-06-13 PROBLEM — M54.50 LOWER BACK PAIN: Status: RESOLVED | Noted: 2018-05-29 | Resolved: 2022-06-13

## 2022-06-13 PROBLEM — E66.9 TYPE 2 DIABETES MELLITUS WITH OBESITY (HCC): Status: RESOLVED | Noted: 2021-12-07 | Resolved: 2022-06-13

## 2022-09-19 ENCOUNTER — HOSPITAL ENCOUNTER (OUTPATIENT)
Dept: SLEEP CENTER | Facility: CLINIC | Age: 47
Discharge: HOME | End: 2022-09-19
Payer: COMMERCIAL

## 2022-09-19 DIAGNOSIS — G47.33 OSA (OBSTRUCTIVE SLEEP APNEA): ICD-10-CM

## 2022-09-19 PROCEDURE — 95811 POLYSOM 6/>YRS CPAP 4/> PARM: CPT

## 2022-09-19 PROCEDURE — 95811 POLYSOM 6/>YRS CPAP 4/> PARM: CPT | Performed by: INTERNAL MEDICINE

## 2022-09-20 NOTE — PROGRESS NOTES
Sleep Study Documentation    Pre-Sleep Study       Sleep testing procedure explained to patient:YES    Patient napped prior to study:NO    Caffeine:Dayshift worker after 12PM   Caffeine use:YES- coffee  6 to 18 ounces    Alcohol:Dayshift workers after 5PM: Alcohol use:NO    Typical day for patient:YES       Study Documentation    Sleep Study Indications: G47 33    Sleep Study: Treatment   Optimal PAP pressure:12   Leak:Large  Snore:Eliminated  REM Obtained:yes  Supplemental O2: no    Minimum SaO2 87  Baseline SaO2 95  PAP mask tried (list all) Resmed Airfit F20  PAP mask choice (final) Resmed Airfit F20  PAP mask type:full face  PAP pressure at which snoring was eliminated 7  Minimum SaO2 at final PAP pressure 91  Mode of Therapy:CPAP  ETCO2:No  CPAP changed to BiPAP:No    Mode of Therapy:    EKG abnormalities: no     EEG abnormalities: no    Sleep Study Recorded < 2 hours: N/A    Sleep Study Recorded > 2 hours but incomplete study: N/A    Sleep Study Recorded 6 hours but no sleep obtained: NO    Patient classification: employed       Post-Sleep Study    Medication used at bedtime or during sleep study:YES other prescription medications    Patient reports time it took to fall asleep:30 to 60 minutes    Patient reports waking up during study:3 or more times  Patient reports returning to sleep in 10 to 30 minutes  Patient reports sleeping 6 to 8 hours without dreaming  Patient reports sleep during study:typical    Patient rated sleepiness: Somewhat sleepy or tired    PAP treatment:yes: Post PAP treatment patient reports feeling unchanged and would wear PAP mask at home

## 2022-09-26 ENCOUNTER — TELEPHONE (OUTPATIENT)
Dept: UROLOGY | Facility: MEDICAL CENTER | Age: 47
End: 2022-09-26

## 2022-09-26 ENCOUNTER — TELEPHONE (OUTPATIENT)
Dept: FAMILY MEDICINE CLINIC | Facility: CLINIC | Age: 47
End: 2022-09-26

## 2022-09-26 DIAGNOSIS — N40.1 BPH WITH OBSTRUCTION/LOWER URINARY TRACT SYMPTOMS: Primary | ICD-10-CM

## 2022-09-26 DIAGNOSIS — N13.8 BPH WITH OBSTRUCTION/LOWER URINARY TRACT SYMPTOMS: Primary | ICD-10-CM

## 2022-09-26 DIAGNOSIS — M10.9 GOUT, UNSPECIFIED CAUSE, UNSPECIFIED CHRONICITY, UNSPECIFIED SITE: Primary | ICD-10-CM

## 2022-09-26 RX ORDER — INDOMETHACIN 50 MG/1
50 CAPSULE ORAL 2 TIMES DAILY PRN
Qty: 60 CAPSULE | Refills: 2 | Status: SHIPPED | OUTPATIENT
Start: 2022-09-26

## 2022-09-26 RX ORDER — INDOMETHACIN 50 MG/1
50 CAPSULE ORAL 2 TIMES DAILY PRN
Qty: 60 CAPSULE | Refills: 2 | Status: SHIPPED | OUTPATIENT
Start: 2022-09-26 | End: 2022-09-26 | Stop reason: SDUPTHER

## 2022-09-26 NOTE — TELEPHONE ENCOUNTER
Script sent   Should be sure to take with food and not combine with any other anti-inflammatories (Ibuprofen, Advil, Aleve, etc )

## 2022-09-26 NOTE — TELEPHONE ENCOUNTER
Pt requesting refill of Indomethacin  Says that he was prescribed in the past but has not had it in a while

## 2022-09-27 ENCOUNTER — OFFICE VISIT (OUTPATIENT)
Dept: URGENT CARE | Facility: CLINIC | Age: 47
End: 2022-09-27
Payer: COMMERCIAL

## 2022-09-27 VITALS
SYSTOLIC BLOOD PRESSURE: 142 MMHG | HEIGHT: 74 IN | WEIGHT: 315 LBS | RESPIRATION RATE: 19 BRPM | HEART RATE: 80 BPM | OXYGEN SATURATION: 96 % | DIASTOLIC BLOOD PRESSURE: 88 MMHG | BODY MASS INDEX: 40.43 KG/M2 | TEMPERATURE: 97.5 F

## 2022-09-27 DIAGNOSIS — M54.50 ACUTE LEFT-SIDED LOW BACK PAIN, UNSPECIFIED WHETHER SCIATICA PRESENT: Primary | ICD-10-CM

## 2022-09-27 PROCEDURE — 99213 OFFICE O/P EST LOW 20 MIN: CPT

## 2022-09-27 PROCEDURE — 96372 THER/PROPH/DIAG INJ SC/IM: CPT

## 2022-09-27 RX ORDER — METHOCARBAMOL 500 MG/1
500 TABLET, FILM COATED ORAL 3 TIMES DAILY
Qty: 20 TABLET | Refills: 0 | Status: SHIPPED | OUTPATIENT
Start: 2022-09-27 | End: 2022-10-04

## 2022-09-27 RX ORDER — PREDNISONE 20 MG/1
40 TABLET ORAL DAILY
Qty: 10 TABLET | Refills: 0 | Status: SHIPPED | OUTPATIENT
Start: 2022-09-27 | End: 2022-10-02

## 2022-09-27 RX ORDER — KETOROLAC TROMETHAMINE 30 MG/ML
30 INJECTION, SOLUTION INTRAMUSCULAR; INTRAVENOUS ONCE
Status: COMPLETED | OUTPATIENT
Start: 2022-09-27 | End: 2022-09-27

## 2022-09-27 RX ADMIN — KETOROLAC TROMETHAMINE 30 MG: 30 INJECTION, SOLUTION INTRAMUSCULAR; INTRAVENOUS at 12:18

## 2022-09-27 NOTE — PROGRESS NOTES
[facility] [facility]        NAME: Cristina Colon is a 50 y o  male  : 1975    MRN: [id number]  DATE: 2022  TIME: 12:20 PM    Assessment and Plan   Acute left-sided low back pain, unspecified whether sciatica present [M54 50]  1  Acute left-sided low back pain, unspecified whether sciatica present  ketorolac (TORADOL) injection 30 mg    predniSONE 20 mg tablet    methocarbamol (ROBAXIN) 500 mg tablet         Patient Instructions     Patient Instructions     -You have been given a toradol shot today  This is an antiinflammatory medication, similar to ibuprofen  Do not take any other NSAIDs today (EX: ibuprofen, aleve, motrin)  You may take tylenol    -Take Prednisone as prescribed (Starting tomorrow  Take first thing in the morning with food ) Do not take Prednisone with other NSAIDs  -Take Robaxin as prescribed  Do not drive or operate heavy machinery after taking this medication as it will cause drowsiness  Do not drink alcohol while taking this medication    -Alternate ice and heat  -Avoid heavy lifting  -OTC lidoderm patch or capsacin cream    -Please call and schedule appointment with [facility]    -If you develop any new or worsening symptoms such as fever, vomiting, increased pain, lower extremity weakness, or loss of bowel/bladder control, proceed to ER for further evaluation  Acute Low Back Pain   AMBULATORY CARE:   Acute low back pain  is sudden discomfort that lasts up to 6 weeks and makes activity difficult    Common symptoms include the following:   · Back stiffness or spasms    · Pain down the back or side of one leg    · Holding yourself in an unusual position or posture to decrease your back pain    · Not being able to find a sitting position that is comfortable    · Slow increase in your pain for 24 to 48 hours after you stress your back    · Tenderness on your lower back or severe pain when you move your back    Seek care immediately if:   · You have severe pain     · You have sudden stiffness and heaviness on both buttocks down to both legs  · You have numbness or weakness in one leg, or pain in both legs  · You have numbness in your genital area or across your lower back  · You cannot control your urine or bowel movements  Call your doctor if:   · You have a fever  · You have pain at night or when you rest     · Your pain does not get better with treatment  · You have pain that worsens when you cough or sneeze  · You suddenly feel something pop or snap in your back  · You have questions or concerns about your condition or care  The goal of treatment for acute low back pain  is to relieve your pain and help you be able to do your regular activities  Most people with acute lower back pain get better within 4 to 6 weeks  You may need any of the following:  · NSAIDs , such as ibuprofen, help decrease swelling, pain, and fever  This medicine is available with or without a doctor's order  NSAIDs can cause stomach bleeding or kidney problems in certain people  If you take blood thinner medicine, always ask your healthcare provider if NSAIDs are safe for you  Always read the medicine label and follow directions  · Acetaminophen  decreases pain and fever  It is available without a doctor's order  Ask how much to take and how often to take it  Follow directions  Read the labels of all other medicines you are using to see if they also contain acetaminophen, or ask your doctor or pharmacist  Acetaminophen can cause liver damage if not taken correctly  Do not use more than 4 grams (4,000 milligrams) total of acetaminophen in one day  · Muscle relaxers  decrease pain by relaxing the muscles in your lower spine  · Prescription pain medicine  may be given  Ask your healthcare provider how to take this medicine safely  Some prescription pain medicines contain acetaminophen   Do not take other medicines that contain acetaminophen without talking to your healthcare provider  Too much acetaminophen may cause liver damage  Prescription pain medicine may cause constipation  Ask your healthcare provider how to prevent or treat constipation  Manage your symptoms:   · Stay active  as much as you can without causing more pain  Bed rest could make your back pain worse  Start with some light exercises such as walking  Avoid heavy lifting until your pain is gone  Ask for more information about the activities or exercises that are right for you  · Apply heat  on your back for 20 to 30 minutes every 2 hours for as many days as directed  Heat helps decrease pain and muscle spasms  Alternate heat and ice  · Apply ice  on your back for 15 to 20 minutes every hour or as directed  Use an ice pack, or put crushed ice in a plastic bag  Cover it with a towel before you apply it to your skin  Ice helps prevent tissue damage and decreases swelling and pain  Prevent acute low back pain:   1  Use proper body mechanics  ? Bend at the hips and knees when you  objects  Do not bend from the waist  Use your leg muscles as you lift the load  Do not use your back  Keep the object close to your chest as you lift it  Try not to twist or lift anything above your waist          ? Change your position often when you stand for long periods of time  Rest one foot on a small box or footrest, and then switch to the other foot often  ? Try not to sit for long periods of time  When you do, sit in a straight-backed chair with your feet flat on the floor  Never reach, pull, or push while you are sitting  2  Do exercises that strengthen your back muscles  Warm up before you exercise  Ask your healthcare provider the best exercises for you  3  Maintain a healthy weight  Ask your healthcare provider what a healthy weight is for you  Ask him or her to help you create a weight loss plan if you are overweight      Follow up with your doctor as directed:  Return for a follow-up visit if you still have pain after 1 to 3 weeks of treatment  You may need to visit an orthopedist if your back pain lasts longer than 12 weeks  Write down your questions so you remember to ask them during your visits  © Copyright [employer] 2022 Information is for End User's use only and may not be sold, redistributed or otherwise used for commercial purposes  All illustrations and images included in CareNotes® are the copyrighted property of A [employer] M [employer] Inc  or Cece Lee   The above information is an  only  It is not intended as medical advice for individual conditions or treatments  Talk to your doctor, nurse or pharmacist before following any medical regimen to see if it is safe and effective for you  Follow up with PCP in 3-5 days  Proceed to  ER if symptoms worsen  Chief Complaint     Chief Complaint   Patient presents with    Back Pain     Left lower back pain  Started on Sunday  No trauma to back  History of Present Illness       Back Pain  This is a new problem  Episode onset: 2 days ago, no injury or trauma, was camping this weekend  The problem occurs constantly  The problem has been gradually worsening since onset  The pain is present in the lumbar spine  The quality of the pain is described as stabbing  The pain radiates to the left thigh  The pain is at a severity of 9/10  The pain is moderate  The pain is worse during the night  The symptoms are aggravated by lying down and position  Pertinent negatives include no abdominal pain, bladder incontinence, bowel incontinence, chest pain, dysuria, fever, headaches, leg pain, numbness, perianal numbness, tingling or weakness  Risk factors include obesity  He has tried NSAIDs, muscle relaxant and heat (Flexeril, indomethacin, ibuprofen) for the symptoms  The treatment provided no relief  Review of Systems   Review of Systems   Constitutional: Negative for chills, fatigue and fever     HENT: Negative for congestion, ear pain, rhinorrhea, sinus pain and sore throat  Eyes: Negative for redness  Respiratory: Negative for cough, chest tightness, shortness of breath and wheezing  Cardiovascular: Negative for chest pain and palpitations  Gastrointestinal: Negative for abdominal pain, bowel incontinence, diarrhea, nausea and vomiting  Genitourinary: Negative for bladder incontinence, decreased urine volume, dysuria, flank pain and hematuria  Musculoskeletal: Positive for back pain  Negative for arthralgias and myalgias  Skin: Negative for color change and rash  Neurological: Negative for tingling, weakness, light-headedness, numbness and headaches           Current Medications       Current Outpatient Medications:     allopurinol (ZYLOPRIM) 300 mg tablet, Take 1 tablet (300 mg total) by mouth daily, Disp: 90 tablet, Rfl: 3    amLODIPine (NORVASC) 5 mg tablet, Take 1 tablet (5 mg total) by mouth daily, Disp: 90 tablet, Rfl: 3    atorvastatin (LIPITOR) 80 mg tablet, Take 1 tablet (80 mg total) by mouth daily, Disp: 90 tablet, Rfl: 3    Blood Glucose Monitoring Suppl (ONE TOUCH ULTRA 2) w/Device KIT, 1 kit by Does not apply route daily, Disp: , Rfl:     Choline Fenofibrate (Fenofibric Acid) 135 MG CPDR, Take 1 capsule (135 mg total) by mouth daily, Disp: 90 capsule, Rfl: 3    Empagliflozin (Jardiance) 10 MG TABS, Take 1 tablet (10 mg total) by mouth every morning, Disp: 90 tablet, Rfl: 3    glucose blood test strip, Use 1 each daily, Disp: 100 each, Rfl: 5    ibuprofen (MOTRIN) 600 mg tablet, TAKE 1 TABLET EVERY 8 HOURS, Disp: 90 tablet, Rfl: 11    indomethacin (INDOCIN) 50 mg capsule, Take 1 capsule (50 mg total) by mouth 2 (two) times a day as needed for mild pain, Disp: 60 capsule, Rfl: 2    losartan-hydrochlorothiazide (HYZAAR) 100-25 MG per tablet, Take 1 tablet by mouth daily, Disp: 90 tablet, Rfl: 3    metFORMIN (GLUCOPHAGE-XR) 500 mg 24 hr tablet, Take 2 tablets (1,000 mg total) by mouth 2 (two) times a day with meals, Disp: 360 tablet, Rfl: 3    methocarbamol (ROBAXIN) 500 mg tablet, Take 1 tablet (500 mg total) by mouth 3 (three) times a day, Disp: 20 tablet, Rfl: 0    ONETOUCH DELICA LANCETS 53S MISC, 1 Units by Does not apply route daily, Disp: , Rfl:     predniSONE 20 mg tablet, Take 2 tablets (40 mg total) by mouth daily for 5 days, Disp: 10 tablet, Rfl: 0    sildenafil (VIAGRA) 100 mg tablet, Take 1 tablet (100 mg total) by mouth daily as needed for erectile dysfunction, Disp: 30 tablet, Rfl: 3    tamsulosin (FLOMAX) 0 4 mg, Take 1 capsule (0 4 mg total) by mouth daily with dinner, Disp: 90 capsule, Rfl: 3    Trulicity 1 5 XH/2 8SO SOPN, Inject 0 5 mL (1 5 mg total) under the skin once a week, Disp: 6 mL, Rfl: 3    Icosapent Ethyl 1 g CAPS, TAKE 1 CAPSULE TWICE A DAY (Patient not taking: Reported on 9/27/2022), Disp: 180 capsule, Rfl: 1  No current facility-administered medications for this visit      Current Allergies     Allergies as of 09/27/2022    (No Known Allergies)            The following portions of the patient's history were reviewed and updated as appropriate: allergies, current medications, past family history, past medical history, past social history, past surgical history and problem list      Past Medical History:   Diagnosis Date    CPAP (continuous positive airway pressure) dependence     Diabetes ([address] )     Gout     HTN (hypertension)     Hyperlipidemia     Kidney stone     RIGHT    Morbid obesity with BMI of 40 0-44 9, adult ([address] )     SANCHO on CPAP        Past Surgical History:   Procedure Laterality Date    GANGLION CYST EXCISION Left 2/19/2019    Procedure: WRIST GANGLION CYST EXCISION;  Surgeon: Sidra Griffin MD;  Location: [facility];  Service: Orthopedics    UT CYSTO/URETERO W/LITHOTRIPSY &INDWELL STENT INSRT Right 5/1/2017    Procedure: CYSTOSCOPY URETEROSCOPY WITH LITHOTRIPSY HOLMIUM LASER, RETROGRADE PYELOGRAM AND INSERTION STENT URETERAL; Surgeon: Marisela Corona MD;  [facility]: [facility];  Service: Urology    ND FRAGMENT KIDNEY STONE/ ESWL Left 6/19/2017    Procedure: Luis Miguel Jernigan SHOCKWAVE (ESWL); Surgeon: Marisela Corona MD;  [facility];  Service: Urology    ND WRIST Yanely Perry LIG Left 2/19/2019    Procedure: ENDOSCOPIC CARPAL TUNNEL RELEASE;  Surgeon: Ollie Au MD;  Location: [facility];  Service: Orthopedics       Family History   Problem Relation Age of Onset    Bone cancer Father     Prostate cancer Maternal Grandfather     Cancer Family     Hypertension Family          Medications have been verified  Objective   /88   Pulse 80   Temp 97 5 °F (36 4 °C)   Resp 19   Ht 6' 2" (1 88 m)   Wt (!) 143 kg (316 lb)   SpO2 96%   BMI 40 57 kg/m²        Physical Exam     Physical Exam  Vitals and nursing note reviewed  Constitutional:       General: He is not in acute distress  Appearance: Normal appearance  He is well-developed  Cardiovascular:      Rate and Rhythm: Normal rate and regular rhythm  Heart sounds: Normal heart sounds, S1 normal and S2 normal    Pulmonary:      Effort: Pulmonary effort is normal       Breath sounds: Normal breath sounds  No wheezing, rhonchi or rales  Abdominal:      General: Bowel sounds are normal       Palpations: Abdomen is soft  Tenderness: There is no abdominal tenderness  There is no right CVA tenderness or left CVA tenderness  Musculoskeletal:      Thoracic back: No tenderness  Lumbar back: Spasms and tenderness present  No swelling or bony tenderness  Decreased range of motion  Negative right straight leg raise test and negative left straight leg raise test         Back:    Skin:     General: Skin is warm and dry  Capillary Refill: Capillary refill takes less than 2 seconds  Neurological:      General: No focal deficit present  Mental Status: He is alert  Sensory: Sensation is intact        Motor: Motor function is intact  Psychiatric:         Behavior: Behavior is cooperative

## 2022-09-27 NOTE — PATIENT INSTRUCTIONS
-You have been given a toradol shot today  This is an antiinflammatory medication, similar to ibuprofen  Do not take any other NSAIDs today (EX: ibuprofen, aleve, motrin)  You may take tylenol    -Take Prednisone as prescribed (Starting tomorrow  Take first thing in the morning with food ) Do not take Prednisone with other NSAIDs  -Take Robaxin as prescribed  Do not drive or operate heavy machinery after taking this medication as it will cause drowsiness  Do not drink alcohol while taking this medication    -Alternate ice and heat  -Avoid heavy lifting  -OTC lidoderm patch or capsacin cream    -Please call and schedule appointment with [facility]    -If you develop any new or worsening symptoms such as fever, vomiting, increased pain, lower extremity weakness, or loss of bowel/bladder control, proceed to ER for further evaluation  Acute Low Back Pain   AMBULATORY CARE:   Acute low back pain  is sudden discomfort that lasts up to 6 weeks and makes activity difficult  Common symptoms include the following:   Back stiffness or spasms    Pain down the back or side of one leg    Holding yourself in an unusual position or posture to decrease your back pain    Not being able to find a sitting position that is comfortable    Slow increase in your pain for 24 to 48 hours after you stress your back    Tenderness on your lower back or severe pain when you move your back    Seek care immediately if:   You have severe pain  You have sudden stiffness and heaviness on both buttocks down to both legs  You have numbness or weakness in one leg, or pain in both legs  You have numbness in your genital area or across your lower back  You cannot control your urine or bowel movements  Call your doctor if:   You have a fever  You have pain at night or when you rest     Your pain does not get better with treatment  You have pain that worsens when you cough or sneeze      You suddenly feel something pop or snap in your back  You have questions or concerns about your condition or care  The goal of treatment for acute low back pain  is to relieve your pain and help you be able to do your regular activities  Most people with acute lower back pain get better within 4 to 6 weeks  You may need any of the following:  NSAIDs , such as ibuprofen, help decrease swelling, pain, and fever  This medicine is available with or without a doctor's order  NSAIDs can cause stomach bleeding or kidney problems in certain people  If you take blood thinner medicine, always ask your healthcare provider if NSAIDs are safe for you  Always read the medicine label and follow directions  Acetaminophen  decreases pain and fever  It is available without a doctor's order  Ask how much to take and how often to take it  Follow directions  Read the labels of all other medicines you are using to see if they also contain acetaminophen, or ask your doctor or pharmacist  Acetaminophen can cause liver damage if not taken correctly  Do not use more than 4 grams (4,000 milligrams) total of acetaminophen in one day  Muscle relaxers  decrease pain by relaxing the muscles in your lower spine  Prescription pain medicine  may be given  Ask your healthcare provider how to take this medicine safely  Some prescription pain medicines contain acetaminophen  Do not take other medicines that contain acetaminophen without talking to your healthcare provider  Too much acetaminophen may cause liver damage  Prescription pain medicine may cause constipation  Ask your healthcare provider how to prevent or treat constipation  Manage your symptoms:   Stay active  as much as you can without causing more pain  Bed rest could make your back pain worse  Start with some light exercises such as walking  Avoid heavy lifting until your pain is gone  Ask for more information about the activities or exercises that are right for you           Apply heat on your back for 20 to 30 minutes every 2 hours for as many days as directed  Heat helps decrease pain and muscle spasms  Alternate heat and ice  Apply ice  on your back for 15 to 20 minutes every hour or as directed  Use an ice pack, or put crushed ice in a plastic bag  Cover it with a towel before you apply it to your skin  Ice helps prevent tissue damage and decreases swelling and pain  Prevent acute low back pain:   Use proper body mechanics  Bend at the hips and knees when you  objects  Do not bend from the waist  Use your leg muscles as you lift the load  Do not use your back  Keep the object close to your chest as you lift it  Try not to twist or lift anything above your waist          Change your position often when you stand for long periods of time  Rest one foot on a small box or footrest, and then switch to the other foot often  Try not to sit for long periods of time  When you do, sit in a straight-backed chair with your feet flat on the floor  Never reach, pull, or push while you are sitting  Do exercises that strengthen your back muscles  Warm up before you exercise  Ask your healthcare provider the best exercises for you  Maintain a healthy weight  Ask your healthcare provider what a healthy weight is for you  Ask him or her to help you create a weight loss plan if you are overweight  Follow up with your doctor as directed:  Return for a follow-up visit if you still have pain after 1 to 3 weeks of treatment  You may need to visit an orthopedist if your back pain lasts longer than 12 weeks  Write down your questions so you remember to ask them during your visits  © [employer] 2022 Information is for End User's use only and may not be sold, redistributed or otherwise used for commercial purposes   All illustrations and images included in CareNotes® are the copyrighted property of [employer]  or Cece Avendano  The above information is an  only  It is not intended as medical advice for individual conditions or treatments  Talk to your doctor, nurse or pharmacist before following any medical regimen to see if it is safe and effective for you

## 2022-09-29 ENCOUNTER — TELEPHONE (OUTPATIENT)
Dept: SLEEP CENTER | Facility: CLINIC | Age: 47
End: 2022-09-29

## 2022-09-29 NOTE — TELEPHONE ENCOUNTER
Called patient and advised sleep study resulted  Per results, optimal CPAP pressure is unchanged at 12cm  Patient made aware to keep follow up appointment on 11/8/22

## 2022-10-04 ENCOUNTER — OFFICE VISIT (OUTPATIENT)
Dept: FAMILY MEDICINE CLINIC | Facility: CLINIC | Age: 47
End: 2022-10-04
Payer: COMMERCIAL

## 2022-10-04 VITALS
SYSTOLIC BLOOD PRESSURE: 158 MMHG | BODY MASS INDEX: 40.43 KG/M2 | DIASTOLIC BLOOD PRESSURE: 91 MMHG | HEIGHT: 74 IN | OXYGEN SATURATION: 97 % | TEMPERATURE: 96 F | WEIGHT: 315 LBS | HEART RATE: 64 BPM

## 2022-10-04 DIAGNOSIS — E11.69 HYPERLIPIDEMIA ASSOCIATED WITH TYPE 2 DIABETES MELLITUS (HCC): ICD-10-CM

## 2022-10-04 DIAGNOSIS — M54.50 ACUTE LEFT-SIDED LOW BACK PAIN WITHOUT SCIATICA: ICD-10-CM

## 2022-10-04 DIAGNOSIS — E11.59 HYPERTENSION COMPLICATING DIABETES (HCC): ICD-10-CM

## 2022-10-04 DIAGNOSIS — I15.2 HYPERTENSION COMPLICATING DIABETES (HCC): ICD-10-CM

## 2022-10-04 DIAGNOSIS — E11.9 TYPE 2 DIABETES MELLITUS WITHOUT COMPLICATION, WITHOUT LONG-TERM CURRENT USE OF INSULIN (HCC): Primary | ICD-10-CM

## 2022-10-04 DIAGNOSIS — E78.5 HYPERLIPIDEMIA ASSOCIATED WITH TYPE 2 DIABETES MELLITUS (HCC): ICD-10-CM

## 2022-10-04 DIAGNOSIS — Z12.11 SCREENING FOR COLON CANCER: ICD-10-CM

## 2022-10-04 LAB — SL AMB POCT HEMOGLOBIN AIC: 7.9 (ref ?–6.5)

## 2022-10-04 PROCEDURE — 99214 OFFICE O/P EST MOD 30 MIN: CPT | Performed by: PHYSICIAN ASSISTANT

## 2022-10-04 PROCEDURE — 83036 HEMOGLOBIN GLYCOSYLATED A1C: CPT | Performed by: PHYSICIAN ASSISTANT

## 2022-10-04 RX ORDER — NAPROXEN 500 MG/1
500 TABLET ORAL 2 TIMES DAILY WITH MEALS
Qty: 30 TABLET | Refills: 0 | Status: SHIPPED | OUTPATIENT
Start: 2022-10-04

## 2022-10-04 RX ORDER — METHOCARBAMOL 750 MG/1
750 TABLET, FILM COATED ORAL EVERY 6 HOURS PRN
Qty: 30 TABLET | Refills: 0 | Status: SHIPPED | OUTPATIENT
Start: 2022-10-04

## 2022-10-04 NOTE — PROGRESS NOTES
Name: Oswaldo Dobson      : 1975      MRN: [id number]  Encounter Provider: Kathleen Guidry PA-C  Encounter Date: 10/4/2022   Encounter department: [address] [place] [address]     1  Type 2 diabetes mellitus without complication, without long-term current use of insulin (HCC)  -     POCT hemoglobin A1c    2  Screening for colon cancer  -     Ambulatory referral for colonoscopy; Future    3  Acute left-sided low back pain without sciatica  -     naproxen (Naprosyn) 500 mg tablet; Take 1 tablet (500 mg total) by mouth 2 (two) times a day with meals  -     methocarbamol (Robaxin-750) 750 mg tablet; Take 1 tablet (750 mg total) by mouth every 6 (six) hours as needed for muscle spasms    4  Hypertension complicating diabetes ([address] )    5  Hyperlipidemia associated with type 2 diabetes mellitus (HCC)  start naproxen, increase robaxin dosing  Supportive measures, after his fishing trip would recommend PT  DM not well controlled, discussed increasing trulicity but pt just received a 3 month supply of 1 5mg dosing  Will be more compliant with metformin and diet/exercise, repeat a1c in 3 months  Check labs  BP above goal today, traditionally more controlled, likely 2/2 nsaids, steroids, pain  Monitor  Subjective     Pt presents for acute on chronic low back pain  R sided, can radiate to the front of the thigh  No injuries  Worse with laying flat and standing too long  Was seen in UC, given toradol, prednisone, robaxin, minimal improvement  Shares he is going on a fishing trip and is worried about his back  DM: a1c increased to 7 9 from 7 5, not toally compliant with metformin, also on jardiance, trulicity  HLD: due for labs, on high dose statin  HTN: elevated today, traditionally more controlled, on hyzaar, no end organ complaints      Review of Systems   Constitutional: Negative for chills, fatigue and fever     HENT: Negative for congestion, ear pain, hearing loss, nosebleeds, postnasal drip, rhinorrhea, sinus pressure, sinus pain, sneezing and sore throat  Eyes: Negative for pain, discharge, itching and visual disturbance  Respiratory: Negative for cough, chest tightness, shortness of breath and wheezing  Cardiovascular: Negative for chest pain, palpitations and leg swelling  Gastrointestinal: Negative for abdominal pain, blood in stool, constipation, diarrhea, nausea and vomiting  Genitourinary: Negative for frequency and urgency  Musculoskeletal: Positive for back pain  Neurological: Negative for dizziness, light-headedness and numbness  Past Medical History:   Diagnosis Date    CPAP (continuous positive airway pressure) dependence     Diabetes ([address] )     Gout     HTN (hypertension)     Hyperlipidemia     Kidney stone     RIGHT    Morbid obesity with BMI of 40 0-44 9, adult (HCC)     SANCHO on CPAP      Past Surgical History:   Procedure Laterality Date    GANGLION CYST EXCISION Left 2/19/2019    Procedure: WRIST GANGLION CYST EXCISION;  Surgeon: Ollie Au MD;  [facility];  Service: Orthopedics    [place] CYSTO/URETERO W/LITHOTRIPSY &INDWELL STENT INSRT Right 5/1/2017    Procedure: CYSTOSCOPY URETEROSCOPY WITH LITHOTRIPSY HOLMIUM LASER, RETROGRADE PYELOGRAM AND INSERTION STENT URETERAL;  Surgeon: Marisela Corona MD;  [facility];  Service: Urology    Tony ESWL Left 6/19/2017    Procedure: Luis Miguel Jernigan SHOCKWAVE (ESWL);   Surgeon: Marisela Corona MD;  [facility];  Service: Urology    SC WRIST Yanely Perry LIG Left 2/19/2019    Procedure: ENDOSCOPIC CARPAL TUNNEL RELEASE;  Surgeon: Ollie Au MD;  [facility];  Service: Orthopedics     Family History   Problem Relation Age of Onset    Bone cancer Father     Prostate cancer Maternal Grandfather     Cancer Family     Hypertension Family      Social History     Socioeconomic History    Marital status: /Civil Union Spouse name: None    Number of children: None    Years of education: None    Highest education level: None   Occupational History    None   Tobacco Use    Smoking status: Former Smoker     Types: Cigarettes     Quit date:      Years since quittin 7    Smokeless tobacco: Former User     Types: Chew    Tobacco comment: Former Smoker as per allscripts   Substance and Sexual Activity    Alcohol use: Yes     Comment: socially    Drug use: No    Sexual activity: None   Other Topics Concern    None   Social History Narrative    Daily Coffee Consumption    Daily Tea Consumption     Social Determinants of Health     Financial Resource Strain: Not on file   Food Insecurity: Not on file   Transportation Needs: Not on file   Physical Activity: Not on file   Stress: Not on file   Social Connections: Not on file   Intimate Partner Violence: Not on file   Housing Stability: Not on file     Current Outpatient Medications on File Prior to Visit   Medication Sig    allopurinol (ZYLOPRIM) 300 mg tablet Take 1 tablet (300 mg total) by mouth daily    amLODIPine (NORVASC) 5 mg tablet Take 1 tablet (5 mg total) by mouth daily    atorvastatin (LIPITOR) 80 mg tablet Take 1 tablet (80 mg total) by mouth daily    Blood Glucose Monitoring Suppl (ONE TOUCH ULTRA 2) w/Device KIT 1 kit by Does not apply route daily    Choline Fenofibrate (Fenofibric Acid) 135 MG CPDR Take 1 capsule (135 mg total) by mouth daily    Empagliflozin (Jardiance) 10 MG TABS Take 1 tablet (10 mg total) by mouth every morning    glucose blood test strip Use 1 each daily    ibuprofen (MOTRIN) 600 mg tablet TAKE 1 TABLET EVERY 8 HOURS    Icosapent Ethyl 1 g CAPS TAKE 1 CAPSULE TWICE A DAY (Patient not taking: Reported on 2022)    indomethacin (INDOCIN) 50 mg capsule Take 1 capsule (50 mg total) by mouth 2 (two) times a day as needed for mild pain    losartan-hydrochlorothiazide (HYZAAR) 100-25 MG per tablet Take 1 tablet by mouth daily  metFORMIN (GLUCOPHAGE-XR) 500 mg 24 hr tablet Take 2 tablets (1,000 mg total) by mouth 2 (two) times a day with meals    ONETOUCH DELICA LANCETS 11V MISC 1 Units by Does not apply route daily    sildenafil (VIAGRA) 100 mg tablet Take 1 tablet (100 mg total) by mouth daily as needed for erectile dysfunction    tamsulosin (FLOMAX) 0 4 mg Take 1 capsule (0 4 mg total) by mouth daily with dinner    [DISCONTINUED] methocarbamol (ROBAXIN) 500 mg tablet Take 1 tablet (500 mg total) by mouth 3 (three) times a day    [DISCONTINUED] Trulicity 1 5 ZP/4 3NC SOPN Inject 0 5 mL (1 5 mg total) under the skin once a week     No Known Allergies  Immunization History   Administered Date(s) Administered    COVID-19 PFIZER VACCINE 0 3 ML IM 04/05/2021, 04/26/2021    INFLUENZA 09/14/2021    Influenza Injectable, MDCK, Preservative Free, Quadrivalent, 0 5 mL 10/24/2018, 10/16/2019    Influenza, recombinant, quadrivalent,injectable, preservative free 10/06/2020       Objective     /91   Pulse 64   Temp (!) 96 °F (35 6 °C)   Ht 6' 2" (1 88 m)   Wt (!) 144 kg (318 lb)   SpO2 97%   BMI 40 83 kg/m²     Physical Exam  Vitals and nursing note reviewed  Constitutional:       General: He is not in acute distress  Appearance: Normal appearance  HENT:      Head: Normocephalic and atraumatic  Nose: Nose normal    Eyes:      Pupils: Pupils are equal, round, and reactive to light  Cardiovascular:      Rate and Rhythm: Normal rate and regular rhythm  Heart sounds: Normal heart sounds  No murmur heard  Pulmonary:      Effort: Pulmonary effort is normal  No respiratory distress  Breath sounds: Normal breath sounds  No wheezing, rhonchi or rales  Musculoskeletal:      Cervical back: Normal range of motion and neck supple  Lumbar back: Tenderness present  No bony tenderness  Decreased range of motion   Negative right straight leg raise test and negative left straight leg raise test    Skin: General: Skin is warm and dry  Neurological:      Mental Status: He is alert and oriented to person, place, and time  Sensory: No sensory deficit  Motor: No weakness     Psychiatric:         Mood and Affect: Mood and affect normal        Geovanna Ceron PA-C

## 2022-10-05 ENCOUNTER — PREP FOR PROCEDURE (OUTPATIENT)
Dept: GASTROENTEROLOGY | Facility: CLINIC | Age: 47
End: 2022-10-05

## 2022-10-05 DIAGNOSIS — Z12.11 SCREENING FOR COLON CANCER: Primary | ICD-10-CM

## 2022-12-06 ENCOUNTER — ANESTHESIA (OUTPATIENT)
Dept: GASTROENTEROLOGY | Facility: HOSPITAL | Age: 47
End: 2022-12-06

## 2022-12-06 ENCOUNTER — ANESTHESIA EVENT (OUTPATIENT)
Dept: GASTROENTEROLOGY | Facility: HOSPITAL | Age: 47
End: 2022-12-06

## 2022-12-06 ENCOUNTER — HOSPITAL ENCOUNTER (OUTPATIENT)
Dept: GASTROENTEROLOGY | Facility: HOSPITAL | Age: 47
Setting detail: OUTPATIENT SURGERY
Discharge: HOME | End: 2022-12-06
Attending: INTERNAL MEDICINE

## 2022-12-06 VITALS
BODY MASS INDEX: 39.5 KG/M2 | WEIGHT: 307.76 LBS | HEIGHT: 74 IN | RESPIRATION RATE: 16 BRPM | TEMPERATURE: 97.5 F | HEART RATE: 71 BPM | SYSTOLIC BLOOD PRESSURE: 113 MMHG | OXYGEN SATURATION: 98 % | DIASTOLIC BLOOD PRESSURE: 59 MMHG

## 2022-12-06 DIAGNOSIS — Z12.11 SCREENING FOR COLON CANCER: ICD-10-CM

## 2022-12-06 LAB — GLUCOSE SERPL-MCNC: 138 MG/DL (ref 65–140)

## 2022-12-06 RX ORDER — PROPOFOL 10 MG/ML
INJECTION, EMULSION INTRAVENOUS AS NEEDED
Status: DISCONTINUED | OUTPATIENT
Start: 2022-12-06 | End: 2022-12-06

## 2022-12-06 RX ADMIN — PROPOFOL 100 MG: 10 INJECTION, EMULSION INTRAVENOUS at 13:38

## 2022-12-06 RX ADMIN — PROPOFOL 200 MG: 10 INJECTION, EMULSION INTRAVENOUS at 13:35

## 2022-12-06 NOTE — H&P
History and Physical - [facility]  Janak Carrington 50 y o  male MRN: [id number]                  HPI: Janak Carrington is a 50y o  year old male who presents for colonoscopy for colon cancer screening      REVIEW OF SYSTEMS: Per the HPI, and otherwise unremarkable  Historical Information   Past Medical History:   Diagnosis Date   • CPAP (continuous positive airway pressure) dependence    • Diabetes ([facility] )    • Diabetes mellitus ([facility] )    • Gout    • HTN (hypertension)    • Hyperlipidemia    • Kidney stone     RIGHT   • Morbid obesity with BMI of 40 0-44 9, adult ([facility] )    • SANCHO on CPAP      Past Surgical History:   Procedure Laterality Date   • GANGLION CYST EXCISION Left 2019    Procedure: WRIST GANGLION CYST EXCISION;  Surgeon: Abraham Mitchell MD;  [facility];  Service: Orthopedics   • [place] CYSTO/URETERO W/LITHOTRIPSY &INDWELL STENT INSRT Right 2017    Procedure: CYSTOSCOPY URETEROSCOPY WITH LITHOTRIPSY HOLMIUM LASER, RETROGRADE PYELOGRAM AND INSERTION STENT URETERAL;  Surgeon: Alyson Madden MD;  [facility];  Service: Urology   • AR FRAGMENT KIDNEY STONE/ ESWL Left 2017    Procedure: Clayton Escobedo SHOCKWAVE (ESWL);   Surgeon: Alyson Madden MD;  [facility];  Service: Urology   • [place] WRIST Soni Bishop LIG Left 2019    Procedure: ENDOSCOPIC CARPAL TUNNEL RELEASE;  Surgeon: Abraham Mitchell MD;  [facility];  Service: Orthopedics     Social History   Social History     Substance and Sexual Activity   Alcohol Use Yes   • Alcohol/week: 1 0 standard drink   • Types: 1 Cans of beer per week    Comment: socially     Social History     Substance and Sexual Activity   Drug Use No     Social History     Tobacco Use   Smoking Status Former   • Types: Cigarettes   • Quit date:    • Years since quittin 9   Smokeless Tobacco Former   • Types: Chew   Tobacco Comments    Former Smoker as per allscripts     Family History   Problem Relation Age of Onset   • Bone cancer Father    • Prostate cancer Maternal Grandfather    • Cancer Family    • Hypertension Family        Meds/Allergies     (Not in a hospital admission)      No Known Allergies    Objective     Blood pressure 123/63, pulse 72, temperature 97 6 °F (36 4 °C), temperature source Temporal, resp  rate 16, height 6' 2" (1 88 m), weight (!) 140 kg (307 lb 12 2 oz), SpO2 95 %  PHYSICAL EXAM    /63   Pulse 72   Temp 97 6 °F (36 4 °C) (Temporal)   Resp 16   Ht 6' 2" (1 88 m)   Wt (!) 140 kg (307 lb 12 2 oz)   SpO2 95%   BMI 39 51 kg/m²       Gen: NAD  CV: RRR  CHEST: Clear  ABD: soft, NT/ND  EXT: no edema      ASSESSMENT/PLAN:  This is a 50y o  year old male here for Colonoscopy, and he is stable and optimized for his procedure

## 2022-12-06 NOTE — ANESTHESIA POSTPROCEDURE EVALUATION
Post-Op Assessment Note    CV Status:  Stable  Pain Score: 0    Pain management: adequate     Mental Status:  Alert and awake   Hydration Status:  Euvolemic   PONV Controlled:  Controlled   Airway Patency:  Patent      Post Op Vitals Reviewed: Yes      Staff: CRNA         No notable events documented      /58 (12/06/22 1351)    Temp 97 5 °F (36 4 °C) (12/06/22 1351)    Pulse 71 (12/06/22 1351)   Resp 15 (12/06/22 1351)    SpO2 95 % (12/06/22 1351)

## 2022-12-06 NOTE — ANESTHESIA PREPROCEDURE EVALUATION
Procedure:  COLONOSCOPY    Relevant Problems   CARDIO   (+) Hyperlipidemia associated with type 2 diabetes mellitus (HCC)   (+) Hypertension complicating diabetes ([address] )      ENDO   (+) Type 2 diabetes mellitus without complication, without long-term current use of insulin (HCC)      GI/HEPATIC   (+) Fatty liver      MUSCULOSKELETAL   (+) Gout      PULMONARY   (+) SANCHO (obstructive sleep apnea)      Other   (+) Morbid obesity with BMI of 40 0-44 9, adult (HCC)    uses CPAP     Physical Exam    Airway    Mallampati score: II  TM Distance: >3 FB  Neck ROM: full     Dental   Comment: Denies loose teeth,     Cardiovascular  Cardiovascular exam normal    Pulmonary  Pulmonary exam normal     Other Findings  Portions of exam deferred due to low yield and/or unknown COVID status      Anesthesia Plan  ASA Score- 3     Anesthesia Type- IV sedation with anesthesia with ASA Monitors  Additional Monitors:   Airway Plan:           Plan Factors-Exercise tolerance (METS): >4 METS  Chart reviewed  Existing labs reviewed  Patient summary reviewed  Patient is not a current smoker  Induction- intravenous  Postoperative Plan-     Informed Consent- Anesthetic plan and risks discussed with patient  I personally reviewed this patient with the CRNA  Discussed and agreed on the Anesthesia Plan with the CRNA  Teagan Amos

## 2023-01-10 ENCOUNTER — OFFICE VISIT (OUTPATIENT)
Dept: FAMILY MEDICINE CLINIC | Facility: CLINIC | Age: 48
End: 2023-01-10

## 2023-01-10 ENCOUNTER — APPOINTMENT (OUTPATIENT)
Dept: LAB | Facility: CLINIC | Age: 48
End: 2023-01-10

## 2023-01-10 VITALS
OXYGEN SATURATION: 96 % | HEART RATE: 76 BPM | HEIGHT: 74 IN | SYSTOLIC BLOOD PRESSURE: 124 MMHG | BODY MASS INDEX: 40.43 KG/M2 | DIASTOLIC BLOOD PRESSURE: 80 MMHG | WEIGHT: 315 LBS | TEMPERATURE: 96.1 F

## 2023-01-10 DIAGNOSIS — I10 ESSENTIAL HYPERTENSION: ICD-10-CM

## 2023-01-10 DIAGNOSIS — E11.59 HYPERTENSION COMPLICATING DIABETES (HCC): Primary | ICD-10-CM

## 2023-01-10 DIAGNOSIS — E78.2 MIXED HYPERLIPIDEMIA: ICD-10-CM

## 2023-01-10 DIAGNOSIS — N13.8 BPH WITH OBSTRUCTION/LOWER URINARY TRACT SYMPTOMS: ICD-10-CM

## 2023-01-10 DIAGNOSIS — E66.9 TYPE 2 DIABETES MELLITUS WITH OBESITY (HCC): ICD-10-CM

## 2023-01-10 DIAGNOSIS — M54.50 ACUTE LEFT-SIDED LOW BACK PAIN WITHOUT SCIATICA: ICD-10-CM

## 2023-01-10 DIAGNOSIS — E11.69 HYPERLIPIDEMIA ASSOCIATED WITH TYPE 2 DIABETES MELLITUS (HCC): ICD-10-CM

## 2023-01-10 DIAGNOSIS — E11.9 TYPE 2 DIABETES MELLITUS WITHOUT COMPLICATION, WITHOUT LONG-TERM CURRENT USE OF INSULIN (HCC): ICD-10-CM

## 2023-01-10 DIAGNOSIS — E78.1 HIGH TRIGLYCERIDES: ICD-10-CM

## 2023-01-10 DIAGNOSIS — E11.69 TYPE 2 DIABETES MELLITUS WITH OBESITY (HCC): ICD-10-CM

## 2023-01-10 DIAGNOSIS — I15.2 HYPERTENSION COMPLICATING DIABETES (HCC): Primary | ICD-10-CM

## 2023-01-10 DIAGNOSIS — E11.59 HYPERTENSION COMPLICATING DIABETES (HCC): ICD-10-CM

## 2023-01-10 DIAGNOSIS — E66.01 MORBID OBESITY WITH BMI OF 40.0-44.9, ADULT (HCC): ICD-10-CM

## 2023-01-10 DIAGNOSIS — E78.5 HYPERLIPIDEMIA ASSOCIATED WITH TYPE 2 DIABETES MELLITUS (HCC): ICD-10-CM

## 2023-01-10 DIAGNOSIS — N40.1 BPH WITH OBSTRUCTION/LOWER URINARY TRACT SYMPTOMS: ICD-10-CM

## 2023-01-10 DIAGNOSIS — I15.2 HYPERTENSION COMPLICATING DIABETES (HCC): ICD-10-CM

## 2023-01-10 LAB
ALBUMIN SERPL BCP-MCNC: 4.3 G/DL (ref 3.5–5)
ALP SERPL-CCNC: 67 U/L (ref 46–116)
ALT SERPL W P-5'-P-CCNC: 46 U/L (ref 12–78)
ANION GAP SERPL CALCULATED.3IONS-SCNC: 9 MMOL/L (ref 4–13)
AST SERPL W P-5'-P-CCNC: 17 U/L (ref 5–45)
BASOPHILS # BLD AUTO: 0.04 THOUSANDS/ÂΜL (ref 0–0.1)
BASOPHILS NFR BLD AUTO: 1 % (ref 0–1)
BILIRUB SERPL-MCNC: 0.76 MG/DL (ref 0.2–1)
BUN SERPL-MCNC: 20 MG/DL (ref 5–25)
CALCIUM SERPL-MCNC: 9.7 MG/DL (ref 8.3–10.1)
CHLORIDE SERPL-SCNC: 109 MMOL/L (ref 96–108)
CHOLEST SERPL-MCNC: 156 MG/DL
CO2 SERPL-SCNC: 23 MMOL/L (ref 21–32)
CREAT SERPL-MCNC: 0.88 MG/DL (ref 0.6–1.3)
CREAT UR-MCNC: 51.4 MG/DL
EOSINOPHIL # BLD AUTO: 0.13 THOUSAND/ÂΜL (ref 0–0.61)
EOSINOPHIL NFR BLD AUTO: 3 % (ref 0–6)
ERYTHROCYTE [DISTWIDTH] IN BLOOD BY AUTOMATED COUNT: 14.4 % (ref 11.6–15.1)
GFR SERPL CREATININE-BSD FRML MDRD: 100 ML/MIN/1.73SQ M
GLUCOSE P FAST SERPL-MCNC: 182 MG/DL (ref 65–99)
HCT VFR BLD AUTO: 46.4 % (ref 36.5–49.3)
HDLC SERPL-MCNC: 27 MG/DL
HGB BLD-MCNC: 14.8 G/DL (ref 12–17)
IMM GRANULOCYTES # BLD AUTO: 0.02 THOUSAND/UL (ref 0–0.2)
IMM GRANULOCYTES NFR BLD AUTO: 0 % (ref 0–2)
LDLC SERPL DIRECT ASSAY-MCNC: 66 MG/DL (ref 0–100)
LYMPHOCYTES # BLD AUTO: 1.56 THOUSANDS/ÂΜL (ref 0.6–4.47)
LYMPHOCYTES NFR BLD AUTO: 31 % (ref 14–44)
MCH RBC QN AUTO: 25.8 PG (ref 26.8–34.3)
MCHC RBC AUTO-ENTMCNC: 31.9 G/DL (ref 31.4–37.4)
MCV RBC AUTO: 81 FL (ref 82–98)
MICROALBUMIN UR-MCNC: 21.1 MG/L (ref 0–20)
MICROALBUMIN/CREAT 24H UR: 41 MG/G CREATININE (ref 0–30)
MONOCYTES # BLD AUTO: 0.38 THOUSAND/ÂΜL (ref 0.17–1.22)
MONOCYTES NFR BLD AUTO: 8 % (ref 4–12)
NEUTROPHILS # BLD AUTO: 2.91 THOUSANDS/ÂΜL (ref 1.85–7.62)
NEUTS SEG NFR BLD AUTO: 57 % (ref 43–75)
NRBC BLD AUTO-RTO: 0 /100 WBCS
PLATELET # BLD AUTO: 178 THOUSANDS/UL (ref 149–390)
PMV BLD AUTO: 10.8 FL (ref 8.9–12.7)
POTASSIUM SERPL-SCNC: 3.9 MMOL/L (ref 3.5–5.3)
PROT SERPL-MCNC: 7.8 G/DL (ref 6.4–8.4)
PSA SERPL-MCNC: 0.4 NG/ML (ref 0–4)
RBC # BLD AUTO: 5.73 MILLION/UL (ref 3.88–5.62)
SL AMB POCT HEMOGLOBIN AIC: 7.6 (ref ?–6.5)
SODIUM SERPL-SCNC: 141 MMOL/L (ref 135–147)
TRIGL SERPL-MCNC: 561 MG/DL
TSH SERPL DL<=0.05 MIU/L-ACNC: 0.6 UIU/ML (ref 0.45–4.5)
WBC # BLD AUTO: 5.04 THOUSAND/UL (ref 4.31–10.16)

## 2023-01-10 RX ORDER — DULAGLUTIDE 1.5 MG/.5ML
1.5 INJECTION, SOLUTION SUBCUTANEOUS WEEKLY
Qty: 6 ML | Refills: 3 | Status: SHIPPED | OUTPATIENT
Start: 2023-01-10

## 2023-01-10 RX ORDER — METFORMIN HYDROCHLORIDE 500 MG/1
1000 TABLET, EXTENDED RELEASE ORAL 2 TIMES DAILY WITH MEALS
Qty: 360 TABLET | Refills: 3 | Status: SHIPPED | OUTPATIENT
Start: 2023-01-10

## 2023-01-10 RX ORDER — METHOCARBAMOL 750 MG/1
750 TABLET, FILM COATED ORAL EVERY 6 HOURS PRN
Qty: 30 TABLET | Refills: 0 | Status: SHIPPED | OUTPATIENT
Start: 2023-01-10

## 2023-01-10 RX ORDER — AMLODIPINE BESYLATE 5 MG/1
5 TABLET ORAL DAILY
Qty: 90 TABLET | Refills: 3 | Status: SHIPPED | OUTPATIENT
Start: 2023-01-10

## 2023-01-10 RX ORDER — LOSARTAN POTASSIUM AND HYDROCHLOROTHIAZIDE 25; 100 MG/1; MG/1
1 TABLET ORAL DAILY
Qty: 90 TABLET | Refills: 3 | Status: SHIPPED | OUTPATIENT
Start: 2023-01-10

## 2023-01-10 RX ORDER — ATORVASTATIN CALCIUM 80 MG/1
80 TABLET, FILM COATED ORAL DAILY
Qty: 90 TABLET | Refills: 3 | Status: SHIPPED | OUTPATIENT
Start: 2023-01-10

## 2023-01-10 NOTE — PROGRESS NOTES
Name: Lindsey Mcgee      : 1975      MRN: [id number]  Encounter Provider: Bruno Zapata PA-C  Encounter Date: 1/10/2023   Encounter department: [address] [place] [address]     1  Hypertension complicating diabetes ([place] [address] )  -     Comprehensive metabolic panel; Future; Expected date: 01/10/2023  -     CBC and differential; Future; Expected date: 01/10/2023    2  Type 2 diabetes mellitus without complication, without long-term current use of insulin (HCC)  -     Comprehensive metabolic panel; Future; Expected date: 01/10/2023  -     CBC and differential; Future; Expected date: 01/10/2023  -     Microalbumin / creatinine urine ratio; Future; Expected date: 01/10/2023  -     POCT hemoglobin W0D  -     Trulicity 1 5 OX/7 6NO injection; Inject 0 5 mL (1 5 mg total) under the skin once a week  -     metFORMIN (GLUCOPHAGE-XR) 500 mg 24 hr tablet; Take 2 tablets (1,000 mg total) by mouth 2 (two) times a day with meals  -     Empagliflozin (Jardiance) 10 MG TABS tablet; Take 1 tablet (10 mg total) by mouth every morning    3  Hyperlipidemia associated with type 2 diabetes mellitus ([place] [address] )  -     Lipid Panel with Direct LDL reflex; Future; Expected date: 01/10/2023    4  Type 2 diabetes mellitus with obesity (HCC)  -     Comprehensive metabolic panel; Future; Expected date: 01/10/2023  -     Microalbumin / creatinine urine ratio; Future; Expected date: 01/10/2023    5  Essential hypertension  -     amLODIPine (NORVASC) 5 mg tablet; Take 1 tablet (5 mg total) by mouth daily  -     losartan-hydrochlorothiazide (HYZAAR) 100-25 MG per tablet; Take 1 tablet by mouth daily    6  Mixed hyperlipidemia  -     atorvastatin (LIPITOR) 80 mg tablet; Take 1 tablet (80 mg total) by mouth daily    7  Acute left-sided low back pain without sciatica  -     methocarbamol (Robaxin-750) 750 mg tablet;  Take 1 tablet (750 mg total) by mouth every 6 (six) hours as needed for muscle spasms  DM improved, still slightly above goal, increase trulicity to 0 8OE weekly  Continue metformin, jardiance  BP at goal  Check lipids, continue statin  3 month follow up, earlier prn       Subjective     Pt presents for routine follow up    DM: a1c increased to 7 6 from 7 9, now compliant with metformin, also on jardiance, trulicity  HLD: due for labs, on high dose statin  HTN: 124/80 on recheck, traditionally more controlled, on hyzaar, no end organ complaints    Completed CRC screening in the interim, no other health changes    Overall feels well    Review of Systems   Constitutional: Negative for chills, fatigue and fever  HENT: Negative for congestion, ear pain, hearing loss, nosebleeds, postnasal drip, rhinorrhea, sinus pressure, sinus pain, sneezing and sore throat  Eyes: Negative for pain, discharge, itching and visual disturbance  Respiratory: Negative for cough, chest tightness, shortness of breath and wheezing  Cardiovascular: Negative for chest pain, palpitations and leg swelling  Gastrointestinal: Negative for abdominal pain, blood in stool, constipation, diarrhea, nausea and vomiting  Genitourinary: Negative for frequency and urgency  Neurological: Negative for dizziness, light-headedness and numbness         Past Medical History:   Diagnosis Date   • CPAP (continuous positive airway pressure) dependence    • Diabetes ([address] )    • Diabetes mellitus ([address] )    • Gout    • HTN (hypertension)    • Hyperlipidemia    • Kidney stone     RIGHT   • Morbid obesity with BMI of 40 0-44 9, adult ([address] )    • SANCHO on CPAP      Past Surgical History:   Procedure Laterality Date   • GANGLION CYST EXCISION Left 2/19/2019    Procedure: WRIST GANGLION CYST EXCISION;  Surgeon: Bahman Kendall MD;  Location: [facility];  Service: Orthopedics   • [place] CYSTO/URETERO W/LITHOTRIPSY &INDWELL STENT INSRT Right 5/1/2017    Procedure: CYSTOSCOPY URETEROSCOPY WITH LITHOTRIPSY HOLMIUM LASER, RETROGRADE PYELOGRAM AND INSERTION STENT URETERAL;  Surgeon: Dianne Avelar Allison Kincaid MD;  [facility];  Service: Urology   • [place] LITHOTRIPSY XTRCORP SHOCK WAVE Left 2017    Procedure: Lauren Mendez SHOCKWAVE (ESWL); Surgeon: Maiclo Curran MD;  [facility];  Service: Urology   • [place] [address] WRST SURG W/RLS TRANSVRS CARPL LIGM Left 2019    Procedure: ENDOSCOPIC CARPAL TUNNEL RELEASE;  Surgeon: Alessia Quijano MD;  [facility];  Service: Orthopedics     Family History   Problem Relation Age of Onset   • Bone cancer Father    • Prostate cancer Maternal Grandfather    • Cancer Family    • Hypertension Family      Social History     Socioeconomic History   • Marital status: /Civil Union     Spouse name: None   • Number of children: None   • Years of education: None   • Highest education level: None   Occupational History   • None   Tobacco Use   • Smoking status: Former     Types: Cigarettes     Quit date:      Years since quittin 0   • Smokeless tobacco: Former     Types: Chew   • Tobacco comments:     Former Smoker as per allscripts   Substance and Sexual Activity   • Alcohol use:  Yes     Alcohol/week: 1 0 standard drink     Types: 1 Cans of beer per week     Comment: socially   • Drug use: No   • Sexual activity: None   Other Topics Concern   • None   Social History Narrative    Daily Coffee Consumption    Daily Tea Consumption     Social Determinants of Health     Financial Resource Strain: Not on file   Food Insecurity: Not on file   Transportation Needs: Not on file   Physical Activity: Not on file   Stress: Not on file   Social Connections: Not on file   Intimate Partner Violence: Not on file   Housing Stability: Not on file     Current Outpatient Medications on File Prior to Visit   Medication Sig   • allopurinol (ZYLOPRIM) 300 mg tablet Take 1 tablet (300 mg total) by mouth daily   • Blood Glucose Monitoring Suppl (ONE TOUCH ULTRA 2) w/Device KIT 1 kit by Does not apply route daily   • Choline Fenofibrate (Fenofibric Acid) 135 MG CPDR Take 1 capsule (135 mg total) by mouth daily   • glucose blood test strip Use 1 each daily   • ibuprofen (MOTRIN) 600 mg tablet TAKE 1 TABLET EVERY 8 HOURS   • Icosapent Ethyl 1 g CAPS TAKE 1 CAPSULE TWICE A DAY (Patient not taking: Reported on 9/27/2022)   • indomethacin (INDOCIN) 50 mg capsule Take 1 capsule (50 mg total) by mouth 2 (two) times a day as needed for mild pain   • ONETOUCH DELICA LANCETS 21F MISC 1 Units by Does not apply route daily   • sildenafil (VIAGRA) 100 mg tablet Take 1 tablet (100 mg total) by mouth daily as needed for erectile dysfunction   • tamsulosin (FLOMAX) 0 4 mg Take 1 capsule (0 4 mg total) by mouth daily with dinner   • [DISCONTINUED] amLODIPine (NORVASC) 5 mg tablet Take 1 tablet (5 mg total) by mouth daily   • [DISCONTINUED] atorvastatin (LIPITOR) 80 mg tablet Take 1 tablet (80 mg total) by mouth daily   • [DISCONTINUED] Empagliflozin (Jardiance) 10 MG TABS Take 1 tablet (10 mg total) by mouth every morning   • [DISCONTINUED] losartan-hydrochlorothiazide (HYZAAR) 100-25 MG per tablet Take 1 tablet by mouth daily   • [DISCONTINUED] metFORMIN (GLUCOPHAGE-XR) 500 mg 24 hr tablet Take 2 tablets (1,000 mg total) by mouth 2 (two) times a day with meals   • [DISCONTINUED] methocarbamol (Robaxin-750) 750 mg tablet Take 1 tablet (750 mg total) by mouth every 6 (six) hours as needed for muscle spasms   • [DISCONTINUED] naproxen (Naprosyn) 500 mg tablet Take 1 tablet (500 mg total) by mouth 2 (two) times a day with meals     No Known Allergies  Immunization History   Administered Date(s) Administered   • COVID-19 PFIZER VACCINE 0 3 ML IM 04/05/2021, 04/26/2021   • INFLUENZA 09/14/2021, 10/07/2022   • Influenza Injectable, MDCK, Preservative Free, Quadrivalent, 0 5 mL 10/24/2018, 10/16/2019   • Influenza, recombinant, quadrivalent,injectable, preservative free 10/06/2020       Objective     /80   Pulse 76   Temp (!) 96 1 °F (35 6 °C)   Ht 6' 2" (1 88 m)   Wt (!) 146 kg (321 lb)   SpO2 96%   BMI 41 21 kg/m²     Physical Exam  Vitals and nursing note reviewed  Constitutional:       General: He is not in acute distress  Appearance: Normal appearance  HENT:      Head: Normocephalic and atraumatic  Nose: Nose normal       Mouth/Throat:      Mouth: Mucous membranes are moist       Pharynx: Oropharynx is clear  No oropharyngeal exudate or posterior oropharyngeal erythema  Eyes:      Pupils: Pupils are equal, round, and reactive to light  Cardiovascular:      Rate and Rhythm: Normal rate and regular rhythm  Pulses: no weak pulses          Dorsalis pedis pulses are 2+ on the right side and 2+ on the left side  Posterior tibial pulses are 2+ on the right side and 2+ on the left side  Heart sounds: Normal heart sounds  Pulmonary:      Effort: Pulmonary effort is normal  No respiratory distress  Breath sounds: Normal breath sounds  No wheezing, rhonchi or rales  Abdominal:      General: Bowel sounds are normal       Palpations: Abdomen is soft  Musculoskeletal:         General: Normal range of motion  Cervical back: Normal range of motion and neck supple  Feet:      Right foot:      Skin integrity: No ulcer, skin breakdown, erythema, warmth, callus or dry skin  Left foot:      Skin integrity: No ulcer, skin breakdown, erythema, warmth, callus or dry skin  Skin:     General: Skin is warm and dry  Neurological:      Mental Status: He is alert and oriented to person, place, and time  Psychiatric:         Mood and Affect: Mood and affect normal        Diabetic Foot Exam    Patient's shoes and socks removed  Right Foot/Ankle   Right Foot Inspection  Skin Exam: skin normal and skin intact  No dry skin, no warmth, no callus, no erythema, no maceration, no abnormal color, no pre-ulcer, no ulcer and no callus  Toe Exam: ROM and strength within normal limits       Sensory   Vibration: intact  Proprioception: intact  Monofilament testing: intact    Vascular  Capillary refills: < 3 seconds  The right DP pulse is 2+  The right PT pulse is 2+  Left Foot/Ankle  Left Foot Inspection  Skin Exam: skin normal and skin intact  No dry skin, no warmth, no erythema, no maceration, normal color, no pre-ulcer, no ulcer and no callus  Toe Exam: ROM and strength within normal limits  Sensory   Vibration: intact  Proprioception: intact  Monofilament testing: intact    Vascular  Capillary refills: < 3 seconds  The left DP pulse is 2+  The left PT pulse is 2+       Assign Risk Category  No deformity present  No loss of protective sensation  No weak pulses  Risk: 2 (bunion L 1st MTP, pt not interested in intervention, discussed risks of such)      Yamil Lam PA-C

## 2023-01-12 LAB
EST. AVERAGE GLUCOSE BLD GHB EST-MCNC: 163 MG/DL
HBA1C MFR BLD: 7.3 %

## 2023-02-13 DIAGNOSIS — N13.8 BPH WITH OBSTRUCTION/LOWER URINARY TRACT SYMPTOMS: ICD-10-CM

## 2023-02-13 DIAGNOSIS — N40.1 BPH WITH OBSTRUCTION/LOWER URINARY TRACT SYMPTOMS: ICD-10-CM

## 2023-02-13 RX ORDER — TAMSULOSIN HYDROCHLORIDE 0.4 MG/1
CAPSULE ORAL
Qty: 90 CAPSULE | Refills: 3 | Status: SHIPPED | OUTPATIENT
Start: 2023-02-13

## 2023-02-14 ENCOUNTER — OFFICE VISIT (OUTPATIENT)
Dept: UROLOGY | Facility: CLINIC | Age: 48
End: 2023-02-14

## 2023-02-14 VITALS
SYSTOLIC BLOOD PRESSURE: 124 MMHG | BODY MASS INDEX: 40.43 KG/M2 | HEIGHT: 74 IN | DIASTOLIC BLOOD PRESSURE: 82 MMHG | OXYGEN SATURATION: 98 % | WEIGHT: 315 LBS | HEART RATE: 66 BPM

## 2023-02-14 DIAGNOSIS — N52.8 OTHER MALE ERECTILE DYSFUNCTION: ICD-10-CM

## 2023-02-14 DIAGNOSIS — N40.0 BENIGN PROSTATIC HYPERPLASIA WITHOUT LOWER URINARY TRACT SYMPTOMS: Primary | ICD-10-CM

## 2023-02-14 DIAGNOSIS — N20.0 KIDNEY STONES: ICD-10-CM

## 2023-02-14 RX ORDER — SILDENAFIL 100 MG/1
100 TABLET, FILM COATED ORAL DAILY PRN
Qty: 30 TABLET | Refills: 3 | Status: SHIPPED | OUTPATIENT
Start: 2023-02-14

## 2023-02-14 NOTE — PROGRESS NOTES
UROLOGY PROGRESS NOTE   Patient Identifiers: Vijay Christensen (MRN [id number])  Date of Service: 2/14/2023    Subjective:   47-year-old man known from [facility] urology with history of kidney stones  He has been managed on allopurinol  He complained of urinary frequency and slow flow  He drinks about a gallon of water a day with lemon to avoid kidney stones  I put him on tamsulosin last year  PSA 0 4  Hemoglobin A1c 7  3  Less frequency on tamsulosin  Reason for visit: BPH follow-up    Objective:     VITALS:    There were no vitals filed for this visit          LABS:  Lab Results   Component Value Date    HGB 14 8 01/10/2023    HCT 46 4 01/10/2023    WBC 5 04 01/10/2023     01/10/2023   ]    Lab Results   Component Value Date     (L) 12/26/2017    K 3 9 01/10/2023     (H) 01/10/2023    CO2 23 01/10/2023    BUN 20 01/10/2023    CREATININE 0 88 01/10/2023    CALCIUM 9 7 01/10/2023    GLUCOSE 316 (H) 12/26/2017   ]        INPATIENT MEDS:    Current Outpatient Medications:   •  allopurinol (ZYLOPRIM) 300 mg tablet, Take 1 tablet (300 mg total) by mouth daily, Disp: 90 tablet, Rfl: 3  •  amLODIPine (NORVASC) 5 mg tablet, Take 1 tablet (5 mg total) by mouth daily, Disp: 90 tablet, Rfl: 3  •  atorvastatin (LIPITOR) 80 mg tablet, Take 1 tablet (80 mg total) by mouth daily, Disp: 90 tablet, Rfl: 3  •  Blood Glucose Monitoring Suppl (ONE TOUCH ULTRA 2) w/Device KIT, 1 kit by Does not apply route daily, Disp: , Rfl:   •  Choline Fenofibrate (Fenofibric Acid) 135 MG CPDR, Take 1 capsule (135 mg total) by mouth daily, Disp: 90 capsule, Rfl: 3  •  Empagliflozin (Jardiance) 10 MG TABS tablet, Take 1 tablet (10 mg total) by mouth every morning, Disp: 90 tablet, Rfl: 3  •  glucose blood test strip, Use 1 each daily, Disp: 100 each, Rfl: 5  •  ibuprofen (MOTRIN) 600 mg tablet, TAKE 1 TABLET EVERY 8 HOURS, Disp: 90 tablet, Rfl: 11  •  Icosapent Ethyl 1 g CAPS, TAKE 1 CAPSULE TWICE A DAY (Patient not taking: Reported on 9/27/2022), Disp: 180 capsule, Rfl: 1  •  indomethacin (INDOCIN) 50 mg capsule, Take 1 capsule (50 mg total) by mouth 2 (two) times a day as needed for mild pain, Disp: 60 capsule, Rfl: 2  •  losartan-hydrochlorothiazide (HYZAAR) 100-25 MG per tablet, Take 1 tablet by mouth daily, Disp: 90 tablet, Rfl: 3  •  metFORMIN (GLUCOPHAGE-XR) 500 mg 24 hr tablet, Take 2 tablets (1,000 mg total) by mouth 2 (two) times a day with meals, Disp: 360 tablet, Rfl: 3  •  methocarbamol (Robaxin-750) 750 mg tablet, Take 1 tablet (750 mg total) by mouth every 6 (six) hours as needed for muscle spasms, Disp: 30 tablet, Rfl: 0  •  ONETOUCH DELICA LANCETS 28L MISC, 1 Units by Does not apply route daily, Disp: , Rfl:   •  sildenafil (VIAGRA) 100 mg tablet, Take 1 tablet (100 mg total) by mouth daily as needed for erectile dysfunction, Disp: 30 tablet, Rfl: 3  •  tamsulosin (FLOMAX) 0 4 mg, TAKE 1 CAPSULE DAILY WITH DINNER, Disp: 90 capsule, Rfl: 3  •  Trulicity 1 5 PS/9 7ML injection, Inject 0 5 mL (1 5 mg total) under the skin once a week, Disp: 6 mL, Rfl: 3      Physical Exam:   There were no vitals taken for this visit  GEN: no acute distress    RESP: breathing comfortably with no accessory muscle use    ABD: soft, non-tender, non-distended   INCISION:    EXT: no significant peripheral edema   (Male): Penis circumcised, phallus normal, meatus patent  Testicles descended into scrotum bilaterally without masses nor tenderness  No inguinal hernias bilaterally  TAZ: Prostate is not enlarged at 30 grams  The prostate is not boggy  The prostate is not tender  No nodules noted      RADIOLOGY:   none     Assessment:   1  Bph  2  Nephrolithiasis  3  ED     Plan:   -Follow up in one year with psa and ultrasound prior  -  -  -

## 2023-03-02 DIAGNOSIS — E78.2 MIXED HYPERLIPIDEMIA: ICD-10-CM

## 2023-03-02 DIAGNOSIS — R52 PAIN: ICD-10-CM

## 2023-03-02 RX ORDER — ICOSAPENT ETHYL 1000 MG/1
CAPSULE ORAL
Qty: 180 CAPSULE | Refills: 3 | Status: SHIPPED | OUTPATIENT
Start: 2023-03-02

## 2023-03-02 RX ORDER — IBUPROFEN 600 MG/1
TABLET ORAL
Qty: 90 TABLET | Refills: 11 | Status: SHIPPED | OUTPATIENT
Start: 2023-03-02

## 2023-04-03 ENCOUNTER — RA CDI HCC (OUTPATIENT)
Dept: OTHER | Facility: HOSPITAL | Age: 48
End: 2023-04-03

## 2023-04-03 NOTE — PROGRESS NOTES
[address]  coding opportunities          Chart Reviewed number of suggestions sent to Provider: 2     Patients Insurance        Commercial Insurance: Donna 93     [id number]  [id number]

## 2023-06-21 ENCOUNTER — TELEPHONE (OUTPATIENT)
Dept: FAMILY MEDICINE CLINIC | Facility: CLINIC | Age: 48
End: 2023-06-21

## 2023-06-21 NOTE — TELEPHONE ENCOUNTER
Pt called to inquire about a new pap machine  He feels [employer] are horrible  He's wondering if we could go thru a new company  Its been 2 months since he requested new pap machine  Can someone call him to advise him whats going on

## 2023-06-21 NOTE — TELEPHONE ENCOUNTER
I spoke with Joie Gonsalves, advised him that I was unsure if his insurance would be accepted elsewhere but we tried [facility] for now  Will follow up   Initial order was from his pulmonologist

## 2023-06-23 NOTE — TELEPHONE ENCOUNTER
Spoke with Marilee Loera today, they needed his sleep study report   I faxed to Manolo Dockery at [facility] at [phone]

## 2023-06-27 LAB

## 2023-07-27 DIAGNOSIS — E78.1 HIGH TRIGLYCERIDES: ICD-10-CM

## 2023-07-28 RX ORDER — FENOFIBRIC ACID 135 MG/1
1 CAPSULE, DELAYED RELEASE ORAL DAILY
Qty: 90 CAPSULE | Refills: 3 | OUTPATIENT
Start: 2023-07-28

## 2023-10-05 DIAGNOSIS — M10.9 GOUT, UNSPECIFIED CAUSE, UNSPECIFIED CHRONICITY, UNSPECIFIED SITE: ICD-10-CM

## 2023-10-05 RX ORDER — ALLOPURINOL 300 MG/1
300 TABLET ORAL DAILY
Qty: 90 TABLET | Refills: 3 | Status: SHIPPED | OUTPATIENT
Start: 2023-10-05

## 2023-10-30 ENCOUNTER — TELEPHONE (OUTPATIENT)
Dept: SLEEP CENTER | Facility: CLINIC | Age: 48
End: 2023-10-30

## 2023-10-30 LAB
DME PARACHUTE DELIVERY DATE REQUESTED: NORMAL
DME PARACHUTE ITEM DESCRIPTION: NORMAL
DME PARACHUTE ORDER STATUS: NORMAL
DME PARACHUTE SUPPLIER NAME: NORMAL
DME PARACHUTE SUPPLIER PHONE: NORMAL

## 2023-11-01 DIAGNOSIS — M10.9 GOUT, UNSPECIFIED CAUSE, UNSPECIFIED CHRONICITY, UNSPECIFIED SITE: ICD-10-CM

## 2023-11-01 RX ORDER — ALLOPURINOL 300 MG/1
300 TABLET ORAL DAILY
Qty: 90 TABLET | Refills: 3 | Status: SHIPPED | OUTPATIENT
Start: 2023-11-01

## 2023-11-24 DIAGNOSIS — E11.9 TYPE 2 DIABETES MELLITUS WITHOUT COMPLICATION, WITHOUT LONG-TERM CURRENT USE OF INSULIN (HCC): ICD-10-CM

## 2023-11-24 RX ORDER — DULAGLUTIDE 1.5 MG/.5ML
1.5 INJECTION, SOLUTION SUBCUTANEOUS WEEKLY
Qty: 6 ML | Refills: 3 | Status: SHIPPED | OUTPATIENT
Start: 2023-11-24

## 2024-01-25 DIAGNOSIS — E11.9 TYPE 2 DIABETES MELLITUS WITHOUT COMPLICATION, WITHOUT LONG-TERM CURRENT USE OF INSULIN (HCC): ICD-10-CM

## 2024-01-25 RX ORDER — METFORMIN HYDROCHLORIDE 500 MG/1
1000 TABLET, EXTENDED RELEASE ORAL 2 TIMES DAILY WITH MEALS
Qty: 360 TABLET | Refills: 3 | OUTPATIENT
Start: 2024-01-25

## 2024-01-29 ENCOUNTER — TELEPHONE (OUTPATIENT)
Dept: FAMILY MEDICINE CLINIC | Facility: CLINIC | Age: 49
End: 2024-01-29

## 2024-01-29 DIAGNOSIS — I10 ESSENTIAL HYPERTENSION: ICD-10-CM

## 2024-01-29 DIAGNOSIS — E11.9 TYPE 2 DIABETES MELLITUS WITHOUT COMPLICATION, WITHOUT LONG-TERM CURRENT USE OF INSULIN (HCC): Primary | ICD-10-CM

## 2024-01-29 RX ORDER — METFORMIN HYDROCHLORIDE 500 MG/1
1000 TABLET, EXTENDED RELEASE ORAL 2 TIMES DAILY WITH MEALS
Qty: 120 TABLET | Refills: 0 | Status: SHIPPED | OUTPATIENT
Start: 2024-01-29 | End: 2024-01-30 | Stop reason: SDUPTHER

## 2024-01-29 NOTE — TELEPHONE ENCOUNTER
Patient called the medication refill line to make sure he had active labs. He is going tomorrow to [facility]. Advised there are recent active labs in chart from today.

## 2024-01-30 ENCOUNTER — RA CDI HCC (OUTPATIENT)
Dept: OTHER | Facility: HOSPITAL | Age: 49
End: 2024-01-30

## 2024-01-30 ENCOUNTER — APPOINTMENT (OUTPATIENT)
Dept: LAB | Facility: CLINIC | Age: 49
End: 2024-01-30
Payer: COMMERCIAL

## 2024-01-30 DIAGNOSIS — N40.0 BENIGN PROSTATIC HYPERPLASIA WITHOUT LOWER URINARY TRACT SYMPTOMS: ICD-10-CM

## 2024-01-30 DIAGNOSIS — E11.9 TYPE 2 DIABETES MELLITUS WITHOUT COMPLICATION, WITHOUT LONG-TERM CURRENT USE OF INSULIN (HCC): ICD-10-CM

## 2024-01-30 LAB
ALBUMIN SERPL BCP-MCNC: 4.6 G/DL (ref 3.5–5)
ALP SERPL-CCNC: 76 U/L (ref 34–104)
ALT SERPL W P-5'-P-CCNC: 33 U/L (ref 7–52)
ANION GAP SERPL CALCULATED.3IONS-SCNC: 9 MMOL/L
AST SERPL W P-5'-P-CCNC: 16 U/L (ref 13–39)
BASOPHILS # BLD AUTO: 0.04 THOUSANDS/ÂΜL (ref 0–0.1)
BASOPHILS NFR BLD AUTO: 1 % (ref 0–1)
BILIRUB SERPL-MCNC: 1.04 MG/DL (ref 0.2–1)
BUN SERPL-MCNC: 19 MG/DL (ref 5–25)
CALCIUM SERPL-MCNC: 9.5 MG/DL (ref 8.4–10.2)
CHLORIDE SERPL-SCNC: 101 MMOL/L (ref 96–108)
CHOLEST SERPL-MCNC: 150 MG/DL
CO2 SERPL-SCNC: 28 MMOL/L (ref 21–32)
CREAT SERPL-MCNC: 0.88 MG/DL (ref 0.6–1.3)
EOSINOPHIL # BLD AUTO: 0.17 THOUSAND/ÂΜL (ref 0–0.61)
EOSINOPHIL NFR BLD AUTO: 3 % (ref 0–6)
ERYTHROCYTE [DISTWIDTH] IN BLOOD BY AUTOMATED COUNT: 14.6 % (ref 11.6–15.1)
EST. AVERAGE GLUCOSE BLD GHB EST-MCNC: 194 MG/DL
GFR SERPL CREATININE-BSD FRML MDRD: 100 ML/MIN/1.73SQ M
GLUCOSE P FAST SERPL-MCNC: 209 MG/DL (ref 65–99)
HBA1C MFR BLD: 8.4 %
HCT VFR BLD AUTO: 49.1 % (ref 36.5–49.3)
HDLC SERPL-MCNC: 27 MG/DL
HGB BLD-MCNC: 16.3 G/DL (ref 12–17)
IMM GRANULOCYTES # BLD AUTO: 0.02 THOUSAND/UL (ref 0–0.2)
IMM GRANULOCYTES NFR BLD AUTO: 0 % (ref 0–2)
LDLC SERPL DIRECT ASSAY-MCNC: 56 MG/DL (ref 0–100)
LYMPHOCYTES # BLD AUTO: 1.81 THOUSANDS/ÂΜL (ref 0.6–4.47)
LYMPHOCYTES NFR BLD AUTO: 31 % (ref 14–44)
MCH RBC QN AUTO: 26.1 PG (ref 26.8–34.3)
MCHC RBC AUTO-ENTMCNC: 33.2 G/DL (ref 31.4–37.4)
MCV RBC AUTO: 79 FL (ref 82–98)
MONOCYTES # BLD AUTO: 0.54 THOUSAND/ÂΜL (ref 0.17–1.22)
MONOCYTES NFR BLD AUTO: 9 % (ref 4–12)
NEUTROPHILS # BLD AUTO: 3.26 THOUSANDS/ÂΜL (ref 1.85–7.62)
NEUTS SEG NFR BLD AUTO: 56 % (ref 43–75)
NRBC BLD AUTO-RTO: 0 /100 WBCS
PLATELET # BLD AUTO: 180 THOUSANDS/UL (ref 149–390)
PMV BLD AUTO: 11 FL (ref 8.9–12.7)
POTASSIUM SERPL-SCNC: 4.6 MMOL/L (ref 3.5–5.3)
PROT SERPL-MCNC: 7.2 G/DL (ref 6.4–8.4)
PSA SERPL-MCNC: 0.39 NG/ML (ref 0–4)
RBC # BLD AUTO: 6.24 MILLION/UL (ref 3.88–5.62)
SODIUM SERPL-SCNC: 138 MMOL/L (ref 135–147)
TRIGL SERPL-MCNC: 603 MG/DL
WBC # BLD AUTO: 5.84 THOUSAND/UL (ref 4.31–10.16)

## 2024-01-30 PROCEDURE — 80061 LIPID PANEL: CPT

## 2024-01-30 PROCEDURE — 80053 COMPREHEN METABOLIC PANEL: CPT

## 2024-01-30 PROCEDURE — 84153 ASSAY OF PSA TOTAL: CPT

## 2024-01-30 PROCEDURE — 36415 COLL VENOUS BLD VENIPUNCTURE: CPT

## 2024-01-30 PROCEDURE — 83036 HEMOGLOBIN GLYCOSYLATED A1C: CPT

## 2024-01-30 PROCEDURE — 83721 ASSAY OF BLOOD LIPOPROTEIN: CPT

## 2024-01-30 PROCEDURE — 82043 UR ALBUMIN QUANTITATIVE: CPT

## 2024-01-30 PROCEDURE — 85025 COMPLETE CBC W/AUTO DIFF WBC: CPT

## 2024-01-30 PROCEDURE — 82570 ASSAY OF URINE CREATININE: CPT

## 2024-01-30 RX ORDER — METFORMIN HYDROCHLORIDE 500 MG/1
1000 TABLET, EXTENDED RELEASE ORAL 2 TIMES DAILY WITH MEALS
Qty: 120 TABLET | Refills: 0 | Status: SHIPPED | OUTPATIENT
Start: 2024-01-30 | End: 2024-02-29

## 2024-01-30 NOTE — PROGRESS NOTES
HCC coding opportunities          Chart Reviewed number of suggestions sent to Provider: 3     Patients Insurance        Commercial Insurance: [employer] Commercial Insurance     E11.65  E11.59  E11.69

## 2024-01-31 LAB
CREAT UR-MCNC: >900 MG/DL
MICROALBUMIN UR-MCNC: 72.9 MG/L
MICROALBUMIN/CREAT 24H UR: <8 MG/G CREATININE (ref 0–30)

## 2024-02-07 DIAGNOSIS — E78.2 MIXED HYPERLIPIDEMIA: ICD-10-CM

## 2024-02-07 RX ORDER — ICOSAPENT ETHYL 1000 MG/1
CAPSULE ORAL
Qty: 180 CAPSULE | Refills: 0 | Status: SHIPPED | OUTPATIENT
Start: 2024-02-07

## 2024-02-08 DIAGNOSIS — N40.1 BPH WITH OBSTRUCTION/LOWER URINARY TRACT SYMPTOMS: ICD-10-CM

## 2024-02-08 DIAGNOSIS — N13.8 BPH WITH OBSTRUCTION/LOWER URINARY TRACT SYMPTOMS: ICD-10-CM

## 2024-02-08 RX ORDER — TAMSULOSIN HYDROCHLORIDE 0.4 MG/1
CAPSULE ORAL
Qty: 90 CAPSULE | Refills: 1 | Status: SHIPPED | OUTPATIENT
Start: 2024-02-08

## 2024-02-12 DIAGNOSIS — I10 ESSENTIAL HYPERTENSION: ICD-10-CM

## 2024-02-12 RX ORDER — AMLODIPINE BESYLATE 5 MG/1
5 TABLET ORAL DAILY
Qty: 15 TABLET | Refills: 0 | Status: SHIPPED | OUTPATIENT
Start: 2024-02-12 | End: 2024-02-20 | Stop reason: SDUPTHER

## 2024-02-20 ENCOUNTER — OFFICE VISIT (OUTPATIENT)
Dept: FAMILY MEDICINE CLINIC | Facility: CLINIC | Age: 49
End: 2024-02-20
Payer: COMMERCIAL

## 2024-02-20 VITALS
SYSTOLIC BLOOD PRESSURE: 136 MMHG | WEIGHT: 315 LBS | HEART RATE: 81 BPM | HEIGHT: 74 IN | TEMPERATURE: 97.8 F | BODY MASS INDEX: 40.43 KG/M2 | OXYGEN SATURATION: 96 % | DIASTOLIC BLOOD PRESSURE: 88 MMHG

## 2024-02-20 DIAGNOSIS — E78.1 HIGH TRIGLYCERIDES: ICD-10-CM

## 2024-02-20 DIAGNOSIS — E78.2 MIXED HYPERLIPIDEMIA: ICD-10-CM

## 2024-02-20 DIAGNOSIS — E11.9 TYPE 2 DIABETES MELLITUS WITHOUT COMPLICATION, WITHOUT LONG-TERM CURRENT USE OF INSULIN (HCC): Primary | ICD-10-CM

## 2024-02-20 DIAGNOSIS — G47.33 OSA (OBSTRUCTIVE SLEEP APNEA): ICD-10-CM

## 2024-02-20 DIAGNOSIS — M10.9 GOUT, UNSPECIFIED CAUSE, UNSPECIFIED CHRONICITY, UNSPECIFIED SITE: ICD-10-CM

## 2024-02-20 DIAGNOSIS — I15.2 HYPERTENSION COMPLICATING DIABETES: ICD-10-CM

## 2024-02-20 DIAGNOSIS — N40.1 BPH WITH OBSTRUCTION/LOWER URINARY TRACT SYMPTOMS: ICD-10-CM

## 2024-02-20 DIAGNOSIS — N13.8 BPH WITH OBSTRUCTION/LOWER URINARY TRACT SYMPTOMS: ICD-10-CM

## 2024-02-20 DIAGNOSIS — E66.01 MORBID OBESITY WITH BMI OF 40.0-44.9, ADULT (HCC): ICD-10-CM

## 2024-02-20 DIAGNOSIS — E11.69 HYPERLIPIDEMIA ASSOCIATED WITH TYPE 2 DIABETES MELLITUS: ICD-10-CM

## 2024-02-20 DIAGNOSIS — E78.5 HYPERLIPIDEMIA ASSOCIATED WITH TYPE 2 DIABETES MELLITUS: ICD-10-CM

## 2024-02-20 DIAGNOSIS — I10 ESSENTIAL HYPERTENSION: ICD-10-CM

## 2024-02-20 DIAGNOSIS — Z00.00 HEALTH MAINTENANCE EXAMINATION: ICD-10-CM

## 2024-02-20 DIAGNOSIS — E11.59 HYPERTENSION COMPLICATING DIABETES: ICD-10-CM

## 2024-02-20 LAB
LEFT EYE DIABETIC RETINOPATHY: NORMAL
LEFT EYE IMAGE QUALITY: NORMAL
LEFT EYE MACULAR EDEMA: NORMAL
LEFT EYE OTHER RETINOPATHY: NORMAL
RIGHT EYE DIABETIC RETINOPATHY: NORMAL
RIGHT EYE IMAGE QUALITY: NORMAL
RIGHT EYE MACULAR EDEMA: NORMAL
RIGHT EYE OTHER RETINOPATHY: NORMAL
SEVERITY (EYE EXAM): NORMAL

## 2024-02-20 PROCEDURE — 99396 PREV VISIT EST AGE 40-64: CPT | Performed by: PHYSICIAN ASSISTANT

## 2024-02-20 PROCEDURE — 92250 FUNDUS PHOTOGRAPHY W/I&R: CPT | Performed by: PHYSICIAN ASSISTANT

## 2024-02-20 PROCEDURE — 99214 OFFICE O/P EST MOD 30 MIN: CPT | Performed by: PHYSICIAN ASSISTANT

## 2024-02-20 RX ORDER — ATORVASTATIN CALCIUM 80 MG/1
80 TABLET, FILM COATED ORAL DAILY
Qty: 90 TABLET | Refills: 0 | Status: SHIPPED | OUTPATIENT
Start: 2024-02-20

## 2024-02-20 RX ORDER — DULAGLUTIDE 1.5 MG/.5ML
1.5 INJECTION, SOLUTION SUBCUTANEOUS WEEKLY
Qty: 6 ML | Refills: 3 | Status: CANCELLED | OUTPATIENT
Start: 2024-02-20

## 2024-02-20 RX ORDER — LOSARTAN POTASSIUM AND HYDROCHLOROTHIAZIDE 25; 100 MG/1; MG/1
1 TABLET ORAL DAILY
Qty: 90 TABLET | Refills: 0 | Status: SHIPPED | OUTPATIENT
Start: 2024-02-20

## 2024-02-20 RX ORDER — ALLOPURINOL 300 MG/1
300 TABLET ORAL DAILY
Qty: 90 TABLET | Refills: 0 | Status: SHIPPED | OUTPATIENT
Start: 2024-02-20

## 2024-02-20 RX ORDER — INDOMETHACIN 50 MG/1
50 CAPSULE ORAL 2 TIMES DAILY PRN
Qty: 60 CAPSULE | Refills: 0 | Status: SHIPPED | OUTPATIENT
Start: 2024-02-20

## 2024-02-20 RX ORDER — FENOFIBRIC ACID 135 MG/1
1 CAPSULE, DELAYED RELEASE ORAL DAILY
Qty: 90 CAPSULE | Refills: 0 | Status: SHIPPED | OUTPATIENT
Start: 2024-02-20

## 2024-02-20 RX ORDER — ICOSAPENT ETHYL 1000 MG/1
1 CAPSULE ORAL 2 TIMES DAILY
Qty: 180 CAPSULE | Refills: 0 | Status: SHIPPED | OUTPATIENT
Start: 2024-02-20

## 2024-02-20 RX ORDER — TAMSULOSIN HYDROCHLORIDE 0.4 MG/1
0.4 CAPSULE ORAL
Qty: 90 CAPSULE | Refills: 0 | Status: SHIPPED | OUTPATIENT
Start: 2024-02-20

## 2024-02-20 RX ORDER — METFORMIN HYDROCHLORIDE 500 MG/1
1000 TABLET, EXTENDED RELEASE ORAL 2 TIMES DAILY WITH MEALS
Qty: 360 TABLET | Refills: 0 | Status: SHIPPED | OUTPATIENT
Start: 2024-02-20 | End: 2024-05-20

## 2024-02-20 RX ORDER — AMLODIPINE BESYLATE 5 MG/1
5 TABLET ORAL DAILY
Qty: 90 TABLET | Refills: 0 | Status: SHIPPED | OUTPATIENT
Start: 2024-02-20

## 2024-02-20 NOTE — PROGRESS NOTES
Name: Todd Causey      : 1975      MRN: [id number]  Encounter Provider: Heladio Todd PA-C  Encounter Date: 2024   Encounter department: [facility]    Assessment & Plan     1. Type 2 diabetes mellitus without complication, without long-term current use of insulin (HCC)  -     Empagliflozin (Jardiance) 10 MG TABS tablet; Take 1 tablet (10 mg total) by mouth every morning  -     metFORMIN (GLUCOPHAGE-XR) 500 mg 24 hr tablet; Take 2 tablets (1,000 mg total) by mouth 2 (two) times a day with meals  -     dulaglutide (Trulicity) 3 MG/0.5ML injection; Inject 0.5 mL (3 mg total) under the skin every 7 days  -     Comprehensive metabolic panel; Future; Expected date: 2024  -     CBC and differential; Future; Expected date: 2024  -     Hemoglobin A1C; Future; Expected date: 2024  -     Lipid Panel with Direct LDL reflex; Future; Expected date: 2024  -     IRIS Diabetic eye exam    2. BPH with obstruction/lower urinary tract symptoms  -     tamsulosin (FLOMAX) 0.4 mg; Take 1 capsule (0.4 mg total) by mouth daily with dinner    3. Essential hypertension  -     amLODIPine (NORVASC) 5 mg tablet; Take 1 tablet (5 mg total) by mouth daily  -     losartan-hydrochlorothiazide (HYZAAR) 100-25 MG per tablet; Take 1 tablet by mouth daily    4. Gout, unspecified cause, unspecified chronicity, unspecified site  -     allopurinol (ZYLOPRIM) 300 mg tablet; Take 1 tablet (300 mg total) by mouth daily  -     indomethacin (INDOCIN) 50 mg capsule; Take 1 capsule (50 mg total) by mouth 2 (two) times a day as needed for mild pain    5. High triglycerides  -     Choline Fenofibrate (Fenofibric Acid) 135 MG CPDR; Take 1 capsule (135 mg total) by mouth daily    6. Mixed hyperlipidemia  -     atorvastatin (LIPITOR) 80 mg tablet; Take 1 tablet (80 mg total) by mouth daily  -     Icosapent Ethyl 1 g CAPS; Take 1 capsule (1 g total) by mouth 2 (two) times a day    7. Hyperlipidemia associated  with type 2 diabetes mellitus     8. Hypertension complicating diabetes     9. SANCHO (obstructive sleep apnea)    10. Morbid obesity with BMI of 40.0-44.9, adult ([facility])    11. Health maintenance examination    Hx reviewed and updated. DM not controlled. Will increase Trulicity to 3mg weekly. Continue jardiance and metformin. BMI/lifestyle counseling as below. Also important for lipid control. Continue statin, fibrate, omega-3 supplement. BP borderline, continue current regimen. 3 month follow up with labs prior, earlier prn. Utd with CRC screening, PSA.     BMI Counseling: Body mass index is 40.83 kg/m². The BMI is above normal. Nutrition recommendations include reducing portion sizes, decreasing overall calorie intake, moderation in carbohydrate intake, increasing intake of lean protein, reducing intake of saturated fat and trans fat, and reducing intake of cholesterol. Exercise recommendations include moderate aerobic physical activity for 150 minutes/week.         Subjective     Pt presents for routine follow up and annual physical. He has been lost to follow up over the past year     DM: a1c increased to 8.4,, on jardiance, trulicity, metformin. Notes poor diet. +statin, arb  HLD: on high dose statin, fibrate, fish oil, triglycerides still high   HTN: 136/88 on recheck, on hyzaar, amlodipine. no end organ complaints  SANCHO: compliant with CPAP  Gout: on chronic colchicine, prn indomethacin    No interval changes. No changes in FH. Utd with CRC screening and PSA. Not smoking. No abuse/misuse of alcohol or illicit drugs. Meds/allergies reviewed.     Recent Results (from the past 672 hour(s))  -PSA Total, Diagnostic:   Collection Time: 01/30/24  8:45 AM       Result                      Value             Ref Range           PSA, Diagnostic             0.39              0.00 - 4.00 *  -Albumin / creatinine urine ratio:   Collection Time: 01/30/24  8:45 AM       Result                      Value             Ref Range            Creatinine, Ur              >900.0            Reference ra*       Albumin,U,Random            72.9 (H)          <20.0 mg/L          Albumin Creat Ratio         <8                0 - 30 mg/g *  -Comprehensive metabolic panel:   Collection Time: 01/30/24  8:45 AM       Result                      Value             Ref Range           Sodium                      138               135 - 147 mm*       Potassium                   4.6               3.5 - 5.3 mm*       Chloride                    101               96 - 108 mmo*       CO2                         28                21 - 32 mmol*       ANION GAP                   9                 mmol/L              BUN                         19                5 - 25 mg/dL        Creatinine                  0.88              0.60 - 1.30 *       Glucose, Fasting            209 (H)           65 - 99 mg/dL       Calcium                     9.5               8.4 - 10.2 m*       AST                         16                13 - 39 U/L         ALT                         33                7 - 52 U/L          Alkaline Phosphatase        76                34 - 104 U/L        Total Protein               7.2               6.4 - 8.4 g/*       Albumin                     4.6               3.5 - 5.0 g/*       Total Bilirubin             1.04 (H)          0.20 - 1.00 *       eGFR                        100               ml/min/1.73s*  -Hemoglobin A1C:   Collection Time: 01/30/24  8:45 AM       Result                      Value             Ref Range           Hemoglobin A1C              8.4 (H)           Normal 4.0-5*       EAG                         194               mg/dl          -CBC and differential:   Collection Time: 01/30/24  8:45 AM       Result                      Value             Ref Range           WBC                         5.84              4.31 - 10.16*       RBC                         6.24 (H)          3.88 - 5.62 *       Hemoglobin                  16.3               12.0 - 17.0 *       Hematocrit                  49.1              36.5 - 49.3 %       MCV                         79 (L)            82 - 98 fL          MCH                         26.1 (L)          26.8 - 34.3 *       MCHC                        33.2              31.4 - 37.4 *       RDW                         14.6              11.6 - 15.1 %       MPV                         11.0              8.9 - 12.7 fL       Platelets                   180               149 - 390 Th*       nRBC                        0                 /100 WBCs           Neutrophils Relative        56                43 - 75 %           Immat GRANS %               0                 0 - 2 %             Lymphocytes Relative        31                14 - 44 %           Monocytes Relative          9                 4 - 12 %            Eosinophils Relative        3                 0 - 6 %             Basophils Relative          1                 0 - 1 %             Neutrophils Absolute        3.26              1.85 - 7.62 *       Immature Grans Absolute     0.02              0.00 - 0.20 *       Lymphocytes Absolute        1.81              0.60 - 4.47 *       Monocytes Absolute          0.54              0.17 - 1.22 *       Eosinophils Absolute        0.17              0.00 - 0.61 *       Basophils Absolute          0.04              0.00 - 0.10 *  -Lipid Panel with Direct LDL reflex:   Collection Time: 01/30/24  8:45 AM       Result                      Value             Ref Range           Cholesterol                 150               See Comment *       Triglycerides               603 (H)           See Comment *       HDL, Direct                 27 (L)            >=40 mg/dL          LDL Calculated                                               -LDL cholesterol, direct:   Collection Time: 01/30/24  8:45 AM       Result                      Value             Ref Range           LDL Direct                  56                0 - 100  mg/dl          Review of Systems   Constitutional:  Negative for chills, fatigue and fever.   HENT:  Negative for congestion, ear pain, hearing loss, nosebleeds, postnasal drip, rhinorrhea, sinus pressure, sinus pain, sneezing and sore throat.    Eyes:  Negative for pain, discharge, itching and visual disturbance.   Respiratory:  Negative for cough, chest tightness, shortness of breath and wheezing.    Cardiovascular:  Negative for chest pain, palpitations and leg swelling.   Gastrointestinal:  Negative for abdominal pain, blood in stool, constipation, diarrhea, nausea and vomiting.   Genitourinary:  Negative for frequency and urgency.   Neurological:  Negative for dizziness, light-headedness and numbness.       Past Medical History:   Diagnosis Date   • CPAP (continuous positive airway pressure) dependence    • Diabetes (HCC)    • Diabetes mellitus (HCC)    • Gout    • HTN (hypertension)    • Hyperlipidemia    • Kidney stone     RIGHT   • Morbid obesity with BMI of 40.0-44.9, adult (HCC)    • SANCHO on CPAP      Past Surgical History:   Procedure Laterality Date   • GANGLION CYST EXCISION Left 2/19/2019    Procedure: WRIST GANGLION CYST EXCISION;  Surgeon: Kana Hanson MD;  Location: [facility];  Service: Orthopedics   • VT CYSTO/URETERO W/LITHOTRIPSY &INDWELL STENT INSRT Right 5/1/2017    Procedure: CYSTOSCOPY URETEROSCOPY WITH LITHOTRIPSY HOLMIUM LASER, RETROGRADE PYELOGRAM AND INSERTION STENT URETERAL;  Surgeon: Zoltan Whitney MD;  Location: [facility];  Service: Urology   • VT LITHOTRIPSY XTRCORP SHOCK WAVE Left 6/19/2017    Procedure: LITHROTRIPSY EXTRACORPORAL SHOCKWAVE (ESWL);  Surgeon: Zoltan Whitney MD;  Location: [facility];  Service: Urology   • VT NDSC WRST SURG W/RLS TRANSVRS CARPL LIGM Left 2/19/2019    Procedure: ENDOSCOPIC CARPAL TUNNEL RELEASE;  Surgeon: Kana Hanson MD;  Location: [facility];  Service: Orthopedics     Family History   Problem Relation Age of Onset   • Bone cancer Father    •  Prostate cancer Maternal Grandfather    • Cancer Family    • Hypertension Family      Social History     Socioeconomic History   • Marital status: /Civil Union     Spouse name: None   • Number of children: None   • Years of education: None   • Highest education level: None   Occupational History   • None   Tobacco Use   • Smoking status: Former     Current packs/day: 0.00     Types: Cigarettes     Quit date: 2009     Years since quitting: 15.1   • Smokeless tobacco: Former     Types: Chew   • Tobacco comments:     Former Smoker as per allscripts   Substance and Sexual Activity   • Alcohol use: Yes     Alcohol/week: 1.0 standard drink of alcohol     Types: 1 Cans of beer per week     Comment: socially   • Drug use: No   • Sexual activity: None   Other Topics Concern   • None   Social History Narrative    Daily Coffee Consumption    Daily Tea Consumption     Social Determinants of Health     Financial Resource Strain: Not on file   Food Insecurity: Not on file   Transportation Needs: Not on file   Physical Activity: Not on file   Stress: Not on file (2/11/2021)   Social Connections: Not on file   Intimate Partner Violence: Low Risk  (4/13/2021)    Received from [facility]    Intimate Partner Violence    • Insults You: Not on file    • Threatens You: Not on file    • Screams at You: Not on file    • Physically Hurt: Not on file    • Intimate Partner Violence Score: Not on file   Housing Stability: Not on file     Current Outpatient Medications on File Prior to Visit   Medication Sig   • [DISCONTINUED] Empagliflozin (Jardiance) 10 MG TABS tablet Take 1 tablet (10 mg total) by mouth every morning   • [DISCONTINUED] metFORMIN (GLUCOPHAGE-XR) 500 mg 24 hr tablet Take 2 tablets (1,000 mg total) by mouth 2 (two) times a day with meals   • [DISCONTINUED] Trulicity 1.5 MG/0.5ML injection INJECT 0.5 ML (1.5 MG TOTAL) UNDER THE SKIN ONCE A WEEK   • Blood Glucose Monitoring Suppl (ONE TOUCH ULTRA 2) w/Device KIT 1 kit  "by Does not apply route daily   • glucose blood test strip Use 1 each daily   • ibuprofen (MOTRIN) 600 mg tablet TAKE 1 TABLET EVERY 8 HOURS   • methocarbamol (Robaxin-750) 750 mg tablet Take 1 tablet (750 mg total) by mouth every 6 (six) hours as needed for muscle spasms   • ONETOUCH DELICA LANCETS 33G MISC 1 Units by Does not apply route daily   • sildenafil (VIAGRA) 100 mg tablet Take 1 tablet (100 mg total) by mouth daily as needed for erectile dysfunction   • [DISCONTINUED] allopurinol (ZYLOPRIM) 300 mg tablet Take 1 tablet (300 mg total) by mouth daily   • [DISCONTINUED] amLODIPine (NORVASC) 5 mg tablet TAKE 1 TABLET DAILY   • [DISCONTINUED] atorvastatin (LIPITOR) 80 mg tablet Take 1 tablet (80 mg total) by mouth daily   • [DISCONTINUED] Choline Fenofibrate (Fenofibric Acid) 135 MG CPDR Take 1 capsule (135 mg total) by mouth daily   • [DISCONTINUED] Icosapent Ethyl 1 g CAPS TAKE 1 CAPSULE TWICE A DAY   • [DISCONTINUED] indomethacin (INDOCIN) 50 mg capsule Take 1 capsule (50 mg total) by mouth 2 (two) times a day as needed for mild pain   • [DISCONTINUED] losartan-hydrochlorothiazide (HYZAAR) 100-25 MG per tablet Take 1 tablet by mouth daily   • [DISCONTINUED] tamsulosin (FLOMAX) 0.4 mg TAKE 1 CAPSULE DAILY WITH DINNER     No Known Allergies  Immunization History   Administered Date(s) Administered   • COVID-19 PFIZER VACCINE 0.3 ML IM 04/05/2021, 04/26/2021, 11/23/2021   • COVID-19 Pfizer Vac BIVALENT Dwight-sucrose 12 Yr+ IM 12/07/2022   • INFLUENZA 09/14/2021, 10/07/2022, 09/12/2023   • Influenza Injectable, MDCK, Preservative Free, Quadrivalent, 0.5 mL 10/24/2018, 10/16/2019   • Influenza, recombinant, quadrivalent,injectable, preservative free 10/06/2020       Objective     /88 (BP Location: Left arm, Patient Position: Sitting, Cuff Size: Large)   Pulse 81   Temp 97.8 °F (36.6 °C)   Ht 6' 2\" (1.88 m)   Wt (!) 144 kg (318 lb)   SpO2 96%   BMI 40.83 kg/m²     Physical Exam  Vitals and nursing note " reviewed.   Constitutional:       General: He is not in acute distress.     Appearance: Normal appearance.   HENT:      Head: Normocephalic and atraumatic.      Nose: Nose normal.      Mouth/Throat:      Mouth: Mucous membranes are moist.      Pharynx: Oropharynx is clear. No oropharyngeal exudate or posterior oropharyngeal erythema.   Eyes:      Pupils: Pupils are equal, round, and reactive to light.   Cardiovascular:      Rate and Rhythm: Normal rate and regular rhythm.      Heart sounds: Normal heart sounds. No murmur heard.  Pulmonary:      Effort: Pulmonary effort is normal. No respiratory distress.      Breath sounds: Normal breath sounds. No wheezing, rhonchi or rales.   Abdominal:      General: Bowel sounds are normal. There is no distension.      Palpations: Abdomen is soft.      Tenderness: There is no abdominal tenderness. There is no guarding.   Musculoskeletal:         General: Normal range of motion.      Cervical back: Normal range of motion and neck supple.      Right lower leg: No edema.      Left lower leg: No edema.   Skin:     General: Skin is warm and dry.   Neurological:      Mental Status: He is alert and oriented to person, place, and time.   Psychiatric:         Mood and Affect: Mood and affect normal.       Heladio Todd PA-C

## 2024-03-13 DIAGNOSIS — M54.50 ACUTE LEFT-SIDED LOW BACK PAIN WITHOUT SCIATICA: ICD-10-CM

## 2024-03-13 DIAGNOSIS — N52.8 OTHER MALE ERECTILE DYSFUNCTION: ICD-10-CM

## 2024-03-13 DIAGNOSIS — M10.9 GOUT, UNSPECIFIED CAUSE, UNSPECIFIED CHRONICITY, UNSPECIFIED SITE: ICD-10-CM

## 2024-03-14 RX ORDER — METHOCARBAMOL 750 MG/1
TABLET, FILM COATED ORAL
Qty: 30 TABLET | Refills: 1 | Status: SHIPPED | OUTPATIENT
Start: 2024-03-14

## 2024-03-14 RX ORDER — SILDENAFIL 100 MG/1
TABLET, FILM COATED ORAL
Qty: 30 TABLET | Refills: 0 | Status: SHIPPED | OUTPATIENT
Start: 2024-03-14

## 2024-03-14 RX ORDER — INDOMETHACIN 50 MG/1
CAPSULE ORAL
Qty: 60 CAPSULE | Refills: 1 | Status: SHIPPED | OUTPATIENT
Start: 2024-03-14 | End: 2024-03-18 | Stop reason: SDUPTHER

## 2024-03-14 NOTE — TELEPHONE ENCOUNTER
I will refill both medications with 1 refill only. As I am covering for Heladio as he is out of the office.

## 2024-03-14 NOTE — TELEPHONE ENCOUNTER
Refill must be reviewed and completed by the office or provider. The refill is unable to be approved by the medication management team.    Patient has not been seen since 2/14/23, was to return in 1 year for f/u

## 2024-03-18 DIAGNOSIS — M10.9 GOUT, UNSPECIFIED CAUSE, UNSPECIFIED CHRONICITY, UNSPECIFIED SITE: ICD-10-CM

## 2024-03-18 RX ORDER — INDOMETHACIN 50 MG/1
CAPSULE ORAL
Qty: 60 CAPSULE | Refills: 1 | Status: SHIPPED | OUTPATIENT
Start: 2024-03-18

## 2024-05-10 DIAGNOSIS — E11.9 TYPE 2 DIABETES MELLITUS WITHOUT COMPLICATION, WITHOUT LONG-TERM CURRENT USE OF INSULIN (HCC): ICD-10-CM

## 2024-05-13 DIAGNOSIS — E11.9 TYPE 2 DIABETES MELLITUS WITHOUT COMPLICATION, WITHOUT LONG-TERM CURRENT USE OF INSULIN (HCC): ICD-10-CM

## 2024-05-14 RX ORDER — EMPAGLIFLOZIN 10 MG/1
10 TABLET, FILM COATED ORAL EVERY MORNING
Qty: 90 TABLET | Refills: 3 | Status: SHIPPED | OUTPATIENT
Start: 2024-05-14

## 2024-05-30 DIAGNOSIS — R52 PAIN: ICD-10-CM

## 2024-05-30 DIAGNOSIS — I10 ESSENTIAL HYPERTENSION: ICD-10-CM

## 2024-05-30 DIAGNOSIS — E78.2 MIXED HYPERLIPIDEMIA: ICD-10-CM

## 2024-05-30 RX ORDER — ATORVASTATIN CALCIUM 80 MG/1
80 TABLET, FILM COATED ORAL DAILY
Qty: 90 TABLET | Refills: 1 | Status: SHIPPED | OUTPATIENT
Start: 2024-05-30

## 2024-05-30 RX ORDER — LOSARTAN POTASSIUM AND HYDROCHLOROTHIAZIDE 25; 100 MG/1; MG/1
1 TABLET ORAL DAILY
Qty: 90 TABLET | Refills: 1 | Status: SHIPPED | OUTPATIENT
Start: 2024-05-30

## 2024-05-31 RX ORDER — IBUPROFEN 600 MG/1
600 TABLET ORAL EVERY 8 HOURS
Qty: 90 TABLET | Refills: 5 | Status: SHIPPED | OUTPATIENT
Start: 2024-05-31

## 2024-06-11 ENCOUNTER — APPOINTMENT (OUTPATIENT)
Dept: LAB | Facility: CLINIC | Age: 49
End: 2024-06-11
Payer: COMMERCIAL

## 2024-06-11 DIAGNOSIS — E11.9 TYPE 2 DIABETES MELLITUS WITHOUT COMPLICATION, WITHOUT LONG-TERM CURRENT USE OF INSULIN (HCC): ICD-10-CM

## 2024-06-11 DIAGNOSIS — E78.1 HIGH TRIGLYCERIDES: ICD-10-CM

## 2024-06-11 DIAGNOSIS — N13.8 BPH WITH OBSTRUCTION/LOWER URINARY TRACT SYMPTOMS: ICD-10-CM

## 2024-06-11 DIAGNOSIS — N40.1 BPH WITH OBSTRUCTION/LOWER URINARY TRACT SYMPTOMS: ICD-10-CM

## 2024-06-11 DIAGNOSIS — M54.50 ACUTE LEFT-SIDED LOW BACK PAIN WITHOUT SCIATICA: ICD-10-CM

## 2024-06-11 DIAGNOSIS — N52.8 OTHER MALE ERECTILE DYSFUNCTION: ICD-10-CM

## 2024-06-11 DIAGNOSIS — E78.2 MIXED HYPERLIPIDEMIA: ICD-10-CM

## 2024-06-11 DIAGNOSIS — M10.9 GOUT, UNSPECIFIED CAUSE, UNSPECIFIED CHRONICITY, UNSPECIFIED SITE: ICD-10-CM

## 2024-06-11 DIAGNOSIS — I10 ESSENTIAL HYPERTENSION: ICD-10-CM

## 2024-06-11 LAB
ALBUMIN SERPL BCP-MCNC: 4.5 G/DL (ref 3.5–5)
ALP SERPL-CCNC: 51 U/L (ref 34–104)
ALT SERPL W P-5'-P-CCNC: 42 U/L (ref 7–52)
ANION GAP SERPL CALCULATED.3IONS-SCNC: 13 MMOL/L (ref 4–13)
AST SERPL W P-5'-P-CCNC: 28 U/L (ref 13–39)
BASOPHILS # BLD AUTO: 0.03 THOUSANDS/ÂΜL (ref 0–0.1)
BASOPHILS NFR BLD AUTO: 1 % (ref 0–1)
BILIRUB SERPL-MCNC: 1.25 MG/DL (ref 0.2–1)
BUN SERPL-MCNC: 18 MG/DL (ref 5–25)
CALCIUM SERPL-MCNC: 9.7 MG/DL (ref 8.4–10.2)
CHLORIDE SERPL-SCNC: 100 MMOL/L (ref 96–108)
CHOLEST SERPL-MCNC: 122 MG/DL
CO2 SERPL-SCNC: 24 MMOL/L (ref 21–32)
CREAT SERPL-MCNC: 0.9 MG/DL (ref 0.6–1.3)
EOSINOPHIL # BLD AUTO: 0.14 THOUSAND/ÂΜL (ref 0–0.61)
EOSINOPHIL NFR BLD AUTO: 3 % (ref 0–6)
ERYTHROCYTE [DISTWIDTH] IN BLOOD BY AUTOMATED COUNT: 14.4 % (ref 11.6–15.1)
EST. AVERAGE GLUCOSE BLD GHB EST-MCNC: 166 MG/DL
GFR SERPL CREATININE-BSD FRML MDRD: 98 ML/MIN/1.73SQ M
GLUCOSE P FAST SERPL-MCNC: 180 MG/DL (ref 65–99)
HBA1C MFR BLD: 7.4 %
HCT VFR BLD AUTO: 46 % (ref 36.5–49.3)
HDLC SERPL-MCNC: 26 MG/DL
HGB BLD-MCNC: 15.3 G/DL (ref 12–17)
IMM GRANULOCYTES # BLD AUTO: 0.02 THOUSAND/UL (ref 0–0.2)
IMM GRANULOCYTES NFR BLD AUTO: 0 % (ref 0–2)
LYMPHOCYTES # BLD AUTO: 1.37 THOUSANDS/ÂΜL (ref 0.6–4.47)
LYMPHOCYTES NFR BLD AUTO: 28 % (ref 14–44)
MCH RBC QN AUTO: 26 PG (ref 26.8–34.3)
MCHC RBC AUTO-ENTMCNC: 33.3 G/DL (ref 31.4–37.4)
MCV RBC AUTO: 78 FL (ref 82–98)
MONOCYTES # BLD AUTO: 0.38 THOUSAND/ÂΜL (ref 0.17–1.22)
MONOCYTES NFR BLD AUTO: 8 % (ref 4–12)
NEUTROPHILS # BLD AUTO: 3.05 THOUSANDS/ÂΜL (ref 1.85–7.62)
NEUTS SEG NFR BLD AUTO: 60 % (ref 43–75)
NRBC BLD AUTO-RTO: 0 /100 WBCS
PLATELET # BLD AUTO: 184 THOUSANDS/UL (ref 149–390)
PMV BLD AUTO: 10.8 FL (ref 8.9–12.7)
POTASSIUM SERPL-SCNC: 4.1 MMOL/L (ref 3.5–5.3)
PROT SERPL-MCNC: 7 G/DL (ref 6.4–8.4)
RBC # BLD AUTO: 5.88 MILLION/UL (ref 3.88–5.62)
SODIUM SERPL-SCNC: 137 MMOL/L (ref 135–147)
TRIGL SERPL-MCNC: 428 MG/DL
WBC # BLD AUTO: 4.99 THOUSAND/UL (ref 4.31–10.16)

## 2024-06-11 PROCEDURE — 80061 LIPID PANEL: CPT

## 2024-06-11 PROCEDURE — 80053 COMPREHEN METABOLIC PANEL: CPT

## 2024-06-11 PROCEDURE — 83036 HEMOGLOBIN GLYCOSYLATED A1C: CPT

## 2024-06-11 PROCEDURE — 83721 ASSAY OF BLOOD LIPOPROTEIN: CPT

## 2024-06-11 PROCEDURE — 36415 COLL VENOUS BLD VENIPUNCTURE: CPT

## 2024-06-11 PROCEDURE — 85025 COMPLETE CBC W/AUTO DIFF WBC: CPT

## 2024-06-11 RX ORDER — SILDENAFIL 100 MG/1
TABLET, FILM COATED ORAL
Qty: 30 TABLET | Refills: 0 | OUTPATIENT
Start: 2024-06-11

## 2024-06-11 RX ORDER — FENOFIBRIC ACID 135 MG/1
1 CAPSULE, DELAYED RELEASE ORAL DAILY
Qty: 90 CAPSULE | Refills: 0 | Status: SHIPPED | OUTPATIENT
Start: 2024-06-11

## 2024-06-11 RX ORDER — METHOCARBAMOL 750 MG/1
TABLET, FILM COATED ORAL
Qty: 30 TABLET | Refills: 1 | OUTPATIENT
Start: 2024-06-11

## 2024-06-11 RX ORDER — ALLOPURINOL 300 MG/1
300 TABLET ORAL DAILY
Qty: 90 TABLET | Refills: 0 | Status: SHIPPED | OUTPATIENT
Start: 2024-06-11

## 2024-06-11 RX ORDER — METFORMIN HYDROCHLORIDE 500 MG/1
1000 TABLET, EXTENDED RELEASE ORAL 2 TIMES DAILY WITH MEALS
Qty: 360 TABLET | Refills: 0 | Status: SHIPPED | OUTPATIENT
Start: 2024-06-11 | End: 2024-09-09

## 2024-06-11 RX ORDER — ICOSAPENT ETHYL 1 G/1
1 CAPSULE ORAL 2 TIMES DAILY
Qty: 180 CAPSULE | Refills: 0 | Status: SHIPPED | OUTPATIENT
Start: 2024-06-11

## 2024-06-11 RX ORDER — TAMSULOSIN HYDROCHLORIDE 0.4 MG/1
0.4 CAPSULE ORAL
Qty: 90 CAPSULE | Refills: 0 | Status: SHIPPED | OUTPATIENT
Start: 2024-06-11

## 2024-06-11 RX ORDER — AMLODIPINE BESYLATE 5 MG/1
5 TABLET ORAL DAILY
Qty: 90 TABLET | Refills: 0 | Status: SHIPPED | OUTPATIENT
Start: 2024-06-11

## 2024-06-11 NOTE — TELEPHONE ENCOUNTER
Pended all scripts that were sent by you on 2/2024 with no refills. These would be due if patient still needs them.

## 2024-06-11 NOTE — TELEPHONE ENCOUNTER
PT called in to inform Dr. Todd that he got his blood work done this morning.     Also, pt stated he got a call from [facility] that some medications need to be reordered. PT did not know off hand which medications they were?    PT would like Dr. Todd to review his medications and to submit reorders for whichever medications that require reorders.     Please advise & call pt for any further information or questions. Thank you!    Todd Causey (Self)   [phone] (Mobile)

## 2024-06-12 DIAGNOSIS — N40.1 BPH WITH OBSTRUCTION/LOWER URINARY TRACT SYMPTOMS: ICD-10-CM

## 2024-06-12 DIAGNOSIS — I10 ESSENTIAL HYPERTENSION: ICD-10-CM

## 2024-06-12 DIAGNOSIS — N13.8 BPH WITH OBSTRUCTION/LOWER URINARY TRACT SYMPTOMS: ICD-10-CM

## 2024-06-12 DIAGNOSIS — M10.9 GOUT, UNSPECIFIED CAUSE, UNSPECIFIED CHRONICITY, UNSPECIFIED SITE: ICD-10-CM

## 2024-06-12 DIAGNOSIS — E11.9 TYPE 2 DIABETES MELLITUS WITHOUT COMPLICATION, WITHOUT LONG-TERM CURRENT USE OF INSULIN (HCC): ICD-10-CM

## 2024-06-12 DIAGNOSIS — E78.1 HIGH TRIGLYCERIDES: ICD-10-CM

## 2024-06-12 DIAGNOSIS — N52.8 OTHER MALE ERECTILE DYSFUNCTION: ICD-10-CM

## 2024-06-12 DIAGNOSIS — E78.2 MIXED HYPERLIPIDEMIA: ICD-10-CM

## 2024-06-12 DIAGNOSIS — M54.50 ACUTE LEFT-SIDED LOW BACK PAIN WITHOUT SCIATICA: ICD-10-CM

## 2024-06-12 LAB — LDLC SERPL DIRECT ASSAY-MCNC: 52 MG/DL (ref 0–100)

## 2024-06-13 RX ORDER — AMLODIPINE BESYLATE 5 MG/1
5 TABLET ORAL DAILY
Qty: 90 TABLET | Refills: 0 | OUTPATIENT
Start: 2024-06-13

## 2024-06-13 RX ORDER — FENOFIBRIC ACID 135 MG/1
1 CAPSULE, DELAYED RELEASE ORAL DAILY
Qty: 90 CAPSULE | Refills: 0 | OUTPATIENT
Start: 2024-06-13

## 2024-06-13 RX ORDER — ALLOPURINOL 300 MG/1
300 TABLET ORAL DAILY
Qty: 90 TABLET | Refills: 0 | OUTPATIENT
Start: 2024-06-13

## 2024-06-13 RX ORDER — TAMSULOSIN HYDROCHLORIDE 0.4 MG/1
0.4 CAPSULE ORAL
Qty: 90 CAPSULE | Refills: 0 | OUTPATIENT
Start: 2024-06-13

## 2024-06-13 RX ORDER — METHOCARBAMOL 750 MG/1
TABLET, FILM COATED ORAL
Qty: 30 TABLET | Refills: 0 | OUTPATIENT
Start: 2024-06-13

## 2024-06-13 RX ORDER — METFORMIN HYDROCHLORIDE 500 MG/1
1000 TABLET, EXTENDED RELEASE ORAL 2 TIMES DAILY WITH MEALS
Qty: 360 TABLET | Refills: 0 | OUTPATIENT
Start: 2024-06-13 | End: 2024-09-11

## 2024-06-13 RX ORDER — INDOMETHACIN 50 MG/1
CAPSULE ORAL
Qty: 60 CAPSULE | Refills: 5 | Status: SHIPPED | OUTPATIENT
Start: 2024-06-13

## 2024-06-13 RX ORDER — ATORVASTATIN CALCIUM 80 MG/1
80 TABLET, FILM COATED ORAL DAILY
Qty: 90 TABLET | Refills: 0 | OUTPATIENT
Start: 2024-06-13

## 2024-06-13 RX ORDER — SILDENAFIL 100 MG/1
100 TABLET, FILM COATED ORAL AS NEEDED
Qty: 30 TABLET | Refills: 3 | Status: SHIPPED | OUTPATIENT
Start: 2024-06-13

## 2024-06-13 RX ORDER — ICOSAPENT ETHYL 1 G/1
1 CAPSULE ORAL 2 TIMES DAILY
Qty: 180 CAPSULE | Refills: 0 | OUTPATIENT
Start: 2024-06-13

## 2024-06-13 RX ORDER — LOSARTAN POTASSIUM AND HYDROCHLOROTHIAZIDE 25; 100 MG/1; MG/1
1 TABLET ORAL DAILY
Qty: 90 TABLET | Refills: 0 | OUTPATIENT
Start: 2024-06-13

## 2024-07-09 ENCOUNTER — RA CDI HCC (OUTPATIENT)
Dept: OTHER | Facility: HOSPITAL | Age: 49
End: 2024-07-09

## 2024-07-09 NOTE — PROGRESS NOTES
HCC coding opportunities          Chart Reviewed number of suggestions sent to Provider: 1     Patients Insurance        Commercial Insurance: [employer] Commercial Insurance     E11.65

## 2024-07-16 ENCOUNTER — OFFICE VISIT (OUTPATIENT)
Dept: FAMILY MEDICINE CLINIC | Facility: CLINIC | Age: 49
End: 2024-07-16
Payer: COMMERCIAL

## 2024-07-16 VITALS
TEMPERATURE: 97.3 F | HEART RATE: 75 BPM | HEIGHT: 74 IN | DIASTOLIC BLOOD PRESSURE: 89 MMHG | WEIGHT: 314 LBS | BODY MASS INDEX: 40.3 KG/M2 | OXYGEN SATURATION: 97 % | SYSTOLIC BLOOD PRESSURE: 142 MMHG

## 2024-07-16 DIAGNOSIS — E11.69 TYPE 2 DIABETES MELLITUS WITH OBESITY  (HCC): ICD-10-CM

## 2024-07-16 DIAGNOSIS — E11.59 HYPERTENSION COMPLICATING DIABETES  (HCC): ICD-10-CM

## 2024-07-16 DIAGNOSIS — I10 ESSENTIAL HYPERTENSION: ICD-10-CM

## 2024-07-16 DIAGNOSIS — E78.5 HYPERLIPIDEMIA ASSOCIATED WITH TYPE 2 DIABETES MELLITUS  (HCC): ICD-10-CM

## 2024-07-16 DIAGNOSIS — E11.69 HYPERLIPIDEMIA ASSOCIATED WITH TYPE 2 DIABETES MELLITUS  (HCC): ICD-10-CM

## 2024-07-16 DIAGNOSIS — E66.9 TYPE 2 DIABETES MELLITUS WITH OBESITY  (HCC): ICD-10-CM

## 2024-07-16 DIAGNOSIS — E11.9 TYPE 2 DIABETES MELLITUS WITHOUT COMPLICATION, WITHOUT LONG-TERM CURRENT USE OF INSULIN (HCC): Primary | ICD-10-CM

## 2024-07-16 DIAGNOSIS — I15.2 HYPERTENSION COMPLICATING DIABETES  (HCC): ICD-10-CM

## 2024-07-16 PROBLEM — E66.01 MORBID OBESITY WITH BMI OF 40.0-44.9, ADULT (HCC): Status: RESOLVED | Noted: 2019-05-22 | Resolved: 2024-07-16

## 2024-07-16 PROCEDURE — 99214 OFFICE O/P EST MOD 30 MIN: CPT | Performed by: PHYSICIAN ASSISTANT

## 2024-07-16 RX ORDER — AMLODIPINE BESYLATE 5 MG/1
10 TABLET ORAL DAILY
Start: 2024-07-16

## 2024-07-16 NOTE — PROGRESS NOTES
Ambulatory Visit  Name: Todd Causey      : 1975      MRN: [id number]  Encounter Provider: Heladio Todd PA-C  Encounter Date: 2024   Encounter department: [facility]    Assessment & Plan   1. Type 2 diabetes mellitus without complication, without long-term current use of insulin (HCC)  -     Albumin / creatinine urine ratio; Future; Expected date: 10/16/2024  -     Comprehensive metabolic panel; Future; Expected date: 10/16/2024  -     Hemoglobin A1C; Future; Expected date: 10/16/2024  -     CBC and differential; Future; Expected date: 10/16/2024  -     Lipid Panel with Direct LDL reflex; Future; Expected date: 10/16/2024  2. Hypertension complicating diabetes  (HCC)  3. Hyperlipidemia associated with type 2 diabetes mellitus  (HCC)  4. Type 2 diabetes mellitus with obesity  (HCC)  5. Essential hypertension  -     amLODIPine (NORVASC) 5 mg tablet; Take 2 tablets (10 mg total) by mouth daily     A1c improved, now controlled. Continue same regimen and low carb diet with routine CV exercise. Increase amlodipine to 10mg for further BP control. Continue statin, fibrate, omega 3. 3 month follow up with labs, earlier prn    History of Present Illness     Pt presents for routine follow up and annual physical. He has been lost to follow up over the past year     DM: a1c 7.4 from 8.4,, on jardiance, trulicity, metformin. Notes poor diet. +statin, arb  HLD: on high dose statin, fibrate, fish oil, triglycerides still high though improving  HTN: high, previously borderline, on hyzaar, amlodipine. no end organ complaints        Lab Results       Component                Value               Date                       HGBA1C                   7.4 (H)             2024            Lab Results       Component                Value               Date                       WBC                      4.99                2024                 HGB                      15.3                2024                  HCT                      46.0                06/11/2024                 MCV                      78 (L)              06/11/2024                 PLT                      184                 06/11/2024            Lab Results       Component                Value               Date                       SODIUM                   137                 06/11/2024                 K                        4.1                 06/11/2024                 CL                       100                 06/11/2024                 CO2                      24                  06/11/2024                 AGAP                     13                  06/11/2024                 BUN                      18                  06/11/2024                 CREATININE               0.90                06/11/2024                 GLUC                     133 (H)             09/26/2021                 GLUF                     180 (H)             06/11/2024                 CALCIUM                  9.7                 06/11/2024                 AST                      28                  06/11/2024                 ALT                      42                  06/11/2024                 ALKPHOS                  51                  06/11/2024                         TP                       7.0                 06/11/2024                        TBILI                    1.25 (H)            06/11/2024                 EGFR                     98                  06/11/2024       Lab Results       Component                Value               Date                       CHOLESTEROL              122                 06/11/2024                 CHOLESTEROL              150                 01/30/2024                 CHOLESTEROL              156                 01/10/2023            Lab Results       Component                Value               Date                       HDL                      26 (L)              06/11/2024                 HDL                       27 (L)              01/30/2024                 HDL                      27 (L)              01/10/2023            Lab Results       Component                Value               Date                       TRIG                     428 (H)             06/11/2024                 TRIG                     603 (H)             01/30/2024                 TRIG                     561 (H)             01/10/2023                   Lab Results       Component                Value               Date                       LDLCALC                                      06/11/2024              Comment:    Calculated LDL invalid, triglycerides >400 mg/dl                     Review of Systems   Constitutional:  Negative for chills, fatigue and fever.   HENT:  Negative for congestion, ear pain, hearing loss, nosebleeds, postnasal drip, rhinorrhea, sinus pressure, sinus pain, sneezing and sore throat.    Eyes:  Negative for pain, discharge, itching and visual disturbance.   Respiratory:  Negative for cough, chest tightness, shortness of breath and wheezing.    Cardiovascular:  Negative for chest pain, palpitations and leg swelling.   Gastrointestinal:  Negative for abdominal pain, blood in stool, constipation, diarrhea, nausea and vomiting.   Genitourinary:  Negative for frequency and urgency.   Neurological:  Negative for dizziness, light-headedness and numbness.     Past Medical History:   Diagnosis Date    CPAP (continuous positive airway pressure) dependence     Diabetes (HCC)     Diabetes mellitus (HCC)     Gout     HTN (hypertension)     Hyperlipidemia     Kidney stone     RIGHT    Morbid obesity with BMI of 40.0-44.9, adult (HCC)     SANCHO on CPAP      Past Surgical History:   Procedure Laterality Date    GANGLION CYST EXCISION Left 2/19/2019    Procedure: WRIST GANGLION CYST EXCISION;  Surgeon: Kana Hanson MD;  Location: [facility];  Service: Orthopedics    NC CYSTO/URETERO W/LITHOTRIPSY &INDWELL STENT INSRT Right  5/1/2017    Procedure: CYSTOSCOPY URETEROSCOPY WITH LITHOTRIPSY HOLMIUM LASER, RETROGRADE PYELOGRAM AND INSERTION STENT URETERAL;  Surgeon: Zoltan Whitney MD;  [facility];  Service: Urology    AK LITHOTRIPSY XTRCORP SHOCK WAVE Left 6/19/2017    Procedure: LITHROTRIPSY EXTRACORPORAL SHOCKWAVE (ESWL);  Surgeon: Zoltan Whitney MD;  [facility];  Service: Urology    AK NDSC WRST SURG W/RLS TRANSVRS CARPL LIGM Left 2/19/2019    Procedure: ENDOSCOPIC CARPAL TUNNEL RELEASE;  Surgeon: Kana Hanson MD;  Location: [facility];  Service: Orthopedics     Family History   Problem Relation Age of Onset    Bone cancer Father     Prostate cancer Maternal Grandfather     Cancer Family     Hypertension Family      Social History     Tobacco Use    Smoking status: Former     Current packs/day: 0.00     Types: Cigarettes     Quit date: 2009     Years since quitting: 15.5    Smokeless tobacco: Former     Types: Chew    Tobacco comments:     Former Smoker as per allscripts   Substance and Sexual Activity    Alcohol use: Yes     Alcohol/week: 1.0 standard drink of alcohol     Types: 1 Cans of beer per week     Comment: socially    Drug use: No    Sexual activity: Not on file     Current Outpatient Medications on File Prior to Visit   Medication Sig    allopurinol (ZYLOPRIM) 300 mg tablet Take 1 tablet (300 mg total) by mouth daily    atorvastatin (LIPITOR) 80 mg tablet TAKE 1 TABLET DAILY    Blood Glucose Monitoring Suppl (ONE TOUCH ULTRA 2) w/Device KIT 1 kit by Does not apply route daily    Choline Fenofibrate (Fenofibric Acid) 135 MG CPDR Take 1 capsule (135 mg total) by mouth daily    dulaglutide (Trulicity) 3 MG/0.5ML injection Inject 0.5 mL (3 mg total) under the skin every 7 days    Empagliflozin (Jardiance) 10 MG TABS tablet Take 1 tablet (10 mg total) by mouth daily    glucose blood test strip Use 1 each daily    ibuprofen (MOTRIN) 600 mg tablet Take 1 tablet (600 mg total) by mouth every 8 (eight) hours     "Icosapent Ethyl 1 g CAPS Take 1 capsule (1 g total) by mouth 2 (two) times a day    indomethacin (INDOCIN) 50 mg capsule Take 1 capsule twice a day as needed for mild pain    losartan-hydrochlorothiazide (HYZAAR) 100-25 MG per tablet TAKE 1 TABLET DAILY    metFORMIN (GLUCOPHAGE-XR) 500 mg 24 hr tablet Take 2 tablets (1,000 mg total) by mouth 2 (two) times a day with meals    methocarbamol (ROBAXIN) 750 mg tablet TAKE 1 TABLET EVERY 6 HOURS AS NEEDED FOR MUSCLE SPASMS    ONETOUCH DELICA LANCETS 33G MISC 1 Units by Does not apply route daily    sildenafil (VIAGRA) 100 mg tablet Take 1 tablet (100 mg total) by mouth as needed for erectile dysfunction    tamsulosin (FLOMAX) 0.4 mg Take 1 capsule (0.4 mg total) by mouth daily with dinner    [DISCONTINUED] amLODIPine (NORVASC) 5 mg tablet Take 1 tablet (5 mg total) by mouth daily     No Known Allergies  Immunization History   Administered Date(s) Administered    COVID-19 PFIZER VACCINE 0.3 ML IM 04/05/2021, 04/26/2021, 11/23/2021    COVID-19 Pfizer Vac BIVALENT Dwight-sucrose 12 Yr+ IM 12/07/2022    INFLUENZA 09/14/2021, 10/07/2022, 09/12/2023    Influenza Injectable, MDCK, Preservative Free, Quadrivalent, 0.5 mL 10/24/2018, 10/16/2019    Influenza, recombinant, quadrivalent,injectable, preservative free 10/06/2020     Objective     /89   Pulse 75   Temp (!) 97.3 °F (36.3 °C)   Ht 6' 2\" (1.88 m)   Wt (!) 142 kg (314 lb)   SpO2 97%   BMI 40.32 kg/m²     Physical Exam  Vitals and nursing note reviewed.   Constitutional:       General: He is not in acute distress.     Appearance: He is well-developed.   HENT:      Head: Normocephalic and atraumatic.   Eyes:      Conjunctiva/sclera: Conjunctivae normal.   Cardiovascular:      Rate and Rhythm: Normal rate and regular rhythm.      Pulses: no weak pulses.           Dorsalis pedis pulses are 2+ on the right side and 2+ on the left side.        Posterior tibial pulses are 2+ on the right side and 2+ on the left side.     "  Heart sounds: No murmur heard.  Pulmonary:      Effort: Pulmonary effort is normal. No respiratory distress.      Breath sounds: Normal breath sounds. No wheezing, rhonchi or rales.   Musculoskeletal:         General: No swelling.      Cervical back: Neck supple.   Feet:      Right foot:      Skin integrity: No ulcer, skin breakdown, erythema, warmth, callus or dry skin.      Left foot:      Skin integrity: No ulcer, skin breakdown, erythema, warmth, callus or dry skin.   Skin:     General: Skin is warm and dry.      Capillary Refill: Capillary refill takes less than 2 seconds.   Neurological:      Mental Status: He is alert.   Psychiatric:         Mood and Affect: Mood normal.         Diabetic Foot Exam    Patient's shoes and socks removed.    Right Foot/Ankle   Right Foot Inspection  Skin Exam: skin normal and skin intact. No dry skin, no warmth, no callus, no erythema, no maceration, no abnormal color, no pre-ulcer, no ulcer and no callus.     Toe Exam: ROM and strength within normal limits.     Sensory   Vibration: intact  Proprioception: intact  Monofilament testing: intact    Vascular  Capillary refills: < 3 seconds  The right DP pulse is 2+. The right PT pulse is 2+.     Left Foot/Ankle  Left Foot Inspection  Skin Exam: skin normal and skin intact. No dry skin, no warmth, no erythema, no maceration, normal color, no pre-ulcer, no ulcer and no callus.     Toe Exam: ROM and strength within normal limits.     Sensory   Vibration: intact  Proprioception: intact  Monofilament testing: intact    Vascular  Capillary refills: < 3 seconds  The left DP pulse is 2+. The left PT pulse is 2+.     Assign Risk Category  No deformity present  No loss of protective sensation  No weak pulses  Risk: 0

## 2024-07-23 ENCOUNTER — HOSPITAL ENCOUNTER (OUTPATIENT)
Dept: ULTRASOUND IMAGING | Facility: HOSPITAL | Age: 49
Discharge: HOME | End: 2024-07-23
Payer: COMMERCIAL

## 2024-07-23 DIAGNOSIS — N20.0 KIDNEY STONES: ICD-10-CM

## 2024-07-23 PROCEDURE — 76775 US EXAM ABDO BACK WALL LIM: CPT

## 2024-08-06 ENCOUNTER — OFFICE VISIT (OUTPATIENT)
Dept: OBGYN CLINIC | Facility: CLINIC | Age: 49
End: 2024-08-06
Payer: COMMERCIAL

## 2024-08-06 VITALS
DIASTOLIC BLOOD PRESSURE: 89 MMHG | HEART RATE: 92 BPM | BODY MASS INDEX: 40.3 KG/M2 | HEIGHT: 74 IN | SYSTOLIC BLOOD PRESSURE: 158 MMHG | WEIGHT: 314 LBS

## 2024-08-06 DIAGNOSIS — M67.40 GANGLION CYST: Primary | ICD-10-CM

## 2024-08-06 PROCEDURE — 20612 ASPIRATE/INJ GANGLION CYST: CPT | Performed by: ORTHOPAEDIC SURGERY

## 2024-08-06 PROCEDURE — 99213 OFFICE O/P EST LOW 20 MIN: CPT | Performed by: ORTHOPAEDIC SURGERY

## 2024-08-06 NOTE — PROGRESS NOTES
Assessment/Plan:   Diagnoses and all orders for this visit:    Ganglion cyst  -     Hand/upper extremity injection         The patient has a recurrent ganglion cyst over the IP joint of his right thumb. He was offered and accepted an aspiration of the ganglion cyst of his Right thumb for symptomatic relief. 1cc of gelatinous fluid aspirated. He tolerated the procedure well. Post injection protocol advised. Recommended that he use a moist warm compress to decompress the cyst. Weightbearing activities as tolerated. Consideration for MRI if cyst returns. He will follow-up in 8 weeks for re-evaluation. The patient expresses understanding and is in agreement with today's treatment plan.     The patient has a ganglion cyst over the IP joint of his thumb.  An aspiration occurred today with minimal extravasation of gelatinous tissue.  The patient did connor it 3 weeks ago with a hot razor.  Would recommend continue to monitor.  May need to get an MRI to see any extension from the cyst with a consideration of surgical excision.  Return back 2 months for reevaluation      Subjective:   Patient ID: Todd Causey  1975     HPI  Patient is a 49 y.o. male who presents for evaluation of his right thumb. The patient has a history of a ganglion cyst over the IP joint of his right thumb. He last had the cyst aspirated on 4/5/2022. The patient states that the cyst recently returned. He states that the cyst grew in size and was impacting his range of motion, therefore, he heated a razor and attempted an aspiration on his own. He denies symptoms of an infection. He denies numbness or tingling. He also reports left hand pain with a history of osteoarthritis. .    The following portions of the patient's history were reviewed and updated as appropriate:  Past medical history, past surgical history, Family history, social history, current medications and allergies    Past Medical History:   Diagnosis Date    CPAP (continuous positive airway  pressure) dependence     Diabetes (HCC)     Diabetes mellitus (HCC)     Gout     HTN (hypertension)     Hyperlipidemia     Kidney stone     RIGHT    Morbid obesity with BMI of 40.0-44.9, adult (HCC)     SANCHO on CPAP        Past Surgical History:   Procedure Laterality Date    GANGLION CYST EXCISION Left 2/19/2019    Procedure: WRIST GANGLION CYST EXCISION;  Surgeon: Kana Hanson MD;  [facility];  Service: Orthopedics    IN CYSTO/URETERO W/LITHOTRIPSY &INDWELL STENT INSRT Right 5/1/2017    Procedure: CYSTOSCOPY URETEROSCOPY WITH LITHOTRIPSY HOLMIUM LASER, RETROGRADE PYELOGRAM AND INSERTION STENT URETERAL;  Surgeon: Zoltan Whitney MD;  [facility];  Service: Urology    IN LITHOTRIPSY XTRCORP SHOCK WAVE Left 6/19/2017    Procedure: LITHROTRIPSY EXTRACORPORAL SHOCKWAVE (ESWL);  Surgeon: Zoltan Whitney MD;  [facility];  Service: Urology    IN NDSC WRST SURG W/RLS TRANSVRS CARPL LIGM Left 2/19/2019    Procedure: ENDOSCOPIC CARPAL TUNNEL RELEASE;  Surgeon: Kana Hanson MD;  [facility];  Service: Orthopedics       Family History   Problem Relation Age of Onset    Bone cancer Father     Prostate cancer Maternal Grandfather     Cancer Family     Hypertension Family        Social History     Socioeconomic History    Marital status: /Civil Union     Spouse name: None    Number of children: None    Years of education: None    Highest education level: None   Occupational History    None   Tobacco Use    Smoking status: Former     Current packs/day: 0.00     Types: Cigarettes     Quit date: 2009     Years since quitting: 15.6    Smokeless tobacco: Former     Types: Chew    Tobacco comments:     Former Smoker as per allscripts   Substance and Sexual Activity    Alcohol use: Yes     Alcohol/week: 1.0 standard drink of alcohol     Types: 1 Cans of beer per week     Comment: socially    Drug use: No    Sexual activity: None   Other Topics Concern    None   Social History Narrative     Daily Coffee Consumption    Daily Tea Consumption     Social Determinants of Health     Financial Resource Strain: Not on file   Food Insecurity: Not on file   Transportation Needs: Not on file   Physical Activity: Not on file   Stress: Not on file (2/11/2021)   Social Connections: Not on file   Intimate Partner Violence: Low Risk  (4/13/2021)    [facility]    Intimate Partner Violence     Insults You: Not on file     Threatens You: Not on file     Screams at You: Not on file     Physically Hurt: Not on file     Intimate Partner Violence Score: Not on file   Housing Stability: Not on file         Current Outpatient Medications:     allopurinol (ZYLOPRIM) 300 mg tablet, Take 1 tablet (300 mg total) by mouth daily, Disp: 90 tablet, Rfl: 0    amLODIPine (NORVASC) 5 mg tablet, Take 2 tablets (10 mg total) by mouth daily, Disp: , Rfl:     atorvastatin (LIPITOR) 80 mg tablet, TAKE 1 TABLET DAILY, Disp: 90 tablet, Rfl: 1    Blood Glucose Monitoring Suppl (ONE TOUCH ULTRA 2) w/Device KIT, 1 kit by Does not apply route daily, Disp: , Rfl:     Choline Fenofibrate (Fenofibric Acid) 135 MG CPDR, Take 1 capsule (135 mg total) by mouth daily, Disp: 90 capsule, Rfl: 0    dulaglutide (Trulicity) 3 MG/0.5ML injection, Inject 0.5 mL (3 mg total) under the skin every 7 days, Disp: 6 mL, Rfl: 1    Empagliflozin (Jardiance) 10 MG TABS tablet, Take 1 tablet (10 mg total) by mouth daily, Disp: 90 tablet, Rfl: 0    glucose blood test strip, Use 1 each daily, Disp: 100 each, Rfl: 5    ibuprofen (MOTRIN) 600 mg tablet, Take 1 tablet (600 mg total) by mouth every 8 (eight) hours, Disp: 90 tablet, Rfl: 5    Icosapent Ethyl 1 g CAPS, Take 1 capsule (1 g total) by mouth 2 (two) times a day, Disp: 180 capsule, Rfl: 0    indomethacin (INDOCIN) 50 mg capsule, Take 1 capsule twice a day as needed for mild pain, Disp: 60 capsule, Rfl: 5    losartan-hydrochlorothiazide (HYZAAR) 100-25 MG per tablet, TAKE 1 TABLET  "DAILY, Disp: 90 tablet, Rfl: 1    metFORMIN (GLUCOPHAGE-XR) 500 mg 24 hr tablet, Take 2 tablets (1,000 mg total) by mouth 2 (two) times a day with meals, Disp: 360 tablet, Rfl: 0    methocarbamol (ROBAXIN) 750 mg tablet, TAKE 1 TABLET EVERY 6 HOURS AS NEEDED FOR MUSCLE SPASMS, Disp: 30 tablet, Rfl: 1    ONETOUCH DELICA LANCETS 33G MISC, 1 Units by Does not apply route daily, Disp: , Rfl:     sildenafil (VIAGRA) 100 mg tablet, Take 1 tablet (100 mg total) by mouth as needed for erectile dysfunction, Disp: 30 tablet, Rfl: 3    tamsulosin (FLOMAX) 0.4 mg, Take 1 capsule (0.4 mg total) by mouth daily with dinner, Disp: 90 capsule, Rfl: 0    No Known Allergies    Review of Systems   Constitutional:  Negative for chills and fever.   HENT:  Negative for ear pain and sore throat.    Eyes:  Negative for pain and visual disturbance.   Respiratory:  Negative for cough and shortness of breath.    Cardiovascular:  Negative for chest pain and palpitations.   Gastrointestinal:  Negative for abdominal pain and vomiting.   Genitourinary:  Negative for dysuria and hematuria.   Musculoskeletal:  Negative for arthralgias and back pain.   Skin:  Negative for color change and rash.   Neurological:  Negative for seizures and syncope.   All other systems reviewed and are negative.       Objective:  /89 (BP Location: Left arm, Patient Position: Sitting, Cuff Size: Large)   Pulse 92   Ht 6' 2\" (1.88 m)   Wt (!) 142 kg (314 lb) Comment: reported  BMI 40.32 kg/m²     Ortho Exam  right Hand -    Patient presents with ganglion cyst over IP joint of right thumb   Skin is warm and dry to touch with no signs of erythema, ecchymosis, or infection  No soft tissue swelling or effusion noted  Full FDS, FDP, extensor mechanisms are intact  Demonstrates normal wrist, elbow, and shoulder motion  Forearm compartments are soft and supple  2+ distal radial pulse with brisk capillary refill to the fingers  Radial, median, and ulnar motor and sensory " distribution intact  Sensations light to touch intact distally      Physical Exam  HENT:      Head: Normocephalic and atraumatic.      Nose: Nose normal.   Eyes:      Conjunctiva/sclera: Conjunctivae normal.   Cardiovascular:      Rate and Rhythm: Normal rate.   Pulmonary:      Effort: Pulmonary effort is normal.   Musculoskeletal:      Cervical back: Neck supple.   Skin:     General: Skin is warm and dry.      Capillary Refill: Capillary refill takes less than 2 seconds.   Neurological:      Mental Status: He is alert and oriented to person, place, and time.   Psychiatric:         Mood and Affect: Mood normal.         Behavior: Behavior normal.          Diagnostic Test Review:  No new imaging at time of visit.     Hand/upper extremity injection  Universal Protocol:  Consent: Verbal consent obtained.  Risks and benefits: risks, benefits and alternatives were discussed  Consent given by: patient  Timeout called at: 8/6/2024 9:02 AM.  Patient understanding: patient states understanding of the procedure being performed  Patient consent: the patient's understanding of the procedure matches consent given  Site marked: the operative site was marked  Patient identity confirmed: verbally with patient  Supporting Documentation  Indications: joint swelling and pain   Procedure Details  Condition comment: ganglion cyst of right thumb   Preparation: Patient was prepped and draped in the usual sterile fashion  Needle size: 25 G  Ultrasound guidance: no  Aspirate amount: 1 (gelatinous) mL  Patient tolerance: patient tolerated the procedure well with no immediate complications  Dressing:  Sterile dressing applied            Scribe Attestation      I,:  Ashley Paz am acting as a scribe while in the presence of the attending physician.:       I,:  Everardo Ugalde DO personally performed the services described in this documentation    as scribed in my presence.:

## 2024-08-20 DIAGNOSIS — R52 PAIN: ICD-10-CM

## 2024-08-20 RX ORDER — IBUPROFEN 600 MG/1
600 TABLET, FILM COATED ORAL EVERY 8 HOURS
Qty: 300 TABLET | Refills: 1 | Status: SHIPPED | OUTPATIENT
Start: 2024-08-20

## 2024-08-20 NOTE — TELEPHONE ENCOUNTER
Reason for call:   [x] Refill   [] Prior Auth  [x] Other: mail order pharmacy - 100 day supply     Office:   [x] PCP/Provider -  Donita Elizabeth DO   [] Specialty/Provider -     Medication: ibuprofen (MOTRIN) 600 mg tablet     Dose/Frequency: Take 1 tablet (600 mg total) by mouth every 8 (eight) hours     Quantity: 300    Pharmacy:   [facility] - [address]        Does the patient have enough for 3 days?   [] Yes   [x] No - Send as HP to POD

## 2024-08-22 DIAGNOSIS — E78.2 MIXED HYPERLIPIDEMIA: ICD-10-CM

## 2024-08-22 RX ORDER — ICOSAPENT ETHYL 1 G/1
1 CAPSULE ORAL 2 TIMES DAILY
Qty: 180 CAPSULE | Refills: 3 | Status: SHIPPED | OUTPATIENT
Start: 2024-08-22

## 2024-10-01 ENCOUNTER — OFFICE VISIT (OUTPATIENT)
Dept: OBGYN CLINIC | Facility: CLINIC | Age: 49
End: 2024-10-01
Payer: COMMERCIAL

## 2024-10-01 VITALS
DIASTOLIC BLOOD PRESSURE: 81 MMHG | WEIGHT: 314 LBS | HEART RATE: 85 BPM | SYSTOLIC BLOOD PRESSURE: 147 MMHG | HEIGHT: 74 IN | BODY MASS INDEX: 40.3 KG/M2

## 2024-10-01 DIAGNOSIS — M67.40 GANGLION CYST: Primary | ICD-10-CM

## 2024-10-01 PROCEDURE — 99213 OFFICE O/P EST LOW 20 MIN: CPT | Performed by: ORTHOPAEDIC SURGERY

## 2024-10-01 NOTE — PROGRESS NOTES
Assessment/Plan:   Diagnoses and all orders for this visit:    Ganglion cyst  -     MRI thumb right wo and w contrast; Future         The patient continues to experience recurrence of a ganglion cyst over the IP joint of his right thumb. The patient states that he drained the cyst multiple times on his own, however, the cyst continues to return. An MRI w wo contrast of his right thumb was ordered for further evaluation. He will follow-up once the MRI is complete. The patient expresses understanding and is in agreement with today's treatment plan.     The patient has recurrent cyst along his right thumb.  Would recommend obtaining an MRI.  Return back once MRI is complete      Subjective:   Patient ID: Todd Causey  1975     HPI  Patient is a 49 y.o. male who presents for follow-up evaluation of his right thumb. The patient has a history of a ganglion cyst over the IP joint of his right thumb. He last had the cyst aspirated on 8/6/2024. The patient states that the cyst continues to return. He states that he attempted multiple aspirations on his own. He denies symptoms of an infection. He states that the size of the cyst has impacted his ability to grasp items. He denies numbness or tingling. He also reports left hand pain with a history of osteoarthritis.     The following portions of the patient's history were reviewed and updated as appropriate:  Past medical history, past surgical history, Family history, social history, current medications and allergies    Past Medical History:   Diagnosis Date    CPAP (continuous positive airway pressure) dependence     Diabetes (HCC)     Diabetes mellitus (HCC)     Gout     HTN (hypertension)     Hyperlipidemia     Kidney stone     RIGHT    Morbid obesity with BMI of 40.0-44.9, adult (HCC)     SANCHO on CPAP        Past Surgical History:   Procedure Laterality Date    GANGLION CYST EXCISION Left 2/19/2019    Procedure: WRIST GANGLION CYST EXCISION;  Surgeon: Kana Hanson MD;   [facility];  Service: Orthopedics    CA CYSTO/URETERO W/LITHOTRIPSY &INDWELL STENT INSRT Right 5/1/2017    Procedure: CYSTOSCOPY URETEROSCOPY WITH LITHOTRIPSY HOLMIUM LASER, RETROGRADE PYELOGRAM AND INSERTION STENT URETERAL;  Surgeon: Zoltan Whitney MD;  [facility];  Service: Urology    CA LITHOTRIPSY XTRCORP SHOCK WAVE Left 6/19/2017    Procedure: LITHROTRIPSY EXTRACORPORAL SHOCKWAVE (ESWL);  Surgeon: Zoltan Whitney MD;  [facility];  Service: Urology    CA NDSC WRST SURG W/RLS TRANSVRS CARPL LIGM Left 2/19/2019    Procedure: ENDOSCOPIC CARPAL TUNNEL RELEASE;  Surgeon: Kana Hanson MD;  [facility];  Service: Orthopedics       Family History   Problem Relation Age of Onset    Bone cancer Father     Prostate cancer Maternal Grandfather     Cancer Family     Hypertension Family        Social History     Socioeconomic History    Marital status: /Civil Union     Spouse name: None    Number of children: None    Years of education: None    Highest education level: None   Occupational History    None   Tobacco Use    Smoking status: Former     Current packs/day: 0.00     Types: Cigarettes     Quit date: 2009     Years since quitting: 15.7    Smokeless tobacco: Former     Types: Chew    Tobacco comments:     Former Smoker as per allscripts   Vaping Use    Vaping status: Never Used   Substance and Sexual Activity    Alcohol use: Yes     Alcohol/week: 1.0 standard drink of alcohol     Types: 1 Cans of beer per week     Comment: socially    Drug use: No    Sexual activity: None   Other Topics Concern    None   Social History Narrative    Daily Coffee Consumption    Daily Tea Consumption     Social Determinants of Health     Financial Resource Strain: Not on file   Food Insecurity: Not on file   Transportation Needs: Not on file   Physical Activity: Not on file   Stress: Not on file (2/11/2021)   Social Connections: Not on file   Intimate Partner Violence: Low Risk  (4/13/2021)     [facility]    Intimate Partner Violence     Insults You: Not on file     Threatens You: Not on file     Screams at You: Not on file     Physically Hurt: Not on file     Intimate Partner Violence Score: Not on file   Housing Stability: Not on file         Current Outpatient Medications:     allopurinol (ZYLOPRIM) 300 mg tablet, Take 1 tablet (300 mg total) by mouth daily, Disp: 90 tablet, Rfl: 0    amLODIPine (NORVASC) 5 mg tablet, Take 2 tablets (10 mg total) by mouth daily, Disp: , Rfl:     atorvastatin (LIPITOR) 80 mg tablet, TAKE 1 TABLET DAILY, Disp: 90 tablet, Rfl: 1    Blood Glucose Monitoring Suppl (ONE TOUCH ULTRA 2) w/Device KIT, 1 kit by Does not apply route daily, Disp: , Rfl:     Choline Fenofibrate (Fenofibric Acid) 135 MG CPDR, Take 1 capsule (135 mg total) by mouth daily, Disp: 90 capsule, Rfl: 0    Empagliflozin (Jardiance) 10 MG TABS tablet, Take 1 tablet (10 mg total) by mouth daily, Disp: 90 tablet, Rfl: 0    glucose blood test strip, Use 1 each daily, Disp: 100 each, Rfl: 5    ibuprofen (MOTRIN) 600 mg tablet, Take 1 tablet (600 mg total) by mouth every 8 (eight) hours, Disp: 300 tablet, Rfl: 1    Icosapent Ethyl 1 g CAPS, TAKE 1 CAPSULE TWICE A DAY, Disp: 180 capsule, Rfl: 3    indomethacin (INDOCIN) 50 mg capsule, Take 1 capsule twice a day as needed for mild pain, Disp: 60 capsule, Rfl: 5    losartan-hydrochlorothiazide (HYZAAR) 100-25 MG per tablet, TAKE 1 TABLET DAILY, Disp: 90 tablet, Rfl: 1    methocarbamol (ROBAXIN) 750 mg tablet, TAKE 1 TABLET EVERY 6 HOURS AS NEEDED FOR MUSCLE SPASMS, Disp: 30 tablet, Rfl: 1    ONETOUCH DELICA LANCETS 33G MISC, 1 Units by Does not apply route daily, Disp: , Rfl:     sildenafil (VIAGRA) 100 mg tablet, Take 1 tablet (100 mg total) by mouth as needed for erectile dysfunction, Disp: 30 tablet, Rfl: 3    tamsulosin (FLOMAX) 0.4 mg, Take 1 capsule (0.4 mg total) by mouth daily with dinner, Disp: 90 capsule, Rfl: 0     "dulaglutide (Trulicity) 3 MG/0.5ML injection, Inject 0.5 mL (3 mg total) under the skin every 7 days, Disp: 6 mL, Rfl: 1    metFORMIN (GLUCOPHAGE-XR) 500 mg 24 hr tablet, Take 2 tablets (1,000 mg total) by mouth 2 (two) times a day with meals, Disp: 360 tablet, Rfl: 0    No Known Allergies    Review of Systems   Constitutional:  Negative for chills and fever.   HENT:  Negative for ear pain and sore throat.    Eyes:  Negative for pain and visual disturbance.   Respiratory:  Negative for cough and shortness of breath.    Cardiovascular:  Negative for chest pain and palpitations.   Gastrointestinal:  Negative for abdominal pain and vomiting.   Genitourinary:  Negative for dysuria and hematuria.   Musculoskeletal:  Negative for arthralgias and back pain.   Skin:  Negative for color change and rash.   Neurological:  Negative for seizures and syncope.   All other systems reviewed and are negative.       Objective:  /81   Pulse 85   Ht 6' 2\" (1.88 m)   Wt (!) 142 kg (314 lb)   BMI 40.32 kg/m²     Ortho Exam  right Hand -    Patient presents with ganglion cyst over IP joint of right thumb   Skin is warm and dry to touch with no signs of erythema, ecchymosis, or infection  No soft tissue swelling or effusion noted  Full FDS, FDP, extensor mechanisms are intact  Demonstrates normal wrist, elbow, and shoulder motion  Forearm compartments are soft and supple  2+ distal radial pulse with brisk capillary refill to the fingers  Radial, median, and ulnar motor and sensory distribution intact  Sensations light to touch intact distally      Physical Exam  HENT:      Head: Normocephalic and atraumatic.      Nose: Nose normal.   Eyes:      Conjunctiva/sclera: Conjunctivae normal.   Cardiovascular:      Rate and Rhythm: Normal rate.   Pulmonary:      Effort: Pulmonary effort is normal.   Musculoskeletal:      Cervical back: Neck supple.   Skin:     General: Skin is warm and dry.      Capillary Refill: Capillary refill takes " less than 2 seconds.   Neurological:      Mental Status: He is alert and oriented to person, place, and time.   Psychiatric:         Mood and Affect: Mood normal.         Behavior: Behavior normal.          Diagnostic Test Review:  No new imaging at time of visit.       Scribe Attestation      I,:  Ashley Paz am acting as a scribe while in the presence of the attending physician.:       I,:  Everardo Ugalde, DO personally performed the services described in this documentation    as scribed in my presence.:

## 2024-10-14 DIAGNOSIS — N13.8 BPH WITH OBSTRUCTION/LOWER URINARY TRACT SYMPTOMS: ICD-10-CM

## 2024-10-14 DIAGNOSIS — N40.1 BPH WITH OBSTRUCTION/LOWER URINARY TRACT SYMPTOMS: ICD-10-CM

## 2024-10-15 RX ORDER — TAMSULOSIN HYDROCHLORIDE 0.4 MG/1
CAPSULE ORAL
Qty: 90 CAPSULE | Refills: 1 | Status: SHIPPED | OUTPATIENT
Start: 2024-10-15

## 2024-10-23 DIAGNOSIS — M67.40 GANGLION CYST: Primary | ICD-10-CM

## 2024-10-29 ENCOUNTER — APPOINTMENT (OUTPATIENT)
Dept: LAB | Facility: HOSPITAL | Age: 49
End: 2024-10-29
Payer: COMMERCIAL

## 2024-10-29 ENCOUNTER — HOSPITAL ENCOUNTER (OUTPATIENT)
Dept: MRI IMAGING | Facility: HOSPITAL | Age: 49
Discharge: HOME | End: 2024-10-29
Attending: ORTHOPAEDIC SURGERY
Payer: COMMERCIAL

## 2024-10-29 DIAGNOSIS — M67.40 GANGLION CYST: ICD-10-CM

## 2024-10-29 DIAGNOSIS — E11.9 TYPE 2 DIABETES MELLITUS WITHOUT COMPLICATION, WITHOUT LONG-TERM CURRENT USE OF INSULIN (HCC): ICD-10-CM

## 2024-10-29 LAB
ALBUMIN SERPL BCG-MCNC: 4.3 G/DL (ref 3.5–5)
ALP SERPL-CCNC: 62 U/L (ref 34–104)
ALT SERPL W P-5'-P-CCNC: 31 U/L (ref 7–52)
ANION GAP SERPL CALCULATED.3IONS-SCNC: 6 MMOL/L (ref 4–13)
AST SERPL W P-5'-P-CCNC: 16 U/L (ref 13–39)
BASOPHILS # BLD AUTO: 0.03 THOUSANDS/ΜL (ref 0–0.1)
BASOPHILS NFR BLD AUTO: 1 % (ref 0–1)
BILIRUB SERPL-MCNC: 0.78 MG/DL (ref 0.2–1)
BUN SERPL-MCNC: 17 MG/DL (ref 5–25)
CALCIUM SERPL-MCNC: 9.1 MG/DL (ref 8.4–10.2)
CHLORIDE SERPL-SCNC: 102 MMOL/L (ref 96–108)
CHOLEST SERPL-MCNC: 132 MG/DL
CO2 SERPL-SCNC: 28 MMOL/L (ref 21–32)
CREAT SERPL-MCNC: 0.92 MG/DL (ref 0.6–1.3)
CREAT UR-MCNC: 166.9 MG/DL
EOSINOPHIL # BLD AUTO: 0.21 THOUSAND/ΜL (ref 0–0.61)
EOSINOPHIL NFR BLD AUTO: 4 % (ref 0–6)
ERYTHROCYTE [DISTWIDTH] IN BLOOD BY AUTOMATED COUNT: 14.9 % (ref 11.6–15.1)
EST. AVERAGE GLUCOSE BLD GHB EST-MCNC: 166 MG/DL
GFR SERPL CREATININE-BSD FRML MDRD: 95 ML/MIN/1.73SQ M
GLUCOSE P FAST SERPL-MCNC: 225 MG/DL (ref 65–99)
HBA1C MFR BLD: 7.4 %
HCT VFR BLD AUTO: 39.5 % (ref 36.5–49.3)
HDLC SERPL-MCNC: 25 MG/DL
HGB BLD-MCNC: 12.8 G/DL (ref 12–17)
IMM GRANULOCYTES # BLD AUTO: 0.02 THOUSAND/UL (ref 0–0.2)
IMM GRANULOCYTES NFR BLD AUTO: 0 % (ref 0–2)
LDLC SERPL DIRECT ASSAY-MCNC: 59 MG/DL (ref 0–100)
LYMPHOCYTES # BLD AUTO: 1.86 THOUSANDS/ΜL (ref 0.6–4.47)
LYMPHOCYTES NFR BLD AUTO: 34 % (ref 14–44)
MCH RBC QN AUTO: 26 PG (ref 26.8–34.3)
MCHC RBC AUTO-ENTMCNC: 32.4 G/DL (ref 31.4–37.4)
MCV RBC AUTO: 80 FL (ref 82–98)
MICROALBUMIN UR-MCNC: 64.9 MG/L
MICROALBUMIN/CREAT 24H UR: 39 MG/G CREATININE (ref 0–30)
MONOCYTES # BLD AUTO: 0.41 THOUSAND/ΜL (ref 0.17–1.22)
MONOCYTES NFR BLD AUTO: 8 % (ref 4–12)
NEUTROPHILS # BLD AUTO: 2.88 THOUSANDS/ΜL (ref 1.85–7.62)
NEUTS SEG NFR BLD AUTO: 53 % (ref 43–75)
NRBC BLD AUTO-RTO: 0 /100 WBCS
PLATELET # BLD AUTO: 171 THOUSANDS/UL (ref 149–390)
PMV BLD AUTO: 10.4 FL (ref 8.9–12.7)
POTASSIUM SERPL-SCNC: 3.9 MMOL/L (ref 3.5–5.3)
PROT SERPL-MCNC: 6.8 G/DL (ref 6.4–8.4)
RBC # BLD AUTO: 4.93 MILLION/UL (ref 3.88–5.62)
SODIUM SERPL-SCNC: 136 MMOL/L (ref 135–147)
TRIGL SERPL-MCNC: 550 MG/DL
WBC # BLD AUTO: 5.41 THOUSAND/UL (ref 4.31–10.16)

## 2024-10-29 PROCEDURE — 80053 COMPREHEN METABOLIC PANEL: CPT

## 2024-10-29 PROCEDURE — 83721 ASSAY OF BLOOD LIPOPROTEIN: CPT

## 2024-10-29 PROCEDURE — 85025 COMPLETE CBC W/AUTO DIFF WBC: CPT

## 2024-10-29 PROCEDURE — 36415 COLL VENOUS BLD VENIPUNCTURE: CPT

## 2024-10-29 PROCEDURE — 80061 LIPID PANEL: CPT

## 2024-10-29 PROCEDURE — 82570 ASSAY OF URINE CREATININE: CPT

## 2024-10-29 PROCEDURE — 82043 UR ALBUMIN QUANTITATIVE: CPT

## 2024-10-29 PROCEDURE — 83036 HEMOGLOBIN GLYCOSYLATED A1C: CPT

## 2024-10-30 ENCOUNTER — TELEPHONE (OUTPATIENT)
Dept: FAMILY MEDICINE CLINIC | Facility: CLINIC | Age: 49
End: 2024-10-30

## 2024-10-30 NOTE — TELEPHONE ENCOUNTER
Heladio Todd PA-C  10/30/2024  6:53 AM EDT Back to Top      Pt missed follow up, should schedule  Stable A1c though fasting sugar is very high  Triglycerides remain very high  Stable cbc  Can review further at appt

## 2024-10-30 NOTE — TELEPHONE ENCOUNTER
Left message on machine for the patient to call back and receive results as well as reschedule appointment he missed.

## 2024-11-04 DIAGNOSIS — E11.9 TYPE 2 DIABETES MELLITUS WITHOUT COMPLICATION, WITHOUT LONG-TERM CURRENT USE OF INSULIN (HCC): ICD-10-CM

## 2024-11-05 RX ORDER — EMPAGLIFLOZIN 10 MG/1
10 TABLET, FILM COATED ORAL DAILY
Qty: 90 TABLET | Refills: 3 | Status: SHIPPED | OUTPATIENT
Start: 2024-11-05 | End: 2024-11-11 | Stop reason: SDUPTHER

## 2024-11-10 DIAGNOSIS — E78.1 HIGH TRIGLYCERIDES: ICD-10-CM

## 2024-11-11 DIAGNOSIS — I10 ESSENTIAL HYPERTENSION: ICD-10-CM

## 2024-11-11 DIAGNOSIS — N52.8 OTHER MALE ERECTILE DYSFUNCTION: ICD-10-CM

## 2024-11-11 DIAGNOSIS — M54.50 ACUTE LEFT-SIDED LOW BACK PAIN WITHOUT SCIATICA: ICD-10-CM

## 2024-11-11 DIAGNOSIS — R52 PAIN: ICD-10-CM

## 2024-11-11 DIAGNOSIS — N13.8 BPH WITH OBSTRUCTION/LOWER URINARY TRACT SYMPTOMS: ICD-10-CM

## 2024-11-11 DIAGNOSIS — M10.9 GOUT, UNSPECIFIED CAUSE, UNSPECIFIED CHRONICITY, UNSPECIFIED SITE: ICD-10-CM

## 2024-11-11 DIAGNOSIS — E78.2 MIXED HYPERLIPIDEMIA: ICD-10-CM

## 2024-11-11 DIAGNOSIS — N40.1 BPH WITH OBSTRUCTION/LOWER URINARY TRACT SYMPTOMS: ICD-10-CM

## 2024-11-11 DIAGNOSIS — E11.9 TYPE 2 DIABETES MELLITUS WITHOUT COMPLICATION, WITHOUT LONG-TERM CURRENT USE OF INSULIN (HCC): ICD-10-CM

## 2024-11-12 ENCOUNTER — HOSPITAL ENCOUNTER (OUTPATIENT)
Dept: MRI IMAGING | Facility: HOSPITAL | Age: 49
Discharge: HOME | End: 2024-11-12
Payer: COMMERCIAL

## 2024-11-12 DIAGNOSIS — M67.40 GANGLION CYST: ICD-10-CM

## 2024-11-12 PROCEDURE — 73218 MRI UPPER EXTREMITY W/O DYE: CPT

## 2024-11-12 RX ORDER — FENOFIBRIC ACID 135 MG/1
1 CAPSULE, DELAYED RELEASE ORAL DAILY
Qty: 90 CAPSULE | Refills: 1 | Status: SHIPPED | OUTPATIENT
Start: 2024-11-12

## 2024-11-12 RX ORDER — ATORVASTATIN CALCIUM 80 MG/1
80 TABLET, FILM COATED ORAL DAILY
Qty: 90 TABLET | Refills: 1 | Status: SHIPPED | OUTPATIENT
Start: 2024-11-12

## 2024-11-12 RX ORDER — AMLODIPINE BESYLATE 5 MG/1
10 TABLET ORAL DAILY
Qty: 90 TABLET | Refills: 1 | Status: SHIPPED | OUTPATIENT
Start: 2024-11-12

## 2024-11-12 RX ORDER — METHOCARBAMOL 750 MG/1
TABLET, FILM COATED ORAL
Qty: 30 TABLET | Refills: 0 | OUTPATIENT
Start: 2024-11-12

## 2024-11-12 RX ORDER — LOSARTAN POTASSIUM AND HYDROCHLOROTHIAZIDE 25; 100 MG/1; MG/1
1 TABLET ORAL DAILY
Qty: 90 TABLET | Refills: 1 | Status: SHIPPED | OUTPATIENT
Start: 2024-11-12

## 2024-11-12 RX ORDER — SILDENAFIL 100 MG/1
100 TABLET, FILM COATED ORAL AS NEEDED
Qty: 30 TABLET | Refills: 0 | OUTPATIENT
Start: 2024-11-12

## 2024-11-12 RX ORDER — METFORMIN HYDROCHLORIDE 500 MG/1
1000 TABLET, EXTENDED RELEASE ORAL 2 TIMES DAILY WITH MEALS
Qty: 360 TABLET | Refills: 1 | Status: SHIPPED | OUTPATIENT
Start: 2024-11-12 | End: 2025-05-11

## 2024-11-12 RX ORDER — IBUPROFEN 600 MG/1
600 TABLET, FILM COATED ORAL EVERY 8 HOURS
Qty: 270 TABLET | Refills: 1 | Status: SHIPPED | OUTPATIENT
Start: 2024-11-12

## 2024-11-12 RX ORDER — TAMSULOSIN HYDROCHLORIDE 0.4 MG/1
CAPSULE ORAL
Qty: 90 CAPSULE | Refills: 0 | OUTPATIENT
Start: 2024-11-12

## 2024-11-12 RX ORDER — ICOSAPENT ETHYL 1 G/1
1 CAPSULE ORAL 2 TIMES DAILY
Qty: 180 CAPSULE | Refills: 0 | OUTPATIENT
Start: 2024-11-12

## 2024-11-12 RX ORDER — ALLOPURINOL 300 MG/1
300 TABLET ORAL DAILY
Qty: 90 TABLET | Refills: 1 | Status: SHIPPED | OUTPATIENT
Start: 2024-11-12

## 2024-11-12 NOTE — TELEPHONE ENCOUNTER
Refill must be reviewed and completed by the office or provider. The refill is unable to be approved or denied by the medication management team.    Patient not seen > 1 year

## 2024-11-13 NOTE — TELEPHONE ENCOUNTER
Called and LVM for pt of med refill request received and pt needs appt in office. Call back number provided.

## 2024-12-04 ENCOUNTER — TELEPHONE (OUTPATIENT)
Age: 49
End: 2024-12-04

## 2024-12-04 NOTE — TELEPHONE ENCOUNTER
Caller: Patient     Doctor: Aftab    Reason for call: Patient called would like to discuss scheduling surgery and if needs follow up prior to scheduling. Please advise.    Call back#: [phone]

## 2025-01-17 ENCOUNTER — TELEPHONE (OUTPATIENT)
Age: 50
End: 2025-01-17

## 2025-01-17 NOTE — TELEPHONE ENCOUNTER
Patient called in stating he tested positive yesterday for Covid, patient wants to know if provider would send in paxlovid to [facility] in [place]

## 2025-01-17 NOTE — TELEPHONE ENCOUNTER
Todd called back to follow up on his request for COVID antiviral medication. He called this morning, did not receive a reply from PCP office.    He started with symptoms and tested positive yesterday, 1/16/2025.

## 2025-01-18 ENCOUNTER — OFFICE VISIT (OUTPATIENT)
Dept: URGENT CARE | Facility: CLINIC | Age: 50
End: 2025-01-18
Payer: COMMERCIAL

## 2025-01-18 VITALS
DIASTOLIC BLOOD PRESSURE: 73 MMHG | OXYGEN SATURATION: 94 % | TEMPERATURE: 98.6 F | HEART RATE: 75 BPM | RESPIRATION RATE: 18 BRPM | SYSTOLIC BLOOD PRESSURE: 145 MMHG

## 2025-01-18 DIAGNOSIS — U07.1 COVID: Primary | ICD-10-CM

## 2025-01-18 PROCEDURE — G0382 LEV 3 HOSP TYPE B ED VISIT: HCPCS | Performed by: PHYSICIAN ASSISTANT

## 2025-01-18 PROCEDURE — S9083 URGENT CARE CENTER GLOBAL: HCPCS | Performed by: PHYSICIAN ASSISTANT

## 2025-01-18 RX ORDER — DULAGLUTIDE 3 MG/.5ML
INJECTION, SOLUTION SUBCUTANEOUS
COMMUNITY
Start: 2024-10-28 | End: 2025-01-20

## 2025-01-18 RX ORDER — NIRMATRELVIR AND RITONAVIR 300-100 MG
3 KIT ORAL 2 TIMES DAILY
Qty: 30 TABLET | Refills: 0 | Status: SHIPPED | OUTPATIENT
Start: 2025-01-18 | End: 2025-01-23

## 2025-01-18 NOTE — PATIENT INSTRUCTIONS
Take Paxlovid as instructed.  Advised to hold statin while on medication.  Encourage follow-up with PCP.  Continue supportive care.

## 2025-01-18 NOTE — LETTER
January 18, 2025     Patient: Todd Causey   YOB: 1975   Date of Visit: 1/18/2025       To Whom It May Concern:    It is my medical opinion that Todd Causey may return to work on 01/22/2025 .    If you have any questions or concerns, please don't hesitate to call.         Sincerely,        ERMELINDA EVANS    CC: No Recipients

## 2025-01-18 NOTE — PROGRESS NOTES
[facility]        NAME: Todd Causey is a 49 y.o. male  : 1975    MRN: [id number]  DATE: 2025  TIME: 1:59 PM    Assessment and Plan   COVID [U07.1]  1. COVID  nirmatrelvir & ritonavir (Paxlovid, 300/100,) tablet therapy pack            Patient Instructions     Patient Instructions   Take Paxlovid as instructed.  Advised to hold statin while on medication.  Encourage follow-up with PCP.  Continue supportive care.      Follow up with PCP in 3-5 days.  Proceed to  ER if symptoms worsen.    Chief Complaint     Chief Complaint   Patient presents with    COVID-19     Took home test Thursday and was +          History of Present Illness       Patient is a 49-year-old male presenting today with cold-like symptoms x 2 to 3 days.  Patient notes over last few days he has had some congestion, cough, fatigue and felt febrile.  Patient is also here with his wife who is being evaluated for similar symptoms.  Notes they both tested positive for COVID yesterday on a home test.  Has been trying to contact his PCP for Paxlovid.  Patient has PMH significant for HTN and DM.  Denies chest tightness, SOB, N/V/D.        Review of Systems   Review of Systems   Constitutional:  Positive for fever. Negative for chills.   HENT:  Positive for congestion and sore throat. Negative for ear pain, sinus pressure and trouble swallowing.    Eyes:  Negative for pain.   Respiratory:  Positive for cough. Negative for chest tightness and shortness of breath.    Cardiovascular:  Negative for chest pain.   Gastrointestinal:  Negative for abdominal pain.   Musculoskeletal:  Negative for myalgias.   Skin:  Negative for rash.   Neurological:  Negative for headaches.         Current Medications       Current Outpatient Medications:     allopurinol (ZYLOPRIM) 300 mg tablet, Take 1 tablet (300 mg total) by mouth daily, Disp: 90 tablet, Rfl: 1    amLODIPine (NORVASC) 5 mg tablet, Take 2 tablets (10 mg total) by mouth daily, Disp: 90  tablet, Rfl: 1    atorvastatin (LIPITOR) 80 mg tablet, Take 1 tablet (80 mg total) by mouth daily, Disp: 90 tablet, Rfl: 1    Blood Glucose Monitoring Suppl (ONE TOUCH ULTRA 2) w/Device KIT, 1 kit by Does not apply route daily, Disp: , Rfl:     Choline Fenofibrate (Fenofibric Acid) 135 MG CPDR, Take 1 capsule (135 mg total) by mouth daily, Disp: 90 capsule, Rfl: 1    Empagliflozin (Jardiance) 10 MG TABS tablet, Take 1 tablet (10 mg total) by mouth daily, Disp: 90 tablet, Rfl: 1    glucose blood test strip, Use 1 each daily, Disp: 100 each, Rfl: 5    ibuprofen (MOTRIN) 600 mg tablet, Take 1 tablet (600 mg total) by mouth every 8 (eight) hours, Disp: 270 tablet, Rfl: 1    Icosapent Ethyl 1 g CAPS, TAKE 1 CAPSULE TWICE A DAY, Disp: 180 capsule, Rfl: 3    indomethacin (INDOCIN) 50 mg capsule, Take 1 capsule twice a day as needed for mild pain, Disp: 60 capsule, Rfl: 5    losartan-hydrochlorothiazide (HYZAAR) 100-25 MG per tablet, Take 1 tablet by mouth daily, Disp: 90 tablet, Rfl: 1    metFORMIN (GLUCOPHAGE-XR) 500 mg 24 hr tablet, Take 2 tablets (1,000 mg total) by mouth 2 (two) times a day with meals, Disp: 360 tablet, Rfl: 1    methocarbamol (ROBAXIN) 750 mg tablet, TAKE 1 TABLET EVERY 6 HOURS AS NEEDED FOR MUSCLE SPASMS, Disp: 30 tablet, Rfl: 1    nirmatrelvir & ritonavir (Paxlovid, 300/100,) tablet therapy pack, Take 3 tablets by mouth 2 (two) times a day for 5 days Take 2 nirmatrelvir tablets + 1 ritonavir tablet together per dose, Disp: 30 tablet, Rfl: 0    ONETOUCH DELICA LANCETS 33G MISC, 1 Units by Does not apply route daily, Disp: , Rfl:     sildenafil (VIAGRA) 100 mg tablet, Take 1 tablet (100 mg total) by mouth as needed for erectile dysfunction, Disp: 30 tablet, Rfl: 3    tamsulosin (FLOMAX) 0.4 mg, TAKE 1 CAPSULE DAILY WITH DINNER, Disp: 90 capsule, Rfl: 1    Trulicity 3 MG/0.5ML SOAJ, , Disp: , Rfl:     dulaglutide (Trulicity) 3 MG/0.5ML injection, Inject 0.5 mL (3 mg total) under the skin every 7 days,  Disp: 6 mL, Rfl: 1    Current Allergies     Allergies as of 01/18/2025    (No Known Allergies)            The following portions of the patient's history were reviewed and updated as appropriate: allergies, current medications, past family history, past medical history, past social history, past surgical history and problem list.     Past Medical History:   Diagnosis Date    CPAP (continuous positive airway pressure) dependence     Diabetes (HCC)     Diabetes mellitus (HCC)     Gout     HTN (hypertension)     Hyperlipidemia     Kidney stone     RIGHT    Morbid obesity with BMI of 40.0-44.9, adult (HCC)     SANCHO on CPAP        Past Surgical History:   Procedure Laterality Date    GANGLION CYST EXCISION Left 2/19/2019    Procedure: WRIST GANGLION CYST EXCISION;  Surgeon: Kana Hanson MD;  [facility];  Service: Orthopedics    [place] CYSTO/URETERO W/LITHOTRIPSY &INDWELL STENT INSRT Right 5/1/2017    Procedure: CYSTOSCOPY URETEROSCOPY WITH LITHOTRIPSY HOLMIUM LASER, RETROGRADE PYELOGRAM AND INSERTION STENT URETERAL;  Surgeon: Zoltan Whitney MD;  [facility];  Service: Urology    TX LITHOTRIPSY XTRCORP SHOCK WAVE Left 6/19/2017    Procedure: LITHROTRIPSY EXTRACORPORAL SHOCKWAVE (ESWL);  Surgeon: Zoltan Whitney MD;  [facility];  Service: Urology    TX NDSC WRST SURG W/RLS TRANSVRS CARPL LIGM Left 2/19/2019    Procedure: ENDOSCOPIC CARPAL TUNNEL RELEASE;  Surgeon: Kana Hanson MD;  [facility];  Service: Orthopedics       Family History   Problem Relation Age of Onset    Bone cancer Father     Prostate cancer Maternal Grandfather     Cancer Family     Hypertension Family          Medications have been verified.        Objective   /73   Pulse 75   Temp 98.6 °F (37 °C)   Resp 18   SpO2 94%        Physical Exam     Physical Exam  Vitals and nursing note reviewed.   Constitutional:       General: He is not in acute distress.     Appearance: Normal appearance. He is obese. He is  not ill-appearing.   HENT:      Head: Normocephalic and atraumatic.      Right Ear: Tympanic membrane, ear canal and external ear normal.      Left Ear: Tympanic membrane, ear canal and external ear normal.      Nose: Congestion present.      Right Turbinates: Swollen and pale.      Left Turbinates: Swollen and pale.      Right Sinus: No maxillary sinus tenderness or frontal sinus tenderness.      Left Sinus: No maxillary sinus tenderness or frontal sinus tenderness.      Mouth/Throat:      Mouth: Mucous membranes are moist.      Pharynx: Oropharynx is clear. No oropharyngeal exudate or posterior oropharyngeal erythema.   Eyes:      Conjunctiva/sclera: Conjunctivae normal.      Pupils: Pupils are equal, round, and reactive to light.   Cardiovascular:      Rate and Rhythm: Normal rate and regular rhythm.      Pulses: Normal pulses.      Heart sounds: Normal heart sounds.   Pulmonary:      Effort: Pulmonary effort is normal.      Breath sounds: Normal breath sounds.   Musculoskeletal:      Cervical back: Normal range of motion. No tenderness.   Lymphadenopathy:      Cervical: No cervical adenopathy.   Skin:     General: Skin is warm.      Capillary Refill: Capillary refill takes less than 2 seconds.   Neurological:      Mental Status: He is alert.

## 2025-01-20 ENCOUNTER — TELEPHONE (OUTPATIENT)
Age: 50
End: 2025-01-20

## 2025-01-20 DIAGNOSIS — E11.69 TYPE 2 DIABETES MELLITUS WITH OBESITY  (HCC): Primary | ICD-10-CM

## 2025-01-20 DIAGNOSIS — E66.9 TYPE 2 DIABETES MELLITUS WITH OBESITY  (HCC): ICD-10-CM

## 2025-01-20 DIAGNOSIS — E11.69 TYPE 2 DIABETES MELLITUS WITH OBESITY  (HCC): ICD-10-CM

## 2025-01-20 DIAGNOSIS — E66.9 TYPE 2 DIABETES MELLITUS WITH OBESITY  (HCC): Primary | ICD-10-CM

## 2025-01-20 RX ORDER — DULAGLUTIDE 3 MG/.5ML
3 INJECTION, SOLUTION SUBCUTANEOUS WEEKLY
Qty: 2 ML | Refills: 0 | Status: SHIPPED | OUTPATIENT
Start: 2025-01-20 | End: 2025-01-20 | Stop reason: SDUPTHER

## 2025-01-20 NOTE — TELEPHONE ENCOUNTER
Patient's wife called in stating they both tested positive for covid and were seen in the urgent care. Patient was put on the paxlovid. They're going to retest either today or tomorrow. If he wouldn't go back into work on Wednesday due to still being positive can he have another doctors note.    Please advise

## 2025-01-21 ENCOUNTER — RA CDI HCC (OUTPATIENT)
Dept: OTHER | Facility: HOSPITAL | Age: 50
End: 2025-01-21

## 2025-01-21 ENCOUNTER — OFFICE VISIT (OUTPATIENT)
Dept: OBGYN CLINIC | Facility: CLINIC | Age: 50
End: 2025-01-21
Payer: COMMERCIAL

## 2025-01-21 VITALS — BODY MASS INDEX: 40.3 KG/M2 | WEIGHT: 314 LBS | HEIGHT: 74 IN

## 2025-01-21 DIAGNOSIS — E11.59 TYPE 2 DIABETES MELLITUS WITH OTHER CIRCULATORY COMPLICATIONS (HCC): ICD-10-CM

## 2025-01-21 DIAGNOSIS — M67.40 GANGLION CYST: Primary | ICD-10-CM

## 2025-01-21 DIAGNOSIS — Z01.812 PRE-OPERATIVE LABORATORY EXAMINATION: ICD-10-CM

## 2025-01-21 PROCEDURE — 99213 OFFICE O/P EST LOW 20 MIN: CPT | Performed by: ORTHOPAEDIC SURGERY

## 2025-01-21 RX ORDER — CHLORHEXIDINE GLUCONATE ORAL RINSE 1.2 MG/ML
15 SOLUTION DENTAL ONCE
OUTPATIENT
Start: 2025-01-21 | End: 2025-01-21

## 2025-01-21 RX ORDER — CHLORHEXIDINE GLUCONATE 40 MG/ML
SOLUTION TOPICAL DAILY PRN
OUTPATIENT
Start: 2025-01-21

## 2025-01-21 RX ORDER — DULAGLUTIDE 3 MG/.5ML
3 INJECTION, SOLUTION SUBCUTANEOUS WEEKLY
Qty: 6 ML | Refills: 0 | Status: SHIPPED | OUTPATIENT
Start: 2025-01-21 | End: 2025-02-20

## 2025-01-21 NOTE — PROGRESS NOTES
Assessment/Plan:   Diagnoses and all orders for this visit:    Ganglion cyst    Pre-operative laboratory examination    Reviewed physical exam and MRI results with the patient at time of visit. The patient does have a recurrent ganglion cyst over the right thumb. Discussed the patient's diagnosis and associated treatment options including conservative and surgical treatment. Discussed risks, potential complications, and benefits of surgical procedure in great detail. The patient understands the risks and benefits of all mention treatment options and has no further questions.  The patient has elected to proceed forward with an excision of a mass of the right thumb. Surgery is tentatively scheduled for February 10, 2024. He will be seen for follow-up 10 to 14 days post-op for reevaluation. A surgical consent was obtained at today's visit. The patient expressed understanding and had the opportunity to ask questions.     The patient has a mass along his right thumb.  Treatment options were discussed.  He is opted for elective excision of the mass.  All risk, complications, benefits were discussed with the patient in great detail including bleeding, infection, blood clots, pain, stiffness, neurovascular damage, fractures, dislocations, the possibility loss of life from surgery, wound problems, recurrence of the mass, the possibility this is a malignant event needing further adjuvant treatment, etc.  His surgery scheduled for February 10, 2024      Subjective:   Patient ID: Todd Causey  1975     HPI  Patient is a 49 y.o. male who presents for follow-up evaluation of his right thumb. The patient has a history of a ganglion cyst over the IP joint of his right thumb. He last had the cyst aspirated on 8/6/2024. However, he did attempt to aspirate the thumb prior to his last visit. At this point, the patient would like to proceed with surgical intervention. He states that the cyst continues to return. He reports point  tenderness. He denies numbness and tingling.      The following portions of the patient's history were reviewed and updated as appropriate:  Past medical history, past surgical history, Family history, social history, current medications and allergies    Past Medical History:   Diagnosis Date    CPAP (continuous positive airway pressure) dependence     Diabetes (HCC)     Diabetes mellitus (HCC)     Gout     HTN (hypertension)     Hyperlipidemia     Kidney stone     RIGHT    Morbid obesity with BMI of 40.0-44.9, adult (HCC)     SANCHO on CPAP        Past Surgical History:   Procedure Laterality Date    GANGLION CYST EXCISION Left 2019    Procedure: WRIST GANGLION CYST EXCISION;  Surgeon: Kana Hanson MD;  Location: [facility];  Service: Orthopedics    TX CYSTO/URETERO W/LITHOTRIPSY &INDWELL STENT INSRT Right 2017    Procedure: CYSTOSCOPY URETEROSCOPY WITH LITHOTRIPSY HOLMIUM LASER, RETROGRADE PYELOGRAM AND INSERTION STENT URETERAL;  Surgeon: Zoltan Whitney MD;  [facility];  Service: Urology    TX LITHOTRIPSY XTRCORP SHOCK WAVE Left 2017    Procedure: LITHROTRIPSY EXTRACORPORAL SHOCKWAVE (ESWL);  Surgeon: Zoltan Whitney MD;  [facility];  Service: Urology    TX NDSC WRST SURG W/RLS TRANSVRS CARPL LIGM Left 2019    Procedure: ENDOSCOPIC CARPAL TUNNEL RELEASE;  Surgeon: Kana Hanson MD;  [facility];  Service: Orthopedics       Family History   Problem Relation Age of Onset    Bone cancer Father     Prostate cancer Maternal Grandfather     Cancer Family     Hypertension Family        Social History     Socioeconomic History    Marital status: /Civil Union     Spouse name: None    Number of children: None    Years of education: None    Highest education level: None   Occupational History    None   Tobacco Use    Smoking status: Former     Current packs/day: 0.00     Types: Cigarettes     Quit date:      Years since quittin.0    Smokeless tobacco: Former      Types: Chew    Tobacco comments:     Former Smoker as per allscripts   Vaping Use    Vaping status: Never Used   Substance and Sexual Activity    Alcohol use: Yes     Alcohol/week: 1.0 standard drink of alcohol     Types: 1 Cans of beer per week     Comment: socially    Drug use: No    Sexual activity: None   Other Topics Concern    None   Social History Narrative    Daily Coffee Consumption    Daily Tea Consumption     Social Drivers of Health     Financial Resource Strain: Not on file   Food Insecurity: Not on file   Transportation Needs: Not on file   Physical Activity: Not on file   Stress: Not on file (11/4/2024)   Social Connections: Not on file   Intimate Partner Violence: Low Risk  (4/13/2021)    [facility], [facility]    Intimate Partner Violence     Insults You: Not on file     Threatens You: Not on file     Screams at You: Not on file     Physically Hurt: Not on file     Intimate Partner Violence Score: Not on file   Housing Stability: Not on file         Current Outpatient Medications:     allopurinol (ZYLOPRIM) 300 mg tablet, Take 1 tablet (300 mg total) by mouth daily, Disp: 90 tablet, Rfl: 1    amLODIPine (NORVASC) 5 mg tablet, Take 2 tablets (10 mg total) by mouth daily, Disp: 90 tablet, Rfl: 1    atorvastatin (LIPITOR) 80 mg tablet, Take 1 tablet (80 mg total) by mouth daily, Disp: 90 tablet, Rfl: 1    Blood Glucose Monitoring Suppl (ONE TOUCH ULTRA 2) w/Device KIT, 1 kit by Does not apply route daily, Disp: , Rfl:     Choline Fenofibrate (Fenofibric Acid) 135 MG CPDR, Take 1 capsule (135 mg total) by mouth daily, Disp: 90 capsule, Rfl: 1    dulaglutide (Trulicity) 3 MG/0.5ML injection, Inject 0.5 mL (3 mg total) under the skin every 7 days, Disp: 6 mL, Rfl: 1    Dulaglutide (Trulicity) 3 MG/0.5ML SOAJ, Inject 3 mg under the skin once a week, Disp: 2 mL, Rfl: 0    Empagliflozin (Jardiance) 10 MG TABS tablet, Take 1 tablet (10 mg total) by mouth daily, Disp: 90 tablet, Rfl: 1     glucose blood test strip, Use 1 each daily, Disp: 100 each, Rfl: 5    ibuprofen (MOTRIN) 600 mg tablet, Take 1 tablet (600 mg total) by mouth every 8 (eight) hours, Disp: 270 tablet, Rfl: 1    Icosapent Ethyl 1 g CAPS, TAKE 1 CAPSULE TWICE A DAY, Disp: 180 capsule, Rfl: 3    indomethacin (INDOCIN) 50 mg capsule, Take 1 capsule twice a day as needed for mild pain, Disp: 60 capsule, Rfl: 5    losartan-hydrochlorothiazide (HYZAAR) 100-25 MG per tablet, Take 1 tablet by mouth daily, Disp: 90 tablet, Rfl: 1    metFORMIN (GLUCOPHAGE-XR) 500 mg 24 hr tablet, Take 2 tablets (1,000 mg total) by mouth 2 (two) times a day with meals, Disp: 360 tablet, Rfl: 1    methocarbamol (ROBAXIN) 750 mg tablet, TAKE 1 TABLET EVERY 6 HOURS AS NEEDED FOR MUSCLE SPASMS, Disp: 30 tablet, Rfl: 1    nirmatrelvir & ritonavir (Paxlovid, 300/100,) tablet therapy pack, Take 3 tablets by mouth 2 (two) times a day for 5 days Take 2 nirmatrelvir tablets + 1 ritonavir tablet together per dose, Disp: 30 tablet, Rfl: 0    ONETOUCH DELICA LANCETS 33G MISC, 1 Units by Does not apply route daily, Disp: , Rfl:     sildenafil (VIAGRA) 100 mg tablet, Take 1 tablet (100 mg total) by mouth as needed for erectile dysfunction, Disp: 30 tablet, Rfl: 3    tamsulosin (FLOMAX) 0.4 mg, TAKE 1 CAPSULE DAILY WITH DINNER, Disp: 90 capsule, Rfl: 1    No Known Allergies    Review of Systems   Constitutional:  Negative for chills and fever.   HENT:  Negative for ear pain and sore throat.    Eyes:  Negative for pain and visual disturbance.   Respiratory:  Negative for cough and shortness of breath.    Cardiovascular:  Negative for chest pain and palpitations.   Gastrointestinal:  Negative for abdominal pain and vomiting.   Genitourinary:  Negative for dysuria and hematuria.   Musculoskeletal:  Negative for arthralgias and back pain.   Skin:  Negative for color change and rash.   Neurological:  Negative for seizures and syncope.   All other systems reviewed and are  "negative.       Objective:  Ht 6' 2\" (1.88 m)   Wt (!) 142 kg (314 lb)   BMI 40.32 kg/m²     Ortho Exam  right Hand -    Patient presents with ganglion cyst over IP joint of right thumb   Skin is warm and dry to touch with no signs of erythema, ecchymosis, or infection  No soft tissue swelling or effusion noted  Full FDS, FDP, extensor mechanisms are intact  Demonstrates normal wrist, elbow, and shoulder motion  Forearm compartments are soft and supple  2+ distal radial pulse with brisk capillary refill to the fingers  Radial, median, and ulnar motor and sensory distribution intact  Sensations light to touch intact distally      Physical Exam  HENT:      Head: Normocephalic and atraumatic.      Nose: Nose normal.   Eyes:      Conjunctiva/sclera: Conjunctivae normal.   Cardiovascular:      Rate and Rhythm: Normal rate.   Pulmonary:      Effort: Pulmonary effort is normal.   Musculoskeletal:      Cervical back: Neck supple.   Skin:     General: Skin is warm and dry.      Capillary Refill: Capillary refill takes less than 2 seconds.   Neurological:      Mental Status: He is alert and oriented to person, place, and time.   Psychiatric:         Mood and Affect: Mood normal.         Behavior: Behavior normal.          Diagnostic Test Review:  MRI of right thumb obtained on 11/12/2024 were reviewed and demonstrate The marked palpable abnormality correlates with a 3 x 3 x 3 mm probable ganglion cyst at the radial aspect of the thumb interphalangeal joint.       Scribe Attestation      I,:  Ashley Sainz am acting as a scribe while in the presence of the attending physician.:       I,:  Everardo Ugalde, DO personally performed the services described in this documentation    as scribed in my presence.:              "

## 2025-01-21 NOTE — H&P (VIEW-ONLY)
Assessment/Plan:   Diagnoses and all orders for this visit:    Ganglion cyst    Pre-operative laboratory examination    Reviewed physical exam and MRI results with the patient at time of visit. The patient does have a recurrent ganglion cyst over the right thumb. Discussed the patient's diagnosis and associated treatment options including conservative and surgical treatment. Discussed risks, potential complications, and benefits of surgical procedure in great detail. The patient understands the risks and benefits of all mention treatment options and has no further questions.  The patient has elected to proceed forward with an excision of a mass of the right thumb. Surgery is tentatively scheduled for February 10, 2024. He will be seen for follow-up 10 to 14 days post-op for reevaluation. A surgical consent was obtained at today's visit. The patient expressed understanding and had the opportunity to ask questions.     The patient has a mass along his right thumb.  Treatment options were discussed.  He is opted for elective excision of the mass.  All risk, complications, benefits were discussed with the patient in great detail including bleeding, infection, blood clots, pain, stiffness, neurovascular damage, fractures, dislocations, the possibility loss of life from surgery, wound problems, recurrence of the mass, the possibility this is a malignant event needing further adjuvant treatment, etc.  His surgery scheduled for February 10, 2024      Subjective:   Patient ID: Todd Causey  1975     HPI  Patient is a 49 y.o. male who presents for follow-up evaluation of his right thumb. The patient has a history of a ganglion cyst over the IP joint of his right thumb. He last had the cyst aspirated on 8/6/2024. However, he did attempt to aspirate the thumb prior to his last visit. At this point, the patient would like to proceed with surgical intervention. He states that the cyst continues to return. He reports point  tenderness. He denies numbness and tingling.      The following portions of the patient's history were reviewed and updated as appropriate:  Past medical history, past surgical history, Family history, social history, current medications and allergies    Past Medical History:   Diagnosis Date    CPAP (continuous positive airway pressure) dependence     Diabetes (HCC)     Diabetes mellitus (HCC)     Gout     HTN (hypertension)     Hyperlipidemia     Kidney stone     RIGHT    Morbid obesity with BMI of 40.0-44.9, adult (HCC)     SANCHO on CPAP        Past Surgical History:   Procedure Laterality Date    GANGLION CYST EXCISION Left 2019    Procedure: WRIST GANGLION CYST EXCISION;  Surgeon: Kana Hanson MD;  [facility];  Service: Orthopedics    [place] CYSTO/URETERO W/LITHOTRIPSY &INDWELL STENT INSRT Right 2017    Procedure: CYSTOSCOPY URETEROSCOPY WITH LITHOTRIPSY HOLMIUM LASER, RETROGRADE PYELOGRAM AND INSERTION STENT URETERAL;  Surgeon: Zoltan Whitney MD;  [facility];  Service: Urology    MA LITHOTRIPSY XTRCORP SHOCK WAVE Left 2017    Procedure: LITHROTRIPSY EXTRACORPORAL SHOCKWAVE (ESWL);  Surgeon: Zoltan Whitney MD;  [facility];  Service: Urology    MA NDSC WRST SURG W/RLS TRANSVRS CARPL LIGM Left 2019    Procedure: ENDOSCOPIC CARPAL TUNNEL RELEASE;  Surgeon: Kana Hanson MD;  [facility];  Service: Orthopedics       Family History   Problem Relation Age of Onset    Bone cancer Father     Prostate cancer Maternal Grandfather     Cancer Family     Hypertension Family        Social History     Socioeconomic History    Marital status: /Civil Union     Spouse name: None    Number of children: None    Years of education: None    Highest education level: None   Occupational History    None   Tobacco Use    Smoking status: Former     Current packs/day: 0.00     Types: Cigarettes     Quit date:      Years since quittin.0    Smokeless tobacco: Former      Types: Chew    Tobacco comments:     Former Smoker as per allscripts   Vaping Use    Vaping status: Never Used   Substance and Sexual Activity    Alcohol use: Yes     Alcohol/week: 1.0 standard drink of alcohol     Types: 1 Cans of beer per week     Comment: socially    Drug use: No    Sexual activity: None   Other Topics Concern    None   Social History Narrative    Daily Coffee Consumption    Daily Tea Consumption     Social Drivers of Health     Financial Resource Strain: Not on file   Food Insecurity: Not on file   Transportation Needs: Not on file   Physical Activity: Not on file   Stress: Not on file (11/4/2024)   Social Connections: Not on file   Intimate Partner Violence: Low Risk  (4/13/2021)    [facility]    Intimate Partner Violence     Insults You: Not on file     Threatens You: Not on file     Screams at You: Not on file     Physically Hurt: Not on file     Intimate Partner Violence Score: Not on file   Housing Stability: Not on file         Current Outpatient Medications:     allopurinol (ZYLOPRIM) 300 mg tablet, Take 1 tablet (300 mg total) by mouth daily, Disp: 90 tablet, Rfl: 1    amLODIPine (NORVASC) 5 mg tablet, Take 2 tablets (10 mg total) by mouth daily, Disp: 90 tablet, Rfl: 1    atorvastatin (LIPITOR) 80 mg tablet, Take 1 tablet (80 mg total) by mouth daily, Disp: 90 tablet, Rfl: 1    Blood Glucose Monitoring Suppl (ONE TOUCH ULTRA 2) w/Device KIT, 1 kit by Does not apply route daily, Disp: , Rfl:     Choline Fenofibrate (Fenofibric Acid) 135 MG CPDR, Take 1 capsule (135 mg total) by mouth daily, Disp: 90 capsule, Rfl: 1    dulaglutide (Trulicity) 3 MG/0.5ML injection, Inject 0.5 mL (3 mg total) under the skin every 7 days, Disp: 6 mL, Rfl: 1    Dulaglutide (Trulicity) 3 MG/0.5ML SOAJ, Inject 3 mg under the skin once a week, Disp: 2 mL, Rfl: 0    Empagliflozin (Jardiance) 10 MG TABS tablet, Take 1 tablet (10 mg total) by mouth daily, Disp: 90 tablet, Rfl: 1     glucose blood test strip, Use 1 each daily, Disp: 100 each, Rfl: 5    ibuprofen (MOTRIN) 600 mg tablet, Take 1 tablet (600 mg total) by mouth every 8 (eight) hours, Disp: 270 tablet, Rfl: 1    Icosapent Ethyl 1 g CAPS, TAKE 1 CAPSULE TWICE A DAY, Disp: 180 capsule, Rfl: 3    indomethacin (INDOCIN) 50 mg capsule, Take 1 capsule twice a day as needed for mild pain, Disp: 60 capsule, Rfl: 5    losartan-hydrochlorothiazide (HYZAAR) 100-25 MG per tablet, Take 1 tablet by mouth daily, Disp: 90 tablet, Rfl: 1    metFORMIN (GLUCOPHAGE-XR) 500 mg 24 hr tablet, Take 2 tablets (1,000 mg total) by mouth 2 (two) times a day with meals, Disp: 360 tablet, Rfl: 1    methocarbamol (ROBAXIN) 750 mg tablet, TAKE 1 TABLET EVERY 6 HOURS AS NEEDED FOR MUSCLE SPASMS, Disp: 30 tablet, Rfl: 1    nirmatrelvir & ritonavir (Paxlovid, 300/100,) tablet therapy pack, Take 3 tablets by mouth 2 (two) times a day for 5 days Take 2 nirmatrelvir tablets + 1 ritonavir tablet together per dose, Disp: 30 tablet, Rfl: 0    ONETOUCH DELICA LANCETS 33G MISC, 1 Units by Does not apply route daily, Disp: , Rfl:     sildenafil (VIAGRA) 100 mg tablet, Take 1 tablet (100 mg total) by mouth as needed for erectile dysfunction, Disp: 30 tablet, Rfl: 3    tamsulosin (FLOMAX) 0.4 mg, TAKE 1 CAPSULE DAILY WITH DINNER, Disp: 90 capsule, Rfl: 1    No Known Allergies    Review of Systems   Constitutional:  Negative for chills and fever.   HENT:  Negative for ear pain and sore throat.    Eyes:  Negative for pain and visual disturbance.   Respiratory:  Negative for cough and shortness of breath.    Cardiovascular:  Negative for chest pain and palpitations.   Gastrointestinal:  Negative for abdominal pain and vomiting.   Genitourinary:  Negative for dysuria and hematuria.   Musculoskeletal:  Negative for arthralgias and back pain.   Skin:  Negative for color change and rash.   Neurological:  Negative for seizures and syncope.   All other systems reviewed and are  "negative.       Objective:  Ht 6' 2\" (1.88 m)   Wt (!) 142 kg (314 lb)   BMI 40.32 kg/m²     Ortho Exam  right Hand -    Patient presents with ganglion cyst over IP joint of right thumb   Skin is warm and dry to touch with no signs of erythema, ecchymosis, or infection  No soft tissue swelling or effusion noted  Full FDS, FDP, extensor mechanisms are intact  Demonstrates normal wrist, elbow, and shoulder motion  Forearm compartments are soft and supple  2+ distal radial pulse with brisk capillary refill to the fingers  Radial, median, and ulnar motor and sensory distribution intact  Sensations light to touch intact distally      Physical Exam  HENT:      Head: Normocephalic and atraumatic.      Nose: Nose normal.   Eyes:      Conjunctiva/sclera: Conjunctivae normal.   Cardiovascular:      Rate and Rhythm: Normal rate.   Pulmonary:      Effort: Pulmonary effort is normal.   Musculoskeletal:      Cervical back: Neck supple.   Skin:     General: Skin is warm and dry.      Capillary Refill: Capillary refill takes less than 2 seconds.   Neurological:      Mental Status: He is alert and oriented to person, place, and time.   Psychiatric:         Mood and Affect: Mood normal.         Behavior: Behavior normal.          Diagnostic Test Review:  MRI of right thumb obtained on 11/12/2024 were reviewed and demonstrate The marked palpable abnormality correlates with a 3 x 3 x 3 mm probable ganglion cyst at the radial aspect of the thumb interphalangeal joint.       Scribe Attestation      I,:  Ashley Sainz am acting as a scribe while in the presence of the attending physician.:       I,:  Everardo Ugalde, DO personally performed the services described in this documentation    as scribed in my presence.:              "

## 2025-01-21 NOTE — PROGRESS NOTES
HCC coding opportunities          Chart Reviewed number of suggestions sent to Provider: 1     Patients Insurance        Commercial Insurance: [employer]     [id number]

## 2025-01-28 ENCOUNTER — OFFICE VISIT (OUTPATIENT)
Dept: FAMILY MEDICINE CLINIC | Facility: CLINIC | Age: 50
End: 2025-01-28
Payer: COMMERCIAL

## 2025-01-28 VITALS
SYSTOLIC BLOOD PRESSURE: 136 MMHG | HEIGHT: 74 IN | WEIGHT: 314 LBS | OXYGEN SATURATION: 95 % | DIASTOLIC BLOOD PRESSURE: 84 MMHG | HEART RATE: 80 BPM | TEMPERATURE: 97.4 F | BODY MASS INDEX: 40.3 KG/M2

## 2025-01-28 DIAGNOSIS — B35.1 ONYCHOMYCOSIS OF MULTIPLE TOENAILS WITH TYPE 2 DIABETES MELLITUS  (HCC): ICD-10-CM

## 2025-01-28 DIAGNOSIS — E11.69 HYPERLIPIDEMIA ASSOCIATED WITH TYPE 2 DIABETES MELLITUS  (HCC): ICD-10-CM

## 2025-01-28 DIAGNOSIS — I10 HYPERTENSION COMPLICATING DIABETES (HCC): ICD-10-CM

## 2025-01-28 DIAGNOSIS — E11.9 TYPE 2 DIABETES MELLITUS WITHOUT COMPLICATION, WITHOUT LONG-TERM CURRENT USE OF INSULIN (HCC): Primary | ICD-10-CM

## 2025-01-28 DIAGNOSIS — E11.69 ONYCHOMYCOSIS OF MULTIPLE TOENAILS WITH TYPE 2 DIABETES MELLITUS  (HCC): ICD-10-CM

## 2025-01-28 DIAGNOSIS — E66.9 TYPE 2 DIABETES MELLITUS WITH OBESITY  (HCC): ICD-10-CM

## 2025-01-28 DIAGNOSIS — Z12.5 SCREENING FOR PROSTATE CANCER: ICD-10-CM

## 2025-01-28 DIAGNOSIS — E78.5 HYPERLIPIDEMIA ASSOCIATED WITH TYPE 2 DIABETES MELLITUS  (HCC): ICD-10-CM

## 2025-01-28 DIAGNOSIS — E11.9 HYPERTENSION COMPLICATING DIABETES (HCC): ICD-10-CM

## 2025-01-28 DIAGNOSIS — E11.69 TYPE 2 DIABETES MELLITUS WITH OBESITY  (HCC): ICD-10-CM

## 2025-01-28 LAB — SL AMB POCT HEMOGLOBIN AIC: 8.1 (ref ?–6.5)

## 2025-01-28 PROCEDURE — 99214 OFFICE O/P EST MOD 30 MIN: CPT | Performed by: PHYSICIAN ASSISTANT

## 2025-01-28 RX ORDER — TERBINAFINE HYDROCHLORIDE 250 MG/1
250 TABLET ORAL DAILY
Qty: 84 TABLET | Refills: 0 | Status: SHIPPED | OUTPATIENT
Start: 2025-01-28 | End: 2025-04-22

## 2025-01-28 NOTE — ASSESSMENT & PLAN NOTE
Check FLP, continue statin  Lab Results   Component Value Date    HGBA1C 8.1 (A) 01/28/2025

## 2025-01-28 NOTE — ASSESSMENT & PLAN NOTE
BMI Counseling: Body mass index is 40.32 kg/m². The BMI is above normal. Nutrition recommendations include reducing portion sizes, decreasing overall calorie intake, moderation in carbohydrate intake, increasing intake of lean protein, reducing intake of saturated fat and trans fat, and reducing intake of cholesterol. Exercise recommendations include moderate aerobic physical activity for 150 minutes/week.    Lab Results   Component Value Date    HGBA1C 8.1 (A) 01/28/2025

## 2025-01-28 NOTE — PROGRESS NOTES
Name: Todd Causey      : 1975      MRN: [id number]  Encounter Provider: Heladio Todd PA-C  Encounter Date: 2025   Encounter department: [facility]    Assessment & Plan  Type 2 diabetes mellitus without complication, without long-term current use of insulin (HCC)  A1c increased, previously controlled on this regimen  Lower carbs in diet, more CV exercise  Recheck A1c in 3 months  Lab Results   Component Value Date    HGBA1C 8.1 (A) 2025       Orders:  •  POCT hemoglobin A1c  •  Comprehensive metabolic panel; Future  •  CBC and differential; Future  •  Albumin / creatinine urine ratio; Future  •  Lipid Panel with Direct LDL reflex; Future    Hyperlipidemia associated with type 2 diabetes mellitus  (HCC)  Check FLP, continue statin  Lab Results   Component Value Date    HGBA1C 8.1 (A) 2025            Type 2 diabetes mellitus with obesity  (HCC)  BMI Counseling: Body mass index is 40.32 kg/m². The BMI is above normal. Nutrition recommendations include reducing portion sizes, decreasing overall calorie intake, moderation in carbohydrate intake, increasing intake of lean protein, reducing intake of saturated fat and trans fat, and reducing intake of cholesterol. Exercise recommendations include moderate aerobic physical activity for 150 minutes/week.    Lab Results   Component Value Date    HGBA1C 8.1 (A) 2025            Hypertension complicating diabetes (HCC)  BP at goal, continue current regimen  Lab Results   Component Value Date    HGBA1C 8.1 (A) 2025            Screening for prostate cancer    Orders:  •  PSA, Total Screen; Future    Onychomycosis of multiple toenails with type 2 diabetes mellitus  (HCC)  Lamisil for 12 weeks  Lab Results   Component Value Date    HGBA1C 8.1 (A) 2025       Orders:  •  terbinafine (LamISIL) 250 mg tablet; Take 1 tablet (250 mg total) by mouth daily         History of Present Illness     Pt presents for routine follow  up. Labs not complete prior to visit      DM: a1c 8.1 from 7.4,, on jardiance, trulicity, metformin. Notes poor diet. +statin, arb  HLD: on high dose statin, fibrate, fish oil, due for FLP  HTN: at goal,  on hyzaar, amlodipine. no end organ complaints        Review of Systems   Constitutional:  Negative for chills, fatigue and fever.   HENT:  Negative for congestion, ear pain, hearing loss, nosebleeds, postnasal drip, rhinorrhea, sinus pressure, sinus pain, sneezing and sore throat.    Eyes:  Negative for pain, discharge, itching and visual disturbance.   Respiratory:  Negative for cough, chest tightness, shortness of breath and wheezing.    Cardiovascular:  Negative for chest pain, palpitations and leg swelling.   Gastrointestinal:  Negative for abdominal pain, blood in stool, constipation, diarrhea, nausea and vomiting.   Genitourinary:  Negative for frequency and urgency.   Neurological:  Negative for dizziness, light-headedness and numbness.     Past Medical History:   Diagnosis Date   • CPAP (continuous positive airway pressure) dependence    • Diabetes (HCC)    • Diabetes mellitus (HCC)    • Gout    • HTN (hypertension)    • Hyperlipidemia    • Kidney stone     RIGHT   • Morbid obesity with BMI of 40.0-44.9, adult (HCC)    • SANCHO on CPAP      Past Surgical History:   Procedure Laterality Date   • GANGLION CYST EXCISION Left 2/19/2019    Procedure: WRIST GANGLION CYST EXCISION;  Surgeon: Kana Hanson MD;  Location: [facility];  Service: Orthopedics   • MI CYSTO/URETERO W/LITHOTRIPSY &INDWELL STENT INSRT Right 5/1/2017    Procedure: CYSTOSCOPY URETEROSCOPY WITH LITHOTRIPSY HOLMIUM LASER, RETROGRADE PYELOGRAM AND INSERTION STENT URETERAL;  Surgeon: Zoltan Whitney MD;  Location:  MAIN OR;  Service: Urology   • MI LITHOTRIPSY XTRCORP SHOCK WAVE Left 6/19/2017    Procedure: LITHROTRIPSY EXTRACORPORAL SHOCKWAVE (ESWL);  Surgeon: Zoltan Whitney MD;  Location: [facility];  Service: Urology   • MI NDSC WRST SURG  W/RLS TRANSVRS CARPL LIGM Left 2019    Procedure: ENDOSCOPIC CARPAL TUNNEL RELEASE;  Surgeon: Kana Hanson MD;  Location: [facility];  Service: Orthopedics     Family History   Problem Relation Age of Onset   • Bone cancer Father    • Prostate cancer Maternal Grandfather    • Cancer Family    • Hypertension Family      Social History     Tobacco Use   • Smoking status: Former     Current packs/day: 0.00     Types: Cigarettes     Quit date:      Years since quittin.0   • Smokeless tobacco: Former     Types: Chew   • Tobacco comments:     Former Smoker as per allscripts   Vaping Use   • Vaping status: Never Used   Substance and Sexual Activity   • Alcohol use: Yes     Alcohol/week: 1.0 standard drink of alcohol     Types: 1 Cans of beer per week     Comment: socially   • Drug use: No   • Sexual activity: Not on file     Current Outpatient Medications on File Prior to Visit   Medication Sig   • allopurinol (ZYLOPRIM) 300 mg tablet Take 1 tablet (300 mg total) by mouth daily   • amLODIPine (NORVASC) 5 mg tablet Take 2 tablets (10 mg total) by mouth daily   • atorvastatin (LIPITOR) 80 mg tablet Take 1 tablet (80 mg total) by mouth daily   • Blood Glucose Monitoring Suppl (ONE TOUCH ULTRA 2) w/Device KIT 1 kit by Does not apply route daily   • Choline Fenofibrate (Fenofibric Acid) 135 MG CPDR Take 1 capsule (135 mg total) by mouth daily   • dulaglutide (Trulicity) 3 MG/0.5ML injection Inject 0.5 mL (3 mg total) under the skin every 7 days   • Dulaglutide (Trulicity) 3 MG/0.5ML SOAJ Inject 3 mg under the skin once a week   • Empagliflozin (Jardiance) 10 MG TABS tablet Take 1 tablet (10 mg total) by mouth daily   • glucose blood test strip Use 1 each daily   • ibuprofen (MOTRIN) 600 mg tablet Take 1 tablet (600 mg total) by mouth every 8 (eight) hours   • Icosapent Ethyl 1 g CAPS TAKE 1 CAPSULE TWICE A DAY   • indomethacin (INDOCIN) 50 mg capsule Take 1 capsule twice a day as needed for mild pain   •  "losartan-hydrochlorothiazide (HYZAAR) 100-25 MG per tablet Take 1 tablet by mouth daily   • metFORMIN (GLUCOPHAGE-XR) 500 mg 24 hr tablet Take 2 tablets (1,000 mg total) by mouth 2 (two) times a day with meals   • methocarbamol (ROBAXIN) 750 mg tablet TAKE 1 TABLET EVERY 6 HOURS AS NEEDED FOR MUSCLE SPASMS   • ONETOUCH DELICA LANCETS 33G MISC 1 Units by Does not apply route daily   • sildenafil (VIAGRA) 100 mg tablet Take 1 tablet (100 mg total) by mouth as needed for erectile dysfunction   • tamsulosin (FLOMAX) 0.4 mg TAKE 1 CAPSULE DAILY WITH DINNER     No Known Allergies  Immunization History   Administered Date(s) Administered   • COVID-19 PFIZER VACCINE 0.3 ML IM 04/05/2021, 04/26/2021, 11/23/2021   • COVID-19 Pfizer Vac BIVALENT Dwight-sucrose 12 Yr+ IM 12/07/2022   • INFLUENZA 09/14/2021, 10/07/2022, 09/12/2023   • Influenza Injectable, MDCK, Preservative Free, Quadrivalent, 0.5 mL 10/24/2018, 10/16/2019   • Influenza, recombinant, quadrivalent,injectable, preservative free 10/06/2020     Objective   /84   Pulse 80   Temp (!) 97.4 °F (36.3 °C)   Ht 6' 2\" (1.88 m)   Wt (!) 142 kg (314 lb)   SpO2 95%   BMI 40.32 kg/m²     Physical Exam  Vitals and nursing note reviewed.   Constitutional:       General: He is not in acute distress.     Appearance: He is well-developed.   HENT:      Head: Normocephalic and atraumatic.   Cardiovascular:      Rate and Rhythm: Normal rate and regular rhythm.      Heart sounds: No murmur heard.  Pulmonary:      Effort: Pulmonary effort is normal. No respiratory distress.      Breath sounds: Normal breath sounds. No wheezing, rhonchi or rales.   Musculoskeletal:         General: No swelling.      Cervical back: Neck supple.   Skin:     General: Skin is warm and dry.      Capillary Refill: Capillary refill takes less than 2 seconds.   Neurological:      Mental Status: He is alert.         "

## 2025-01-28 NOTE — ASSESSMENT & PLAN NOTE
BP at goal, continue current regimen  Lab Results   Component Value Date    HGBA1C 8.1 (A) 01/28/2025

## 2025-01-28 NOTE — ASSESSMENT & PLAN NOTE
A1c increased, previously controlled on this regimen  Lower carbs in diet, more CV exercise  Recheck A1c in 3 months  Lab Results   Component Value Date    HGBA1C 8.1 (A) 01/28/2025       Orders:  •  POCT hemoglobin A1c  •  Comprehensive metabolic panel; Future  •  CBC and differential; Future  •  Albumin / creatinine urine ratio; Future  •  Lipid Panel with Direct LDL reflex; Future

## 2025-02-04 ENCOUNTER — ANESTHESIA EVENT (OUTPATIENT)
Dept: PERIOP | Facility: HOSPITAL | Age: 50
End: 2025-02-04
Payer: COMMERCIAL

## 2025-02-04 NOTE — PRE-PROCEDURE INSTRUCTIONS
Pre-Surgery Instructions:   Medication Instructions    allopurinol (ZYLOPRIM) 300 mg tablet Do not take day of surgery; continue as prescribed excluding DOS - HS    amLODIPine (NORVASC) 5 mg tablet Do not take day of surgery; continue as prescribed excluding DOS - takes HS    atorvastatin (LIPITOR) 80 mg tablet Take Day of Surgery; unless usually taken at night     Choline Fenofibrate (Fenofibric Acid) 135 MG CPDR Do not take day of surgery; continue as prescribed excluding DOS     Dulaglutide (Trulicity) 3 MG/0.5ML SOAJ Hold foe 1 week. Last dose 2/3    Empagliflozin (Jardiance) 10 MG TABS tablet Hold for 4 days- last dose 2/6    ibuprofen (MOTRIN) 600 mg tablet Avoid 1 week prior to surgery      Icosapent Ethyl 1 g CAPS Avoid 1 week prior to surgery      indomethacin (INDOCIN) 50 mg capsule Avoid 1 week prior to surgery      losartan-hydrochlorothiazide (HYZAAR) 100-25 MG per tablet Do not take day of surgery; continue as prescribed excluding DOS     metFORMIN (GLUCOPHAGE-XR) 500 mg 24 hr tablet Do not take day of surgery; continue as prescribed excluding DOS     methocarbamol (ROBAXIN) 750 mg tablet Take Day of Surgery; unless usually taken at night -PRN    sildenafil (VIAGRA) 100 mg tablet Do not take day of surgery; continue as prescribed excluding DOS -PRN    tamsulosin (FLOMAX) 0.4 mg Do not take day of surgery; continue as prescribed excluding DOS -takes HS    terbinafine (LamISIL) 250 mg tablet Take Day of Surgery; unless usually taken at night     Medication instructions for day surgery reviewed. Please use only a sip of water to take your instructed medications. Avoid all over the counter vitamins, supplements and NSAIDS for one week prior to surgery per anesthesia guidelines. Tylenol is ok to take as needed.     You will receive a call one business day prior to surgery with an arrival time and hospital directions. If your surgery is scheduled on a Monday, the hospital will be calling you on the Friday  prior to your surgery. If you have not heard from anyone by 8pm, please call the hospital supervisor through the hospital  at [phone]. ([place] [phone] or [place] [phone]).    Do not eat or drink anything after midnight the night before your surgery, including candy, mints, lifesavers, or chewing gum. Do not drink alcohol 24hrs before your surgery. Try not to smoke at least 24hrs before your surgery.       Follow the pre surgery showering instructions as listed in the “My Surgical Experience Booklet” or otherwise provided by your surgeon's office. Do not use a blade to shave the surgical area 1 week before surgery. It is okay to use a clean electric clippers up to 24 hours before surgery. Do not apply any lotions, creams, including makeup, cologne, deodorant, or perfumes after showering on the day of your surgery. Do not use dry shampoo, hair spray, hair gel, or any type of hair products.     No contact lenses, eye make-up, or artificial eyelashes. Remove nail polish, including gel polish, and any artificial, gel, or acrylic nails if possible. Remove all jewelry including rings and body piercing jewelry.     Wear causal clothing that is easy to take on and off. Consider your type of surgery.    Keep any valuables, jewelry, piercings at home. Please bring any specially ordered equipment (sling, braces) if indicated.    Arrange for a responsible person to drive you to and from the hospital on the day of your surgery. Please confirm the visitor policy for the day of your procedure when you receive your phone call with an arrival time.     Call the surgeon's office with any new illnesses, exposures, or additional questions prior to surgery.    Please reference your “My Surgical Experience Booklet” for additional information to prepare for your upcoming surgery.

## 2025-02-08 NOTE — ANESTHESIA PREPROCEDURE EVALUATION
Procedure:  GANGLION CYST EXCISION RIGHT THUMB (Right: Thumb)    Relevant Problems   CARDIO   (+) Hyperlipidemia associated with type 2 diabetes mellitus  (HCC)   (+) Hypertension complicating diabetes (HCC)      ENDO   (+) Type 2 diabetes mellitus with obesity  (HCC)   (+) Type 2 diabetes mellitus without complication, without long-term current use of insulin (HCC)      GI/HEPATIC   (+) Fatty liver      /RENAL   (+) Renal calculus, left      MUSCULOSKELETAL   (+) Acute left-sided low back pain without sciatica   (+) Gout      PULMONARY   (+) SANCHO (obstructive sleep apnea)      SANCHO CPAP    BMI 40    Physical Exam    Airway    Mallampati score: II  TM Distance: >3 FB  Neck ROM: full     Dental       Cardiovascular  Cardiovascular exam normal    Pulmonary  Pulmonary exam normal     Other Findings        Anesthesia Plan  ASA Score- 3     Anesthesia Type- IV sedation with anesthesia with ASA Monitors.         Additional Monitors:     Airway Plan:     Comment: GA LMA backup.       Plan Factors-    Chart reviewed.   Existing labs reviewed. Patient summary reviewed.                  Induction- intravenous.    Postoperative Plan-         Informed Consent- Anesthetic plan and risks discussed with patient.  I personally reviewed this patient with the CRNA. Discussed and agreed on the Anesthesia Plan with the CRNA..      NPO Status:  No vitals data found for the desired time range.

## 2025-02-10 ENCOUNTER — HOSPITAL ENCOUNTER (OUTPATIENT)
Facility: HOSPITAL | Age: 50
Setting detail: OUTPATIENT SURGERY
Discharge: HOME | End: 2025-02-10
Attending: ORTHOPAEDIC SURGERY | Admitting: ORTHOPAEDIC SURGERY
Payer: COMMERCIAL

## 2025-02-10 ENCOUNTER — ANESTHESIA (OUTPATIENT)
Dept: PERIOP | Facility: HOSPITAL | Age: 50
End: 2025-02-10
Payer: COMMERCIAL

## 2025-02-10 VITALS
WEIGHT: 314 LBS | HEART RATE: 73 BPM | OXYGEN SATURATION: 95 % | BODY MASS INDEX: 40.3 KG/M2 | SYSTOLIC BLOOD PRESSURE: 143 MMHG | DIASTOLIC BLOOD PRESSURE: 72 MMHG | RESPIRATION RATE: 16 BRPM | TEMPERATURE: 97.5 F | HEIGHT: 74 IN

## 2025-02-10 DIAGNOSIS — M67.40 GANGLION CYST: Primary | ICD-10-CM

## 2025-02-10 DIAGNOSIS — Z01.812 PRE-OPERATIVE LABORATORY EXAMINATION: ICD-10-CM

## 2025-02-10 LAB
GLUCOSE SERPL-MCNC: 213 MG/DL (ref 65–140)
GLUCOSE SERPL-MCNC: 255 MG/DL (ref 65–140)

## 2025-02-10 PROCEDURE — NC001 PR NO CHARGE: Performed by: ORTHOPAEDIC SURGERY

## 2025-02-10 PROCEDURE — 11423 EXC H-F-NK-SP B9+MARG 2.1-3: CPT | Performed by: PHYSICIAN ASSISTANT

## 2025-02-10 PROCEDURE — 11423 EXC H-F-NK-SP B9+MARG 2.1-3: CPT | Performed by: ORTHOPAEDIC SURGERY

## 2025-02-10 PROCEDURE — C1758 CATHETER, URETERAL: HCPCS | Performed by: ORTHOPAEDIC SURGERY

## 2025-02-10 PROCEDURE — 88305 TISSUE EXAM BY PATHOLOGIST: CPT | Performed by: PATHOLOGY

## 2025-02-10 PROCEDURE — 82948 REAGENT STRIP/BLOOD GLUCOSE: CPT

## 2025-02-10 RX ORDER — CHLORHEXIDINE GLUCONATE ORAL RINSE 1.2 MG/ML
15 SOLUTION DENTAL ONCE
Status: COMPLETED | OUTPATIENT
Start: 2025-02-10 | End: 2025-02-10

## 2025-02-10 RX ORDER — MIDAZOLAM HYDROCHLORIDE 2 MG/2ML
INJECTION, SOLUTION INTRAMUSCULAR; INTRAVENOUS AS NEEDED
Status: DISCONTINUED | OUTPATIENT
Start: 2025-02-10 | End: 2025-02-10

## 2025-02-10 RX ORDER — SODIUM CHLORIDE, SODIUM LACTATE, POTASSIUM CHLORIDE, CALCIUM CHLORIDE 600; 310; 30; 20 MG/100ML; MG/100ML; MG/100ML; MG/100ML
INJECTION, SOLUTION INTRAVENOUS CONTINUOUS PRN
Status: DISCONTINUED | OUTPATIENT
Start: 2025-02-10 | End: 2025-02-10

## 2025-02-10 RX ORDER — TRAMADOL HYDROCHLORIDE 50 MG/1
50 TABLET ORAL EVERY 6 HOURS PRN
Status: DISCONTINUED | OUTPATIENT
Start: 2025-02-10 | End: 2025-02-10 | Stop reason: HOSPADM

## 2025-02-10 RX ORDER — ONDANSETRON 2 MG/ML
INJECTION INTRAMUSCULAR; INTRAVENOUS AS NEEDED
Status: DISCONTINUED | OUTPATIENT
Start: 2025-02-10 | End: 2025-02-10

## 2025-02-10 RX ORDER — ACETAMINOPHEN 325 MG/1
975 TABLET ORAL EVERY 8 HOURS
Status: DISCONTINUED | OUTPATIENT
Start: 2025-02-10 | End: 2025-02-10 | Stop reason: HOSPADM

## 2025-02-10 RX ORDER — ONDANSETRON 2 MG/ML
4 INJECTION INTRAMUSCULAR; INTRAVENOUS EVERY 6 HOURS PRN
Status: DISCONTINUED | OUTPATIENT
Start: 2025-02-10 | End: 2025-02-10 | Stop reason: HOSPADM

## 2025-02-10 RX ORDER — SODIUM CHLORIDE, SODIUM LACTATE, POTASSIUM CHLORIDE, CALCIUM CHLORIDE 600; 310; 30; 20 MG/100ML; MG/100ML; MG/100ML; MG/100ML
125 INJECTION, SOLUTION INTRAVENOUS CONTINUOUS
Status: DISCONTINUED | OUTPATIENT
Start: 2025-02-10 | End: 2025-02-10 | Stop reason: HOSPADM

## 2025-02-10 RX ORDER — KETAMINE HCL IN NACL, ISO-OSM 100MG/10ML
SYRINGE (ML) INJECTION AS NEEDED
Status: DISCONTINUED | OUTPATIENT
Start: 2025-02-10 | End: 2025-02-10

## 2025-02-10 RX ORDER — ONDANSETRON 2 MG/ML
4 INJECTION INTRAMUSCULAR; INTRAVENOUS ONCE AS NEEDED
Status: DISCONTINUED | OUTPATIENT
Start: 2025-02-10 | End: 2025-02-10 | Stop reason: HOSPADM

## 2025-02-10 RX ORDER — LIDOCAINE HYDROCHLORIDE 10 MG/ML
INJECTION, SOLUTION EPIDURAL; INFILTRATION; INTRACAUDAL; PERINEURAL AS NEEDED
Status: DISCONTINUED | OUTPATIENT
Start: 2025-02-10 | End: 2025-02-10 | Stop reason: HOSPADM

## 2025-02-10 RX ORDER — FENTANYL CITRATE 50 UG/ML
INJECTION, SOLUTION INTRAMUSCULAR; INTRAVENOUS AS NEEDED
Status: DISCONTINUED | OUTPATIENT
Start: 2025-02-10 | End: 2025-02-10

## 2025-02-10 RX ORDER — PROPOFOL 10 MG/ML
INJECTION, EMULSION INTRAVENOUS CONTINUOUS PRN
Status: DISCONTINUED | OUTPATIENT
Start: 2025-02-10 | End: 2025-02-10

## 2025-02-10 RX ORDER — TRAMADOL HYDROCHLORIDE 50 MG/1
50 TABLET ORAL EVERY 6 HOURS PRN
Qty: 14 TABLET | Refills: 0 | Status: SHIPPED | OUTPATIENT
Start: 2025-02-10 | End: 2025-02-20

## 2025-02-10 RX ORDER — ACETAMINOPHEN 325 MG/1
975 TABLET ORAL EVERY 8 HOURS
Qty: 63 TABLET | Refills: 0 | Status: SHIPPED | OUTPATIENT
Start: 2025-02-10 | End: 2025-02-17

## 2025-02-10 RX ORDER — CHLORHEXIDINE GLUCONATE 40 MG/ML
SOLUTION TOPICAL DAILY PRN
Status: DISCONTINUED | OUTPATIENT
Start: 2025-02-10 | End: 2025-02-10 | Stop reason: HOSPADM

## 2025-02-10 RX ORDER — FENTANYL CITRATE/PF 50 MCG/ML
25 SYRINGE (ML) INJECTION
Status: COMPLETED | OUTPATIENT
Start: 2025-02-10 | End: 2025-02-10

## 2025-02-10 RX ADMIN — PROPOFOL 30 MG: 10 INJECTION, EMULSION INTRAVENOUS at 07:36

## 2025-02-10 RX ADMIN — FENTANYL CITRATE 25 MCG: 50 INJECTION INTRAMUSCULAR; INTRAVENOUS at 08:29

## 2025-02-10 RX ADMIN — PROPOFOL 30 MG: 10 INJECTION, EMULSION INTRAVENOUS at 07:33

## 2025-02-10 RX ADMIN — SODIUM CHLORIDE, SODIUM LACTATE, POTASSIUM CHLORIDE, AND CALCIUM CHLORIDE: .6; .31; .03; .02 INJECTION, SOLUTION INTRAVENOUS at 06:42

## 2025-02-10 RX ADMIN — PROPOFOL 30 MG: 10 INJECTION, EMULSION INTRAVENOUS at 08:02

## 2025-02-10 RX ADMIN — Medication 20 MG: at 08:04

## 2025-02-10 RX ADMIN — Medication 10 MG: at 07:56

## 2025-02-10 RX ADMIN — FENTANYL CITRATE 50 MCG: 50 INJECTION INTRAMUSCULAR; INTRAVENOUS at 07:40

## 2025-02-10 RX ADMIN — CHLORHEXIDINE GLUCONATE 15 ML: 1.2 SOLUTION ORAL at 07:15

## 2025-02-10 RX ADMIN — Medication 3000 MG: at 07:32

## 2025-02-10 RX ADMIN — Medication 20 MG: at 07:43

## 2025-02-10 RX ADMIN — TRAMADOL HYDROCHLORIDE 50 MG: 50 TABLET, COATED ORAL at 09:04

## 2025-02-10 RX ADMIN — FENTANYL CITRATE 25 MCG: 50 INJECTION INTRAMUSCULAR; INTRAVENOUS at 08:39

## 2025-02-10 RX ADMIN — MIDAZOLAM 2 MG: 1 INJECTION INTRAMUSCULAR; INTRAVENOUS at 07:31

## 2025-02-10 RX ADMIN — PROPOFOL 100 MCG/KG/MIN: 10 INJECTION, EMULSION INTRAVENOUS at 07:32

## 2025-02-10 RX ADMIN — FENTANYL CITRATE 50 MCG: 50 INJECTION INTRAMUSCULAR; INTRAVENOUS at 07:31

## 2025-02-10 RX ADMIN — ONDANSETRON 4 MG: 2 INJECTION INTRAMUSCULAR; INTRAVENOUS at 08:15

## 2025-02-10 RX ADMIN — FENTANYL CITRATE 25 MCG: 50 INJECTION INTRAMUSCULAR; INTRAVENOUS at 08:44

## 2025-02-10 RX ADMIN — FENTANYL CITRATE 25 MCG: 50 INJECTION INTRAMUSCULAR; INTRAVENOUS at 08:34

## 2025-02-10 NOTE — INTERVAL H&P NOTE
H&P reviewed. After examining the patient I find no changes in the patients condition since the H&P had been written.  Cardiovascular: Normal rate    Pulmonary: Effort normal  Abdomen: Soft, nontender, nondistended    Vitals:    02/10/25 0650   BP: 137/63   Pulse: 77   Resp: 19   Temp: 97.9 °F (36.6 °C)   SpO2: 95%

## 2025-02-10 NOTE — ANESTHESIA POSTPROCEDURE EVALUATION
Post-Op Assessment Note    CV Status:  Stable    Pain management: adequate       Mental Status:  Alert and awake   Hydration Status:  Euvolemic   PONV Controlled:  Controlled   Airway Patency:  Patent     Post Op Vitals Reviewed: Yes    No anethesia notable event occurred.    Staff: Anesthesiologist, CRNA           Last Filed PACU Vitals:  Vitals Value Taken Time   Temp 98.1 °F (36.7 °C) 02/10/25 0817   Pulse 72 02/10/25 0854   /66 02/10/25 0845   Resp 16 02/10/25 0845   SpO2 93 % 02/10/25 0854   Vitals shown include unfiled device data.    Modified Jagdish:     Vitals Value Taken Time   Activity 2 02/10/25 0845   Respiration 2 02/10/25 0845   Circulation 2 02/10/25 0845   Consciousness 2 02/10/25 0845   Oxygen Saturation 2 02/10/25 0845     Modified Jagdish Score: 10

## 2025-02-10 NOTE — ANESTHESIA POSTPROCEDURE EVALUATION
Post-Op Assessment Note    CV Status:  Stable  Pain Score: 0    Pain management: adequate       Mental Status:  Arousable   Hydration Status:  Stable   PONV Controlled:  None   Airway Patency:  Patent     Post Op Vitals Reviewed: Yes    No anethesia notable event occurred.    Staff: CRNA         Last Filed PACU Vitals:  Vitals Value Taken Time   Temp 98.1    Pulse 72    /67    Resp 26    SpO2 99%

## 2025-02-10 NOTE — OP NOTE
OPERATIVE REPORT  PATIENT NAME: Todd Causey    :  1975  MRN: [id number]  Pt Location: [facility] OR ROOM 01    SURGERY DATE: 2/10/2025    Surgeons and Role:     * Everardo Ugalde DO - Primary    Assistant: Rex Guardado PA-C    Preop Diagnosis:  Mass right thumb of approximately 2 x 2 cm and 1 cm deep    Post-Op Diagnosis Codes:  Mass right thumb of approximately 2 x 2 cm and 1 cm deep    Procedure(s):  Right -thumb excision of mass approximately 2 x 2 cm and 1 cm deep    Specimen(s):  ID Type Source Tests Collected by Time Destination   1 :  Tissue Ganglion Cyst TISSUE EXAM Everardo Ugalde DO 2/10/2025 0713    Mass    Estimated Blood Loss:   Minimal    Drains:  None    Anesthesia Type:   Local with IV sedation    Operative Indications:  Mass right thumb being approximately 2 x 2 cm and 1 cm deep    Operative Findings:  TT: 12      Complications:   None    Procedure and Technique:    The patient was probably identified and brought to the op room suite.  After successful induction of the anesthetic, a tourniquet was placed on the patient's right proximal arm.  The right upper extremity was then prepped and draped in the usual fashion    It was medically necessary that the physician assistant be in the room to aid in positioning and applying the appropriate amount of retraction on the patient.  A qualified resident was not available.  The physician assistant's role was very integral to the success of this case.  He assisted on positioning, draping, retraction soft tissue, retraction neurovascular bundles, assisted with removal of the mass, closure of the wound, and placement of the dressing    The surgical landmarks are outlined with a skin marker.  1% digital block occurred.  An Esmarch was used to exsanguinate the right upper extremity.  The turniquit was then inflated.  Using a #15 scalpel blade, a longitudinal incision was made directly over the mass around the IP joint of the thumb, the size of the mass was  approximately 2 x 2 cm and 1 cm deep.  It should be noted that the swelling and mass was directed adhered to the skin.  This cannot be undermined and the skin was ellipticized out over this region.  This layers first with down to subcutaneous fat layer.  Through further blunt dissection, the mass was then located, dissected off the operative field and sent for pathology.  It appeared to be getting less in nature the wound was then irrigated.  It was then closed with 4-0 nylon and dressed with ointment, Xeroform, 4 x 4's, and an Ace bandage.  There was no complications during procedure.  He was successfully awoken, transferred his hospital bed, and went to recovery in stable condition     I was present for the entire procedure., A qualified resident physician was not available., and A physician assistant was required during the procedure for retraction, tissue handling, dissection and suturing.    Patient Disposition:  PACU              SIGNATURE: Everardo Ugalde DO  DATE: February 10, 2025  TIME: 7:34 AM

## 2025-02-10 NOTE — DISCHARGE INSTR - AVS FIRST PAGE
POST-OPERATIVE INFORMATION  HAND SURGERY    Keep your hand elevated above heart level as much as possible during the first 48 hours after surgery to minimize swelling.   Exercise your fingers and elbow regularly by moving them in all directions. Try to make gentle fists or touch each finger to your thumb.  Do not remove the splint until you are seen in our office for follow up.  If the bandage begins to feel to tight, you may unwrap it and loosen it.  You should do this if you notice significant hand swelling.  You may change the dressing after 2 days or if it gets wet or dirty  You may shower after 2 days, but keep your hand and dressing dry.   Call our office if you experience pain not controlled by your medicine, develop a fever, experience increased drainage or redness around the incision site.  If a post-op follow up appointment is not arranged before you leave the hospital, call our office the day following surgery to schedule this appointment.

## 2025-02-14 PROCEDURE — 88305 TISSUE EXAM BY PATHOLOGIST: CPT | Performed by: PATHOLOGY

## 2025-02-25 ENCOUNTER — OFFICE VISIT (OUTPATIENT)
Dept: OBGYN CLINIC | Facility: CLINIC | Age: 50
End: 2025-02-25
Payer: COMMERCIAL

## 2025-02-25 VITALS — BODY MASS INDEX: 40.3 KG/M2 | HEIGHT: 74 IN | WEIGHT: 314 LBS

## 2025-02-25 DIAGNOSIS — Z98.890 S/P EXCISION OF GANGLION CYST: Primary | ICD-10-CM

## 2025-02-25 PROCEDURE — 99213 OFFICE O/P EST LOW 20 MIN: CPT | Performed by: ORTHOPAEDIC SURGERY

## 2025-02-25 NOTE — PROGRESS NOTES
Assessment & Plan  S/P excision of ganglion cyst       Patient is 2 weeks s/p cyst removal of the right thumb.. Patient is progressing as expected post-operatively. Sutures were removed at time of visit, steri strips were applied. Patient can expose incision for hygiene purposes, but has been advised to avoid submersion. He will be seen in 4 weeks for strength and range of motion check. Patient expresses understanding and is in agreement with treatment plan.       The patient is doing quite well in regards to his cyst vision of his thumb.  Sutures removed.  Continue home exercise program.  Continue wound care.  Follow-up in a month for strength and motion check    Subjective:   Patient ID: Todd Causey  1975     HPI  Patient is a 49 y.o. male who presents for post-operative evaluation of his right thumb. The patient is approximately 2 weeks s/p excision of a cyst of the right thumb. The patient is doing well post-operatively. He denies pain. He has not had any limitations since surgery. He denies numbness, tingling, weakness, feelings of instability, fever, chills, or malaise.      The following portions of the patient's history were reviewed and updated as appropriate:  Past medical history, past surgical history, Family history, social history, current medications and allergies    Past Medical History:   Diagnosis Date    CPAP (continuous positive airway pressure) dependence     Diabetes (HCC)     Diabetes mellitus (HCC)     Gout     HTN (hypertension)     Hyperlipidemia     Kidney stone     RIGHT    Morbid obesity with BMI of 40.0-44.9, adult (HCC)     SANCHO on CPAP        Past Surgical History:   Procedure Laterality Date    GANGLION CYST EXCISION Left 2/19/2019    Procedure: WRIST GANGLION CYST EXCISION;  Surgeon: Kana Hanson MD;  Location: [facility];  Service: Orthopedics    DC CYSTO/URETERO W/LITHOTRIPSY &INDWELL STENT INSRT Right 5/1/2017    Procedure: CYSTOSCOPY URETEROSCOPY WITH LITHOTRIPSY HOLMIUM  LASER, RETROGRADE PYELOGRAM AND INSERTION STENT URETERAL;  Surgeon: Zoltan Whitney MD;  [facility];  Service: Urology    IN EXC LESION TDN SHTH/JT CAPSL HAND/FNGR Right 2/10/2025    Procedure: CYST/Mass EXCISION RIGHT THUMB;  Surgeon: Everardo Ugalde DO;  Location: [facility];  Service: Orthopedics    IN LITHOTRIPSY XTRCORP SHOCK WAVE Left 2017    Procedure: LITHROTRIPSY EXTRACORPORAL SHOCKWAVE (ESWL);  Surgeon: Zoltan Whitney MD;  [facility]: [facility];  Service: Urology    IN NDSC WRST SURG W/RLS TRANSVRS CARPL LIGM Left 2019    Procedure: ENDOSCOPIC CARPAL TUNNEL RELEASE;  Surgeon: Kana Hanson MD;  Location: [facility];  Service: Orthopedics       Family History   Problem Relation Age of Onset    Bone cancer Father     Prostate cancer Maternal Grandfather     Cancer Family     Hypertension Family        Social History     Socioeconomic History    Marital status: /Civil Union     Spouse name: None    Number of children: None    Years of education: None    Highest education level: None   Occupational History    None   Tobacco Use    Smoking status: Former     Current packs/day: 0.00     Types: Cigarettes     Quit date:      Years since quittin.1    Smokeless tobacco: Former     Types: Chew    Tobacco comments:     Former Smoker as per allscripts   Vaping Use    Vaping status: Never Used   Substance and Sexual Activity    Alcohol use: Yes     Alcohol/week: 1.0 standard drink of alcohol     Types: 1 Cans of beer per week     Comment: socially    Drug use: No    Sexual activity: None   Other Topics Concern    None   Social History Narrative    Daily Coffee Consumption    Daily Tea Consumption     Social Drivers of Health     Financial Resource Strain: Not on file   Food Insecurity: Not on file   Transportation Needs: Not on file   Physical Activity: Not on file   Stress: Not on file (2024)   Social Connections: Not on file   Intimate Partner Violence: Low Risk   (4/13/2021)    [facility]    Intimate Partner Violence     Insults You: Not on file     Threatens You: Not on file     Screams at You: Not on file     Physically Hurt: Not on file     Intimate Partner Violence Score: Not on file   Housing Stability: Not on file         Current Outpatient Medications:     allopurinol (ZYLOPRIM) 300 mg tablet, Take 1 tablet (300 mg total) by mouth daily (Patient taking differently: Take 300 mg by mouth daily at bedtime), Disp: 90 tablet, Rfl: 1    amLODIPine (NORVASC) 5 mg tablet, Take 2 tablets (10 mg total) by mouth daily (Patient taking differently: Take 10 mg by mouth daily at bedtime), Disp: 90 tablet, Rfl: 1    atorvastatin (LIPITOR) 80 mg tablet, Take 1 tablet (80 mg total) by mouth daily, Disp: 90 tablet, Rfl: 1    Blood Glucose Monitoring Suppl (ONE TOUCH ULTRA 2) w/Device KIT, 1 kit by Does not apply route daily, Disp: , Rfl:     Choline Fenofibrate (Fenofibric Acid) 135 MG CPDR, Take 1 capsule (135 mg total) by mouth daily (Patient taking differently: Take 1 capsule by mouth daily at bedtime), Disp: 90 capsule, Rfl: 1    Empagliflozin (Jardiance) 10 MG TABS tablet, Take 1 tablet (10 mg total) by mouth daily, Disp: 90 tablet, Rfl: 1    glucose blood test strip, Use 1 each daily, Disp: 100 each, Rfl: 5    ibuprofen (MOTRIN) 600 mg tablet, Take 1 tablet (600 mg total) by mouth every 8 (eight) hours (Patient taking differently: Take 600 mg by mouth every 8 (eight) hours as needed), Disp: 270 tablet, Rfl: 1    Icosapent Ethyl 1 g CAPS, TAKE 1 CAPSULE TWICE A DAY, Disp: 180 capsule, Rfl: 3    indomethacin (INDOCIN) 50 mg capsule, Take 1 capsule twice a day as needed for mild pain, Disp: 60 capsule, Rfl: 5    losartan-hydrochlorothiazide (HYZAAR) 100-25 MG per tablet, Take 1 tablet by mouth daily, Disp: 90 tablet, Rfl: 1    metFORMIN (GLUCOPHAGE-XR) 500 mg 24 hr tablet, Take 2 tablets (1,000 mg total) by mouth 2 (two) times a day with meals, Disp:  "360 tablet, Rfl: 1    methocarbamol (ROBAXIN) 750 mg tablet, TAKE 1 TABLET EVERY 6 HOURS AS NEEDED FOR MUSCLE SPASMS, Disp: 30 tablet, Rfl: 1    ONETOUCH DELICA LANCETS 33G MISC, 1 Units by Does not apply route daily, Disp: , Rfl:     sildenafil (VIAGRA) 100 mg tablet, Take 1 tablet (100 mg total) by mouth as needed for erectile dysfunction, Disp: 30 tablet, Rfl: 3    tamsulosin (FLOMAX) 0.4 mg, TAKE 1 CAPSULE DAILY WITH DINNER, Disp: 90 capsule, Rfl: 1    terbinafine (LamISIL) 250 mg tablet, Take 1 tablet (250 mg total) by mouth daily, Disp: 84 tablet, Rfl: 0    dulaglutide (Trulicity) 3 MG/0.5ML injection, Inject 0.5 mL (3 mg total) under the skin every 7 days, Disp: 6 mL, Rfl: 1    Dulaglutide (Trulicity) 3 MG/0.5ML SOAJ, Inject 3 mg under the skin once a week (Patient taking differently: Inject 3 mg under the skin once a week Mondays), Disp: 6 mL, Rfl: 0    No Known Allergies    Review of Systems   Constitutional:  Negative for chills and fever.   HENT:  Negative for ear pain and sore throat.    Eyes:  Negative for pain and visual disturbance.   Respiratory:  Negative for cough and shortness of breath.    Cardiovascular:  Negative for chest pain and palpitations.   Gastrointestinal:  Negative for abdominal pain and vomiting.   Genitourinary:  Negative for dysuria and hematuria.   Musculoskeletal:  Negative for arthralgias and back pain.   Skin:  Negative for color change and rash.   Neurological:  Negative for seizures and syncope.   All other systems reviewed and are negative.       Objective:  Ht 6' 2\" (1.88 m)   Wt (!) 142 kg (314 lb)   BMI 40.32 kg/m²     Ortho Exam  right Hand -    Patient presents with no obvious anatomical deformity  Incision healing well with no signs of erythema, dehiscence, or infection  No soft tissue swelling or effusion noted  Full FDS, FDP, extensor mechanisms are intact  Demonstrates normal wrist, elbow, and shoulder motion  Forearm compartments are soft and supple  2+ distal " radial pulse with brisk capillary refill to the fingers  Radial, median, and ulnar motor and sensory distribution intact  Sensations light to touch intact distally      Physical Exam  HENT:      Head: Normocephalic and atraumatic.      Nose: Nose normal.   Eyes:      Conjunctiva/sclera: Conjunctivae normal.   Cardiovascular:      Rate and Rhythm: Normal rate.   Pulmonary:      Effort: Pulmonary effort is normal.   Musculoskeletal:      Cervical back: Neck supple.   Skin:     General: Skin is warm and dry.      Capillary Refill: Capillary refill takes less than 2 seconds.   Neurological:      Mental Status: He is alert and oriented to person, place, and time.   Psychiatric:         Mood and Affect: Mood normal.         Behavior: Behavior normal.          Diagnostic Test Review:  No new imaging at time of visit.     Procedures   None performed.    Scribe Attestation      I,:   am acting as a scribe while in the presence of the attending physician.:       I,:   personally performed the services described in this documentation    as scribed in my presence.:

## 2025-03-25 ENCOUNTER — OFFICE VISIT (OUTPATIENT)
Dept: OBGYN CLINIC | Facility: CLINIC | Age: 50
End: 2025-03-25

## 2025-03-25 VITALS — HEIGHT: 74 IN | WEIGHT: 314 LBS | BODY MASS INDEX: 40.3 KG/M2

## 2025-03-25 DIAGNOSIS — Z98.890 S/P EXCISION OF GANGLION CYST: Primary | ICD-10-CM

## 2025-03-25 PROCEDURE — 99024 POSTOP FOLLOW-UP VISIT: CPT | Performed by: ORTHOPAEDIC SURGERY

## 2025-03-25 NOTE — PROGRESS NOTES
Assessment & Plan  S/P excision of ganglion cyst  The patient is 6 weeks s/p removal of cyst on right thumb. He has progressed well post-operatively. He has normal range of motion and denies pain in the thumb. He is happy with th outcome. He will follow up on an as needed basis. The patient expresses understanding and is in agreement with today's treatment plan. They may contact the office with any question or concerns.     The patient is doing quite well from his cyst excision of his thumb.  The cyst is now gone.  Continue home exercise program.  Follow-up on an as-needed basis.  If his condition changes, he would not hesitate to let us know             Subjective:   Patient ID: Todd Causey  1975     HPI  Patient is a 49 y.o. male who presents for post-op evaluation of his right thumb. He is now 6 weeks s/p excision of a cyst of the right thumb. The patient continues to do well post operatively. He denies pain in the thumb.     The following portions of the patient's history were reviewed and updated as appropriate:  Past medical history, past surgical history, Family history, social history, current medications and allergies    Past Medical History:   Diagnosis Date    CPAP (continuous positive airway pressure) dependence     Diabetes (HCC)     Diabetes mellitus (HCC)     Gout     HTN (hypertension)     Hyperlipidemia     Kidney stone     RIGHT    Morbid obesity with BMI of 40.0-44.9, adult (HCC)     SANCHO on CPAP        Past Surgical History:   Procedure Laterality Date    GANGLION CYST EXCISION Left 2/19/2019    Procedure: WRIST GANGLION CYST EXCISION;  Surgeon: Kana Hanson MD;  Location: [facility];  Service: Orthopedics    IN CYSTO/URETERO W/LITHOTRIPSY &INDWELL STENT INSRT Right 5/1/2017    Procedure: CYSTOSCOPY URETEROSCOPY WITH LITHOTRIPSY HOLMIUM LASER, RETROGRADE PYELOGRAM AND INSERTION STENT URETERAL;  Surgeon: Zoltan Whitney MD;  Location:  MAIN OR;  Service: Urology    IN EXC LESION ALOKN KARISSA/BRUNILDA  CAPSL HAND/FNGR Right 2/10/2025    Procedure: CYST/Mass EXCISION RIGHT THUMB;  Surgeon: Everardo Ugalde DO;  Location: [facility];  Service: Orthopedics    VT LITHOTRIPSY XTRCORP SHOCK WAVE Left 2017    Procedure: LITHROTRIPSY EXTRACORPORAL SHOCKWAVE (ESWL);  Surgeon: Zoltan Whitney MD;  [facility]: [facility];  Service: Urology    VT NDSC WRST SURG W/RLS TRANSVRS CARPL LIGM Left 2019    Procedure: ENDOSCOPIC CARPAL TUNNEL RELEASE;  Surgeon: Kana Hanson MD;  [facility]: [facility];  Service: Orthopedics       Family History   Problem Relation Age of Onset    Bone cancer Father     Prostate cancer Maternal Grandfather     Cancer Family     Hypertension Family        Social History     Socioeconomic History    Marital status: /Civil Union     Spouse name: None    Number of children: None    Years of education: None    Highest education level: None   Occupational History    None   Tobacco Use    Smoking status: Former     Current packs/day: 0.00     Types: Cigarettes     Quit date:      Years since quittin.2    Smokeless tobacco: Former     Types: Chew    Tobacco comments:     Former Smoker as per allscripts   Vaping Use    Vaping status: Never Used   Substance and Sexual Activity    Alcohol use: Yes     Alcohol/week: 1.0 standard drink of alcohol     Types: 1 Cans of beer per week     Comment: socially    Drug use: No    Sexual activity: None   Other Topics Concern    None   Social History Narrative    Daily Coffee Consumption    Daily Tea Consumption     Social Drivers of Health     Financial Resource Strain: Not on file   Food Insecurity: Not on file   Transportation Needs: Not on file   Physical Activity: Not on file   Stress: Not on file (2024)   Social Connections: Not on file   Intimate Partner Violence: Low Risk  (2021)    [facility]    Intimate Partner Violence     Insults You: Not on file     Threatens You: Not on file     Screams at  You: Not on file     Physically Hurt: Not on file     Intimate Partner Violence Score: Not on file   Housing Stability: Not on file         Current Outpatient Medications:     allopurinol (ZYLOPRIM) 300 mg tablet, Take 1 tablet (300 mg total) by mouth daily (Patient taking differently: Take 300 mg by mouth daily at bedtime), Disp: 90 tablet, Rfl: 1    amLODIPine (NORVASC) 5 mg tablet, Take 2 tablets (10 mg total) by mouth daily (Patient taking differently: Take 10 mg by mouth daily at bedtime), Disp: 90 tablet, Rfl: 1    atorvastatin (LIPITOR) 80 mg tablet, Take 1 tablet (80 mg total) by mouth daily, Disp: 90 tablet, Rfl: 1    Blood Glucose Monitoring Suppl (ONE TOUCH ULTRA 2) w/Device KIT, 1 kit by Does not apply route daily, Disp: , Rfl:     Choline Fenofibrate (Fenofibric Acid) 135 MG CPDR, Take 1 capsule (135 mg total) by mouth daily (Patient taking differently: Take 1 capsule by mouth daily at bedtime), Disp: 90 capsule, Rfl: 1    Dulaglutide (Trulicity) 3 MG/0.5ML SOAJ, Inject 3 mg under the skin once a week (Patient taking differently: Inject 3 mg under the skin once a week Mondays), Disp: 6 mL, Rfl: 0    Empagliflozin (Jardiance) 10 MG TABS tablet, Take 1 tablet (10 mg total) by mouth daily, Disp: 90 tablet, Rfl: 1    glucose blood test strip, Use 1 each daily, Disp: 100 each, Rfl: 5    Icosapent Ethyl 1 g CAPS, TAKE 1 CAPSULE TWICE A DAY, Disp: 180 capsule, Rfl: 3    indomethacin (INDOCIN) 50 mg capsule, Take 1 capsule twice a day as needed for mild pain, Disp: 60 capsule, Rfl: 5    losartan-hydrochlorothiazide (HYZAAR) 100-25 MG per tablet, Take 1 tablet by mouth daily, Disp: 90 tablet, Rfl: 1    metFORMIN (GLUCOPHAGE-XR) 500 mg 24 hr tablet, Take 2 tablets (1,000 mg total) by mouth 2 (two) times a day with meals, Disp: 360 tablet, Rfl: 1    methocarbamol (ROBAXIN) 750 mg tablet, TAKE 1 TABLET EVERY 6 HOURS AS NEEDED FOR MUSCLE SPASMS, Disp: 30 tablet, Rfl: 1    ONETOUCH DELICA LANCETS 33G MISC, 1 Units by  "Does not apply route daily, Disp: , Rfl:     sildenafil (VIAGRA) 100 mg tablet, Take 1 tablet (100 mg total) by mouth as needed for erectile dysfunction, Disp: 30 tablet, Rfl: 3    tamsulosin (FLOMAX) 0.4 mg, TAKE 1 CAPSULE DAILY WITH DINNER, Disp: 90 capsule, Rfl: 1    dulaglutide (Trulicity) 3 MG/0.5ML injection, Inject 0.5 mL (3 mg total) under the skin every 7 days, Disp: 6 mL, Rfl: 1    ibuprofen (MOTRIN) 600 mg tablet, Take 1 tablet (600 mg total) by mouth every 8 (eight) hours (Patient taking differently: Take 600 mg by mouth every 8 (eight) hours as needed), Disp: 270 tablet, Rfl: 1    terbinafine (LamISIL) 250 mg tablet, Take 1 tablet (250 mg total) by mouth daily, Disp: 84 tablet, Rfl: 0    No Known Allergies    Review of Systems   Constitutional:  Negative for chills and fever.   HENT:  Negative for ear pain and sore throat.    Eyes:  Negative for pain and visual disturbance.   Respiratory:  Negative for cough and shortness of breath.    Cardiovascular:  Negative for chest pain and palpitations.   Gastrointestinal:  Negative for abdominal pain and vomiting.   Genitourinary:  Negative for dysuria and hematuria.   Musculoskeletal:  Negative for arthralgias and back pain.   Skin:  Negative for color change and rash.   Neurological:  Negative for seizures and syncope.   All other systems reviewed and are negative.       Objective:  Ht 6' 2\" (1.88 m)   Wt (!) 142 kg (314 lb)   BMI 40.32 kg/m²     Ortho Exam  right Hand -    Patient presents with no obvious anatomical deformity  Skin is warm and dry to touch with no signs of erythema, ecchymosis, or infection  No soft tissue swelling or effusion noted  No tenderness to palpate  Demonstrates normal wrist, elbow, and shoulder motion  Forearm compartments are soft and supple  2+ distal radial pulse with brisk capillary refill to the fingers  Radial, median, and ulnar motor and sensory distribution intact  Sensations light to touch intact distally      Physical " Exam  Vitals and nursing note reviewed.   Constitutional:       General: He is not in acute distress.     Appearance: He is well-developed.   HENT:      Head: Normocephalic and atraumatic.   Eyes:      Conjunctiva/sclera: Conjunctivae normal.   Cardiovascular:      Rate and Rhythm: Normal rate and regular rhythm.      Heart sounds: No murmur heard.  Pulmonary:      Effort: Pulmonary effort is normal. No respiratory distress.      Breath sounds: Normal breath sounds.   Abdominal:      Palpations: Abdomen is soft.      Tenderness: There is no abdominal tenderness.   Musculoskeletal:         General: No swelling.      Cervical back: Neck supple.   Skin:     General: Skin is warm and dry.      Capillary Refill: Capillary refill takes less than 2 seconds.   Neurological:      Mental Status: He is alert.   Psychiatric:         Mood and Affect: Mood normal.        Diagnostic Test Review:  No new images to review    Procedures   None performed at today's visit    Scribe Attestation      I,:  Brianne Kirkland am acting as a scribe while in the presence of the attending physician.:       I,:  Everardo Ugalde, DO personally performed the services described in this documentation    as scribed in my presence.:

## 2025-04-11 DIAGNOSIS — N40.1 BPH WITH OBSTRUCTION/LOWER URINARY TRACT SYMPTOMS: ICD-10-CM

## 2025-04-11 DIAGNOSIS — N13.8 BPH WITH OBSTRUCTION/LOWER URINARY TRACT SYMPTOMS: ICD-10-CM

## 2025-04-11 RX ORDER — TAMSULOSIN HYDROCHLORIDE 0.4 MG/1
CAPSULE ORAL
Qty: 90 CAPSULE | Refills: 1 | Status: SHIPPED | OUTPATIENT
Start: 2025-04-11

## 2025-04-14 DIAGNOSIS — E11.69 TYPE 2 DIABETES MELLITUS WITH OBESITY  (HCC): ICD-10-CM

## 2025-04-14 DIAGNOSIS — E66.9 TYPE 2 DIABETES MELLITUS WITH OBESITY  (HCC): ICD-10-CM

## 2025-04-15 RX ORDER — DULAGLUTIDE 3 MG/.5ML
3 INJECTION, SOLUTION SUBCUTANEOUS WEEKLY
Qty: 6 ML | Refills: 1 | Status: SHIPPED | OUTPATIENT
Start: 2025-04-15 | End: 2025-05-15

## 2025-04-29 ENCOUNTER — APPOINTMENT (OUTPATIENT)
Dept: LAB | Facility: CLINIC | Age: 50
End: 2025-04-29
Payer: COMMERCIAL

## 2025-04-29 ENCOUNTER — OFFICE VISIT (OUTPATIENT)
Dept: FAMILY MEDICINE CLINIC | Facility: CLINIC | Age: 50
End: 2025-04-29
Payer: COMMERCIAL

## 2025-04-29 VITALS
HEIGHT: 74 IN | TEMPERATURE: 97.4 F | OXYGEN SATURATION: 98 % | DIASTOLIC BLOOD PRESSURE: 88 MMHG | SYSTOLIC BLOOD PRESSURE: 134 MMHG | HEART RATE: 73 BPM | BODY MASS INDEX: 39.91 KG/M2 | WEIGHT: 311 LBS

## 2025-04-29 DIAGNOSIS — E11.9 TYPE 2 DIABETES MELLITUS WITHOUT COMPLICATION, WITHOUT LONG-TERM CURRENT USE OF INSULIN (HCC): ICD-10-CM

## 2025-04-29 DIAGNOSIS — E11.69 TYPE 2 DIABETES MELLITUS WITH OBESITY  (HCC): ICD-10-CM

## 2025-04-29 DIAGNOSIS — E78.2 MIXED HYPERLIPIDEMIA: ICD-10-CM

## 2025-04-29 DIAGNOSIS — Z12.5 SCREENING FOR PROSTATE CANCER: ICD-10-CM

## 2025-04-29 DIAGNOSIS — I10 HYPERTENSION COMPLICATING DIABETES (HCC): ICD-10-CM

## 2025-04-29 DIAGNOSIS — E11.9 TYPE 2 DIABETES MELLITUS WITHOUT COMPLICATION, WITHOUT LONG-TERM CURRENT USE OF INSULIN (HCC): Primary | ICD-10-CM

## 2025-04-29 DIAGNOSIS — E78.5 HYPERLIPIDEMIA ASSOCIATED WITH TYPE 2 DIABETES MELLITUS  (HCC): ICD-10-CM

## 2025-04-29 DIAGNOSIS — E11.69 HYPERLIPIDEMIA ASSOCIATED WITH TYPE 2 DIABETES MELLITUS  (HCC): ICD-10-CM

## 2025-04-29 DIAGNOSIS — E11.9 HYPERTENSION COMPLICATING DIABETES (HCC): ICD-10-CM

## 2025-04-29 DIAGNOSIS — Z00.00 HEALTH MAINTENANCE EXAMINATION: ICD-10-CM

## 2025-04-29 DIAGNOSIS — I10 ESSENTIAL HYPERTENSION: ICD-10-CM

## 2025-04-29 DIAGNOSIS — E66.9 TYPE 2 DIABETES MELLITUS WITH OBESITY  (HCC): ICD-10-CM

## 2025-04-29 LAB
ALBUMIN SERPL BCG-MCNC: 4.5 G/DL (ref 3.5–5)
ALP SERPL-CCNC: 80 U/L (ref 34–104)
ALT SERPL W P-5'-P-CCNC: 33 U/L (ref 7–52)
ANION GAP SERPL CALCULATED.3IONS-SCNC: 14 MMOL/L (ref 4–13)
AST SERPL W P-5'-P-CCNC: 19 U/L (ref 13–39)
BASOPHILS # BLD AUTO: 0.05 THOUSANDS/ÂΜL (ref 0–0.1)
BASOPHILS NFR BLD AUTO: 1 % (ref 0–1)
BILIRUB SERPL-MCNC: 0.91 MG/DL (ref 0.2–1)
BUN SERPL-MCNC: 14 MG/DL (ref 5–25)
CALCIUM SERPL-MCNC: 9.6 MG/DL (ref 8.4–10.2)
CHLORIDE SERPL-SCNC: 101 MMOL/L (ref 96–108)
CHOLEST SERPL-MCNC: 174 MG/DL (ref ?–200)
CO2 SERPL-SCNC: 24 MMOL/L (ref 21–32)
CREAT SERPL-MCNC: 0.85 MG/DL (ref 0.6–1.3)
CREAT UR-MCNC: 54.7 MG/DL
EOSINOPHIL # BLD AUTO: 0.16 THOUSAND/ÂΜL (ref 0–0.61)
EOSINOPHIL NFR BLD AUTO: 3 % (ref 0–6)
ERYTHROCYTE [DISTWIDTH] IN BLOOD BY AUTOMATED COUNT: 14.8 % (ref 11.6–15.1)
GFR SERPL CREATININE-BSD FRML MDRD: 100 ML/MIN/1.73SQ M
GLUCOSE P FAST SERPL-MCNC: 206 MG/DL (ref 65–99)
HCT VFR BLD AUTO: 46 % (ref 36.5–49.3)
HDLC SERPL-MCNC: 28 MG/DL
HGB BLD-MCNC: 15.2 G/DL (ref 12–17)
IMM GRANULOCYTES # BLD AUTO: 0.02 THOUSAND/UL (ref 0–0.2)
IMM GRANULOCYTES NFR BLD AUTO: 0 % (ref 0–2)
LDLC SERPL DIRECT ASSAY-MCNC: 51 MG/DL (ref 0–100)
LYMPHOCYTES # BLD AUTO: 1.48 THOUSANDS/ÂΜL (ref 0.6–4.47)
LYMPHOCYTES NFR BLD AUTO: 28 % (ref 14–44)
MCH RBC QN AUTO: 26 PG (ref 26.8–34.3)
MCHC RBC AUTO-ENTMCNC: 33 G/DL (ref 31.4–37.4)
MCV RBC AUTO: 79 FL (ref 82–98)
MICROALBUMIN UR-MCNC: 31.7 MG/L
MICROALBUMIN/CREAT 24H UR: 58 MG/G CREATININE (ref 0–30)
MONOCYTES # BLD AUTO: 0.53 THOUSAND/ÂΜL (ref 0.17–1.22)
MONOCYTES NFR BLD AUTO: 10 % (ref 4–12)
NEUTROPHILS # BLD AUTO: 3.12 THOUSANDS/ÂΜL (ref 1.85–7.62)
NEUTS SEG NFR BLD AUTO: 58 % (ref 43–75)
NRBC BLD AUTO-RTO: 0 /100 WBCS
PLATELET # BLD AUTO: 183 THOUSANDS/UL (ref 149–390)
PMV BLD AUTO: 10.9 FL (ref 8.9–12.7)
POTASSIUM SERPL-SCNC: 3.9 MMOL/L (ref 3.5–5.3)
PROT SERPL-MCNC: 7 G/DL (ref 6.4–8.4)
PSA SERPL-MCNC: 0.55 NG/ML (ref 0–4)
RBC # BLD AUTO: 5.84 MILLION/UL (ref 3.88–5.62)
SL AMB POCT HEMOGLOBIN AIC: 7.5 (ref ?–6.5)
SODIUM SERPL-SCNC: 139 MMOL/L (ref 135–147)
TRIGL SERPL-MCNC: 972 MG/DL (ref ?–150)
WBC # BLD AUTO: 5.36 THOUSAND/UL (ref 4.31–10.16)

## 2025-04-29 PROCEDURE — 80053 COMPREHEN METABOLIC PANEL: CPT

## 2025-04-29 PROCEDURE — 83036 HEMOGLOBIN GLYCOSYLATED A1C: CPT | Performed by: PHYSICIAN ASSISTANT

## 2025-04-29 PROCEDURE — 83721 ASSAY OF BLOOD LIPOPROTEIN: CPT

## 2025-04-29 PROCEDURE — 99396 PREV VISIT EST AGE 40-64: CPT | Performed by: PHYSICIAN ASSISTANT

## 2025-04-29 PROCEDURE — 85025 COMPLETE CBC W/AUTO DIFF WBC: CPT

## 2025-04-29 PROCEDURE — 36415 COLL VENOUS BLD VENIPUNCTURE: CPT

## 2025-04-29 PROCEDURE — G0103 PSA SCREENING: HCPCS

## 2025-04-29 PROCEDURE — 82043 UR ALBUMIN QUANTITATIVE: CPT

## 2025-04-29 PROCEDURE — 80061 LIPID PANEL: CPT

## 2025-04-29 PROCEDURE — 99214 OFFICE O/P EST MOD 30 MIN: CPT | Performed by: PHYSICIAN ASSISTANT

## 2025-04-29 PROCEDURE — 82570 ASSAY OF URINE CREATININE: CPT

## 2025-04-29 RX ORDER — AMLODIPINE BESYLATE 5 MG/1
10 TABLET ORAL DAILY
Qty: 90 TABLET | Refills: 1 | Status: SHIPPED | OUTPATIENT
Start: 2025-04-29

## 2025-04-29 RX ORDER — LOSARTAN POTASSIUM AND HYDROCHLOROTHIAZIDE 25; 100 MG/1; MG/1
1 TABLET ORAL DAILY
Qty: 90 TABLET | Refills: 1 | Status: SHIPPED | OUTPATIENT
Start: 2025-04-29

## 2025-04-29 RX ORDER — ATORVASTATIN CALCIUM 80 MG/1
80 TABLET, FILM COATED ORAL DAILY
Qty: 90 TABLET | Refills: 1 | Status: SHIPPED | OUTPATIENT
Start: 2025-04-29

## 2025-04-29 RX ORDER — METFORMIN HYDROCHLORIDE 500 MG/1
1000 TABLET, EXTENDED RELEASE ORAL 2 TIMES DAILY WITH MEALS
Qty: 360 TABLET | Refills: 1 | Status: SHIPPED | OUTPATIENT
Start: 2025-04-29 | End: 2025-10-26

## 2025-04-29 NOTE — ASSESSMENT & PLAN NOTE
At goal  Continue current regimen  Monitor renal function  Lab Results   Component Value Date    HGBA1C 8.1 (A) 01/28/2025

## 2025-04-29 NOTE — ASSESSMENT & PLAN NOTE
A1c improving  Continue current regimen  Lab Results   Component Value Date    HGBA1C 8.1 (A) 01/28/2025       Orders:  •  POCT hemoglobin A1c  •  Empagliflozin (Jardiance) 10 MG TABS tablet; Take 1 tablet (10 mg total) by mouth daily  •  metFORMIN (GLUCOPHAGE-XR) 500 mg 24 hr tablet; Take 2 tablets (1,000 mg total) by mouth 2 (two) times a day with meals

## 2025-04-29 NOTE — ASSESSMENT & PLAN NOTE
Due for FLP, previously ordered  Continue statin, fibrate, fish oil  Lab Results   Component Value Date    HGBA1C 8.1 (A) 01/28/2025

## 2025-04-29 NOTE — ASSESSMENT & PLAN NOTE
BMI Counseling: Body mass index is 39.93 kg/m². The BMI is above normal. Nutrition recommendations include reducing portion sizes, decreasing overall calorie intake, moderation in carbohydrate intake, increasing intake of lean protein, reducing intake of saturated fat and trans fat, and reducing intake of cholesterol. Exercise recommendations include moderate aerobic physical activity for 150 minutes/week.    Lab Results   Component Value Date    HGBA1C 8.1 (A) 01/28/2025

## 2025-04-29 NOTE — PROGRESS NOTES
Adult Annual Physical  Name: Todd Causey      : 1975      MRN: [id number]  Encounter Provider: Heladio Todd PA-C  Encounter Date: 2025   Encounter department: [facility]    :  Assessment & Plan  Type 2 diabetes mellitus without complication, without long-term current use of insulin (HCC)  A1c improving  Continue current regimen  Lab Results   Component Value Date    HGBA1C 8.1 (A) 2025       Orders:  •  POCT hemoglobin A1c  •  Empagliflozin (Jardiance) 10 MG TABS tablet; Take 1 tablet (10 mg total) by mouth daily  •  metFORMIN (GLUCOPHAGE-XR) 500 mg 24 hr tablet; Take 2 tablets (1,000 mg total) by mouth 2 (two) times a day with meals    Mixed hyperlipidemia    Orders:  •  atorvastatin (LIPITOR) 80 mg tablet; Take 1 tablet (80 mg total) by mouth daily    Essential hypertension    Orders:  •  amLODIPine (NORVASC) 5 mg tablet; Take 2 tablets (10 mg total) by mouth daily  •  losartan-hydrochlorothiazide (HYZAAR) 100-25 MG per tablet; Take 1 tablet by mouth daily    Hypertension complicating diabetes (HCC)  At goal  Continue current regimen  Monitor renal function  Lab Results   Component Value Date    HGBA1C 8.1 (A) 2025            Hyperlipidemia associated with type 2 diabetes mellitus  (HCC)  Due for FLP, previously ordered  Continue statin, fibrate, fish oil  Lab Results   Component Value Date    HGBA1C 8.1 (A) 2025            Type 2 diabetes mellitus with obesity  (HCC)  BMI Counseling: Body mass index is 39.93 kg/m². The BMI is above normal. Nutrition recommendations include reducing portion sizes, decreasing overall calorie intake, moderation in carbohydrate intake, increasing intake of lean protein, reducing intake of saturated fat and trans fat, and reducing intake of cholesterol. Exercise recommendations include moderate aerobic physical activity for 150 minutes/week.    Lab Results   Component Value Date    HGBA1C 8.1 (A) 2025            Health  maintenance examination  Hx reviewed and updated, unremarkable exam, screenings/labs/guidance as below           Preventive Screenings:  - Diabetes Screening: screening not indicated and has diabetes  - Cholesterol Screening: screening not indicated and has hyperlipidemia   - Hepatitis C screening: screening not indicated   - HIV screening: screening not indicated   - Colon cancer screening: screening up-to-date   - Lung cancer screening: screening not indicated   - Prostate cancer screening: orders placed     Counseling/Anticipatory Guidance:  - Alcohol: discussed moderation in alcohol intake and recommendations for healthy alcohol use.   - Drug use: discussed harms of illicit drug use and how it can negatively impact mental/physical health.   - Tobacco use: discussed harms of tobacco use and management options for quitting.   - Dental health: discussed importance of regular tooth brushing, flossing, and dental visits.   - Diet: discussed recommendations for a healthy/well-balanced diet.   - Exercise: the importance of regular exercise/physical activity was discussed. Recommend exercise 3-5 times per week for at least 30 minutes.       Depression Screening and Follow-up Plan: Patient was screened for depression during today's encounter. They screened negative with a PHQ-2 score of 0.          History of Present Illness     Adult Annual Physical:  Patient presents for annual physical. Pt presents for follow up, physical. Labs not complete prior to visit     DM: a1c 7.5,, on jardiance, trulicity, metformin. Notes poor diet. +statin, arb  HLD: on high dose statin, fibrate, fish oil, due for FLP  HTN: at goal,  on hyzaar, amlodipine. no end organ complaints  Due for PSA  Utd with CRc screening  Non smoker, no abuse/misuse of alcohol or illicit drugs  Meds/allergies reviewed   .     Diet and Physical Activity:  - Diet/Nutrition: poor diet and frequent junk food.  - Exercise: moderate cardiovascular exercise, 3-4 times a  "week on average and less than 30 minutes on average.    Depression Screening:  - PHQ-2 Score: 0    General Health:  - Sleep: sleeps well, 7-8 hours of sleep on average and uses CPAP machine.  - Hearing: normal hearing bilateral ears.  - Vision: no vision problems, wears glasses and most recent eye exam > 1 year ago. Readers  - Dental: regular dental visits, brushes teeth twice daily and floss regularly.     Health:  - History of STDs: no.   - Urinary symptoms: none.     Review of Systems   Constitutional:  Negative for chills, fatigue and fever.   HENT:  Negative for congestion, ear pain, hearing loss, nosebleeds, postnasal drip, rhinorrhea, sinus pressure, sinus pain, sneezing and sore throat.    Eyes:  Negative for pain, discharge, itching and visual disturbance.   Respiratory:  Negative for cough, chest tightness, shortness of breath and wheezing.    Cardiovascular:  Negative for chest pain, palpitations and leg swelling.   Gastrointestinal:  Negative for abdominal pain, blood in stool, constipation, diarrhea, nausea and vomiting.   Genitourinary:  Negative for frequency and urgency.   Neurological:  Negative for dizziness, light-headedness and numbness.         Objective   /88   Pulse 73   Temp (!) 97.4 °F (36.3 °C)   Ht 6' 2\" (1.88 m)   Wt (!) 141 kg (311 lb)   SpO2 98%   BMI 39.93 kg/m²     Physical Exam  Vitals and nursing note reviewed.   Constitutional:       General: He is not in acute distress.     Appearance: He is well-developed.   HENT:      Head: Normocephalic and atraumatic.   Eyes:      Conjunctiva/sclera: Conjunctivae normal.   Cardiovascular:      Rate and Rhythm: Normal rate and regular rhythm.      Heart sounds: No murmur heard.  Pulmonary:      Effort: Pulmonary effort is normal. No respiratory distress.      Breath sounds: Normal breath sounds.   Abdominal:      Palpations: Abdomen is soft.      Tenderness: There is no abdominal tenderness.   Musculoskeletal:         General: No " swelling.      Cervical back: Neck supple.   Skin:     General: Skin is warm and dry.      Capillary Refill: Capillary refill takes less than 2 seconds.   Neurological:      Mental Status: He is alert.   Psychiatric:         Mood and Affect: Mood normal.

## 2025-04-30 ENCOUNTER — RESULTS FOLLOW-UP (OUTPATIENT)
Dept: FAMILY MEDICINE CLINIC | Facility: CLINIC | Age: 50
End: 2025-04-30

## 2025-06-02 DIAGNOSIS — R52 PAIN: ICD-10-CM

## 2025-06-02 DIAGNOSIS — M10.9 GOUT, UNSPECIFIED CAUSE, UNSPECIFIED CHRONICITY, UNSPECIFIED SITE: ICD-10-CM

## 2025-06-02 RX ORDER — ALLOPURINOL 300 MG/1
300 TABLET ORAL DAILY
Qty: 90 TABLET | Refills: 1 | Status: SHIPPED | OUTPATIENT
Start: 2025-06-02

## 2025-06-05 RX ORDER — IBUPROFEN 600 MG/1
600 TABLET ORAL EVERY 8 HOURS
Qty: 270 TABLET | Refills: 3 | OUTPATIENT
Start: 2025-06-05

## 2025-07-20 DIAGNOSIS — N52.8 OTHER MALE ERECTILE DYSFUNCTION: ICD-10-CM

## 2025-07-20 DIAGNOSIS — I10 ESSENTIAL HYPERTENSION: ICD-10-CM

## 2025-07-21 RX ORDER — AMLODIPINE BESYLATE 5 MG/1
10 TABLET ORAL DAILY
Qty: 90 TABLET | Refills: 0 | OUTPATIENT
Start: 2025-07-21

## 2025-07-21 RX ORDER — SILDENAFIL 100 MG/1
TABLET, FILM COATED ORAL
Qty: 30 TABLET | Refills: 3 | Status: SHIPPED | OUTPATIENT
Start: 2025-07-21

## 2025-07-21 NOTE — TELEPHONE ENCOUNTER
Refill must be reviewed and completed by the office or provider. The refill is unable to be approved or denied by the medication management team.    Patient not seen > 2 year

## 2025-08-18 DIAGNOSIS — E78.2 MIXED HYPERLIPIDEMIA: ICD-10-CM

## 2025-08-18 RX ORDER — ICOSAPENT ETHYL 1 G/1
CAPSULE ORAL 2 TIMES DAILY
Qty: 180 CAPSULE | Refills: 0 | Status: SHIPPED | OUTPATIENT
Start: 2025-08-18

## (undated) DEVICE — GUIDEWIRE STRGHT TIP 0.035 IN  SOLO PLUS

## (undated) DEVICE — 4-PORT MANIFOLD: Brand: NEPTUNE 2

## (undated) DEVICE — BULB SYRINGE, IRRIGATION WITH PROTECTIVE CAP, 60 CC, INDIVIDUALLY WRAPPED: Brand: DOVER

## (undated) DEVICE — 3M™ DURAPORE™ SURGICAL TAPE 2 INCHES X 10YARDS (5.0CM X 9.1M) 6ROLLS/CARTON 10CARTONS/CASE 1538-2: Brand: 3M™ DURAPORE™

## (undated) DEVICE — GLOVE INDICATOR PI UNDERGLOVE SZ 6.5 BLUE

## (undated) DEVICE — ASTOUND FABRIC REINFORCED SURGICAL GOWN: Brand: CONVERTORS

## (undated) DEVICE — GLOVE SRG BIOGEL ECLIPSE 8

## (undated) DEVICE — GLOVE INDICATOR PI UNDERGLOVE SZ 7.5 BLUE

## (undated) DEVICE — GARMENT,MEDLINE,DVT,INT,CALF,FOAM,MED: Brand: MEDLINE

## (undated) DEVICE — STERILE BETHLEHEM PLASTIC HAND: Brand: CARDINAL HEALTH

## (undated) DEVICE — GLOVE SRG BIOGEL 6.5

## (undated) DEVICE — INTENDED FOR TISSUE SEPARATION, AND OTHER PROCEDURES THAT REQUIRE A SHARP SURGICAL BLADE TO PUNCTURE OR CUT.: Brand: BARD-PARKER ® CARBON RIB-BACK BLADES

## (undated) DEVICE — CATH URETERAL 5FR X 70 CM FLEX TIP POLYUR BARD

## (undated) DEVICE — PREP SURGICAL PURPREP 26ML

## (undated) DEVICE — SUT ETHILON 4-0 PS-2 18 IN 1667H

## (undated) DEVICE — SYRINGE 20ML LL

## (undated) DEVICE — SUT VICRYL 3-0 PS-1 18 IN J683G

## (undated) DEVICE — LIGHT HANDLE COVER SLEEVE DISP BLUE STELLAR

## (undated) DEVICE — SPECIMEN CONTAINER STERILE PEEL PACK

## (undated) DEVICE — GLOVE INDICATOR PI UNDERGLOVE SZ 8 BLUE

## (undated) DEVICE — BETHLEHEM UNIVERSAL  MIONR EXT: Brand: CARDINAL HEALTH

## (undated) DEVICE — NEPTUNE E-SEP SMOKE EVACUATION PENCIL, COATED, 70MM BLADE, PUSH BUTTON SWITCH: Brand: NEPTUNE E-SEP

## (undated) DEVICE — READY WET SKIN SCRUB TRAY-LF: Brand: MEDLINE INDUSTRIES, INC.

## (undated) DEVICE — LASER FIBER HOLMIUM 272MICRON

## (undated) DEVICE — ACE WRAP 2 IN STERILE

## (undated) DEVICE — KNIFE RETROGRADE LIGAMENT

## (undated) DEVICE — NON-STERILE REUSABLE TOURNIQUET CUFF SINGLE BLADDER, DUAL PORT AND QUICK CONNECT CONNECTOR: Brand: COLOR CUFF

## (undated) DEVICE — NEEDLE 25G X 1 1/2

## (undated) DEVICE — GLOVE SRG BIOGEL 8

## (undated) DEVICE — GLOVE SRG BIOGEL 7.5

## (undated) DEVICE — CURITY NON-ADHERENT STRIPS: Brand: CURITY

## (undated) DEVICE — CURITY STRETCH BANDAGE: Brand: CURITY

## (undated) DEVICE — ENDOSCOPIC VALVE WITH ADAPTER.: Brand: SURSEAL® II

## (undated) DEVICE — STOCKINETTE,IMPERVIOUS,12X48,STERILE: Brand: MEDLINE

## (undated) DEVICE — DECANTER: Brand: UNBRANDED

## (undated) DEVICE — DRAPE SHEET THREE QUARTER

## (undated) DEVICE — STRETCH BANDAGE: Brand: CURITY

## (undated) DEVICE — GAUZE SPONGES,16 PLY: Brand: CURITY

## (undated) DEVICE — OCCLUSIVE GAUZE STRIP,3% BISMUTH TRIBROMOPHENATE IN PETROLATUM BLEND: Brand: XEROFORM

## (undated) DEVICE — TUBING SUCTION 5MM X 12 FT

## (undated) DEVICE — Device

## (undated) DEVICE — 3M™ COBAN™ NL STERILE NON-LATEX SELF-ADHERENT WRAP, 2084S, 4 IN X 5 YD (10 CM X 4,5 M), 18 ROLLS/CASE: Brand: 3M™ COBAN™

## (undated) DEVICE — COBAN 2 IN UNSTERILE

## (undated) DEVICE — ZIMMER® STERILE DISPOSABLE TOURNIQUET CUFF, DUAL PORT, SINGLE BLADDER, 18 IN. (46 CM)